# Patient Record
Sex: MALE | Race: WHITE | NOT HISPANIC OR LATINO | URBAN - METROPOLITAN AREA
[De-identification: names, ages, dates, MRNs, and addresses within clinical notes are randomized per-mention and may not be internally consistent; named-entity substitution may affect disease eponyms.]

---

## 2017-05-19 ENCOUNTER — INPATIENT (INPATIENT)
Facility: HOSPITAL | Age: 47
LOS: 27 days | Discharge: HOME HEALTH | End: 2017-06-16
Attending: COLON & RECTAL SURGERY | Admitting: COLON & RECTAL SURGERY
Payer: COMMERCIAL

## 2017-05-19 VITALS — WEIGHT: 212.08 LBS

## 2017-05-19 DIAGNOSIS — K56.69 OTHER INTESTINAL OBSTRUCTION: ICD-10-CM

## 2017-05-19 DIAGNOSIS — K57.80 DIVERTICULITIS OF INTESTINE, PART UNSPECIFIED, WITH PERFORATION AND ABSCESS WITHOUT BLEEDING: ICD-10-CM

## 2017-05-19 LAB
ALBUMIN SERPL ELPH-MCNC: 3.5 G/DL — SIGNIFICANT CHANGE UP (ref 3.3–5)
ALP SERPL-CCNC: 67 U/L — SIGNIFICANT CHANGE UP (ref 40–120)
ALT FLD-CCNC: 38 U/L — SIGNIFICANT CHANGE UP (ref 12–78)
ANION GAP SERPL CALC-SCNC: 11 MMOL/L — SIGNIFICANT CHANGE UP (ref 5–17)
APPEARANCE UR: CLEAR — SIGNIFICANT CHANGE UP
APTT BLD: 27.7 SEC — SIGNIFICANT CHANGE UP (ref 27.5–37.4)
AST SERPL-CCNC: 19 U/L — SIGNIFICANT CHANGE UP (ref 15–37)
BACTERIA # UR AUTO: (no result)
BASOPHILS # BLD AUTO: 0.1 K/UL — SIGNIFICANT CHANGE UP (ref 0–0.2)
BILIRUB SERPL-MCNC: 1.5 MG/DL — HIGH (ref 0.2–1.2)
BILIRUB UR-MCNC: NEGATIVE — SIGNIFICANT CHANGE UP
BUN SERPL-MCNC: 14 MG/DL — SIGNIFICANT CHANGE UP (ref 7–23)
CALCIUM SERPL-MCNC: 8.7 MG/DL — SIGNIFICANT CHANGE UP (ref 8.5–10.1)
CHLORIDE SERPL-SCNC: 102 MMOL/L — SIGNIFICANT CHANGE UP (ref 96–108)
CO2 SERPL-SCNC: 25 MMOL/L — SIGNIFICANT CHANGE UP (ref 22–31)
COLOR SPEC: YELLOW — SIGNIFICANT CHANGE UP
CREAT SERPL-MCNC: 1.28 MG/DL — SIGNIFICANT CHANGE UP (ref 0.5–1.3)
DIFF PNL FLD: (no result)
EOSINOPHIL # BLD AUTO: 0 K/UL — SIGNIFICANT CHANGE UP (ref 0–0.5)
EPI CELLS # UR: SIGNIFICANT CHANGE UP
GLUCOSE SERPL-MCNC: 90 MG/DL — SIGNIFICANT CHANGE UP (ref 70–99)
GLUCOSE UR QL: NEGATIVE MG/DL — SIGNIFICANT CHANGE UP
HCT VFR BLD CALC: 48 % — SIGNIFICANT CHANGE UP (ref 39–50)
HGB BLD-MCNC: 16.6 G/DL — SIGNIFICANT CHANGE UP (ref 13–17)
INR BLD: 1.06 RATIO — SIGNIFICANT CHANGE UP (ref 0.88–1.16)
KETONES UR-MCNC: (no result)
LEUKOCYTE ESTERASE UR-ACNC: (no result)
LIDOCAIN IGE QN: 102 U/L — SIGNIFICANT CHANGE UP (ref 73–393)
LYMPHOCYTES # BLD AUTO: 1.9 K/UL — SIGNIFICANT CHANGE UP (ref 1–3.3)
LYMPHOCYTES # BLD AUTO: 13 % — SIGNIFICANT CHANGE UP (ref 13–44)
MANUAL DIF COMMENT BLD-IMP: SIGNIFICANT CHANGE UP
MCHC RBC-ENTMCNC: 29.8 PG — SIGNIFICANT CHANGE UP (ref 27–34)
MCHC RBC-ENTMCNC: 34.5 GM/DL — SIGNIFICANT CHANGE UP (ref 32–36)
MCV RBC AUTO: 86.2 FL — SIGNIFICANT CHANGE UP (ref 80–100)
MONOCYTES # BLD AUTO: 1.7 K/UL — HIGH (ref 0–0.9)
MONOCYTES NFR BLD AUTO: 7 % — SIGNIFICANT CHANGE UP (ref 2–14)
NEUTROPHILS # BLD AUTO: 14.6 K/UL — HIGH (ref 1.8–7.4)
NEUTROPHILS NFR BLD AUTO: 79 % — HIGH (ref 43–77)
NEUTS BAND # BLD: 1 % — SIGNIFICANT CHANGE UP (ref 0–8)
NITRITE UR-MCNC: NEGATIVE — SIGNIFICANT CHANGE UP
PH UR: 6.5 — SIGNIFICANT CHANGE UP (ref 5–8)
PLAT MORPH BLD: NORMAL — SIGNIFICANT CHANGE UP
PLATELET # BLD AUTO: 192 K/UL — SIGNIFICANT CHANGE UP (ref 150–400)
POTASSIUM SERPL-MCNC: 4 MMOL/L — SIGNIFICANT CHANGE UP (ref 3.5–5.3)
POTASSIUM SERPL-SCNC: 4 MMOL/L — SIGNIFICANT CHANGE UP (ref 3.5–5.3)
PROT SERPL-MCNC: 7.2 GM/DL — SIGNIFICANT CHANGE UP (ref 6–8.3)
PROT UR-MCNC: 30 MG/DL
PROTHROM AB SERPL-ACNC: 11.5 SEC — SIGNIFICANT CHANGE UP (ref 9.8–12.7)
RBC # BLD: 5.56 M/UL — SIGNIFICANT CHANGE UP (ref 4.2–5.8)
RBC # FLD: 11.3 % — SIGNIFICANT CHANGE UP (ref 10.3–14.5)
RBC BLD AUTO: NORMAL — SIGNIFICANT CHANGE UP
RBC CASTS # UR COMP ASSIST: (no result) /HPF (ref 0–4)
SODIUM SERPL-SCNC: 138 MMOL/L — SIGNIFICANT CHANGE UP (ref 135–145)
SP GR SPEC: 1.01 — SIGNIFICANT CHANGE UP (ref 1.01–1.02)
UROBILINOGEN FLD QL: 1 MG/DL
WBC # BLD: 18.3 K/UL — HIGH (ref 3.8–10.5)
WBC # FLD AUTO: 18.3 K/UL — HIGH (ref 3.8–10.5)
WBC UR QL: (no result)

## 2017-05-19 PROCEDURE — 93010 ELECTROCARDIOGRAM REPORT: CPT

## 2017-05-19 PROCEDURE — 99222 1ST HOSP IP/OBS MODERATE 55: CPT

## 2017-05-19 PROCEDURE — 99285 EMERGENCY DEPT VISIT HI MDM: CPT

## 2017-05-19 PROCEDURE — 74177 CT ABD & PELVIS W/CONTRAST: CPT | Mod: 26

## 2017-05-19 RX ORDER — PIPERACILLIN AND TAZOBACTAM 4; .5 G/20ML; G/20ML
3.38 INJECTION, POWDER, LYOPHILIZED, FOR SOLUTION INTRAVENOUS EVERY 8 HOURS
Qty: 0 | Refills: 0 | Status: DISCONTINUED | OUTPATIENT
Start: 2017-05-19 | End: 2017-05-21

## 2017-05-19 RX ORDER — SODIUM CHLORIDE 9 MG/ML
500 INJECTION INTRAMUSCULAR; INTRAVENOUS; SUBCUTANEOUS ONCE
Qty: 0 | Refills: 0 | Status: COMPLETED | OUTPATIENT
Start: 2017-05-19 | End: 2017-05-20

## 2017-05-19 RX ORDER — CIPROFLOXACIN LACTATE 400MG/40ML
400 VIAL (ML) INTRAVENOUS ONCE
Qty: 0 | Refills: 0 | Status: COMPLETED | OUTPATIENT
Start: 2017-05-19 | End: 2017-05-19

## 2017-05-19 RX ORDER — SODIUM CHLORIDE 9 MG/ML
1000 INJECTION INTRAMUSCULAR; INTRAVENOUS; SUBCUTANEOUS
Qty: 0 | Refills: 0 | Status: DISCONTINUED | OUTPATIENT
Start: 2017-05-19 | End: 2017-05-20

## 2017-05-19 RX ORDER — MORPHINE SULFATE 50 MG/1
4 CAPSULE, EXTENDED RELEASE ORAL ONCE
Qty: 0 | Refills: 0 | Status: DISCONTINUED | OUTPATIENT
Start: 2017-05-19 | End: 2017-05-19

## 2017-05-19 RX ORDER — ONDANSETRON 8 MG/1
4 TABLET, FILM COATED ORAL EVERY 6 HOURS
Qty: 0 | Refills: 0 | Status: DISCONTINUED | OUTPATIENT
Start: 2017-05-19 | End: 2017-05-21

## 2017-05-19 RX ORDER — MORPHINE SULFATE 50 MG/1
2 CAPSULE, EXTENDED RELEASE ORAL
Qty: 0 | Refills: 0 | Status: DISCONTINUED | OUTPATIENT
Start: 2017-05-19 | End: 2017-05-21

## 2017-05-19 RX ORDER — SODIUM CHLORIDE 9 MG/ML
2000 INJECTION INTRAMUSCULAR; INTRAVENOUS; SUBCUTANEOUS ONCE
Qty: 0 | Refills: 0 | Status: COMPLETED | OUTPATIENT
Start: 2017-05-19 | End: 2017-05-19

## 2017-05-19 RX ORDER — ONDANSETRON 8 MG/1
4 TABLET, FILM COATED ORAL ONCE
Qty: 0 | Refills: 0 | Status: COMPLETED | OUTPATIENT
Start: 2017-05-19 | End: 2017-05-19

## 2017-05-19 RX ORDER — HYDROMORPHONE HYDROCHLORIDE 2 MG/ML
1 INJECTION INTRAMUSCULAR; INTRAVENOUS; SUBCUTANEOUS ONCE
Qty: 0 | Refills: 0 | Status: DISCONTINUED | OUTPATIENT
Start: 2017-05-19 | End: 2017-05-19

## 2017-05-19 RX ORDER — METRONIDAZOLE 500 MG
500 TABLET ORAL ONCE
Qty: 0 | Refills: 0 | Status: COMPLETED | OUTPATIENT
Start: 2017-05-19 | End: 2017-05-19

## 2017-05-19 RX ORDER — ACETAMINOPHEN 500 MG
650 TABLET ORAL EVERY 6 HOURS
Qty: 0 | Refills: 0 | Status: DISCONTINUED | OUTPATIENT
Start: 2017-05-19 | End: 2017-05-21

## 2017-05-19 RX ORDER — ENOXAPARIN SODIUM 100 MG/ML
40 INJECTION SUBCUTANEOUS DAILY
Qty: 0 | Refills: 0 | Status: DISCONTINUED | OUTPATIENT
Start: 2017-05-19 | End: 2017-05-21

## 2017-05-19 RX ADMIN — MORPHINE SULFATE 4 MILLIGRAM(S): 50 CAPSULE, EXTENDED RELEASE ORAL at 16:34

## 2017-05-19 RX ADMIN — PIPERACILLIN AND TAZOBACTAM 25 GRAM(S): 4; .5 INJECTION, POWDER, LYOPHILIZED, FOR SOLUTION INTRAVENOUS at 22:17

## 2017-05-19 RX ADMIN — Medication 100 MILLIGRAM(S): at 17:41

## 2017-05-19 RX ADMIN — SODIUM CHLORIDE 2000 MILLILITER(S): 9 INJECTION INTRAMUSCULAR; INTRAVENOUS; SUBCUTANEOUS at 14:15

## 2017-05-19 RX ADMIN — Medication 100 MILLIGRAM(S): at 18:13

## 2017-05-19 RX ADMIN — ONDANSETRON 4 MILLIGRAM(S): 8 TABLET, FILM COATED ORAL at 14:18

## 2017-05-19 RX ADMIN — HYDROMORPHONE HYDROCHLORIDE 1 MILLIGRAM(S): 2 INJECTION INTRAMUSCULAR; INTRAVENOUS; SUBCUTANEOUS at 14:18

## 2017-05-19 RX ADMIN — ENOXAPARIN SODIUM 40 MILLIGRAM(S): 100 INJECTION SUBCUTANEOUS at 22:19

## 2017-05-19 RX ADMIN — MORPHINE SULFATE 4 MILLIGRAM(S): 50 CAPSULE, EXTENDED RELEASE ORAL at 17:04

## 2017-05-19 RX ADMIN — SODIUM CHLORIDE 125 MILLILITER(S): 9 INJECTION INTRAMUSCULAR; INTRAVENOUS; SUBCUTANEOUS at 20:42

## 2017-05-19 NOTE — ED STATDOCS - OBJECTIVE STATEMENT
48 y/o male with PMHx of fatty liver presents to the ED c/o abd pain with an onset 1 day ago. Radiates to back. Pt has difficulty ambulating. +N/V/D, decreased PO intake  Denies fever. Last BM 2 days ago. Recent travel to [place]. Current smoker. Drinks socially. No drug use.

## 2017-05-19 NOTE — ED STATDOCS - CONDUCTED A DETAILED DISCUSSION WITH PATIENT AND/OR GUARDIAN REGARDING, MDM
radiology results/lab results/need to admit/return to ED if symptoms worsen, persist or questions arise

## 2017-05-19 NOTE — H&P ADULT - NSHPLABSRESULTS_GEN_ALL_CORE
16.6   18.3  )-----------( 192      ( 19 May 2017 14:09 )             48.0     05-19    138  |  102  |  14  ----------------------------<  90  4.0   |  25  |  1.28    Ca    8.7      19 May 2017 14:09    TPro  7.2  /  Alb  3.5  /  TBili  1.5<H>  /  DBili  x   /  AST  19  /  ALT  38  /  AlkPhos  67  05-19          EXAM:  CT ABDOMEN AND PELVIS IC                            PROCEDURE DATE:  05/19/2017        INTERPRETATION:  CLINICAL INFORMATION: 47-year-old man with abdominal   pain assess for appendicitis     TECHNIQUE:    CT of abdomen and pelvis was performed with axial images   were obtained from the diaphragm to pubic symphysis oral and IV contrast.    COMPARISON:  None.    FINDINGS:    Liver: Borderline hepatomegaly with mild fatty infiltration otherwise   within normal limits  Gallbladder: Within normal limits  Bile ducts: No intrahepatic or extrahepatic biliary dilatation  Pancreas: within normal limits    Spleen: within normal limits  Adrenal: within normal limits  Kidneys, Ureters and Bladder: Symmetric enhancement bilaterally. No   perinephric attending or collections. No hydronephrosis. No intrarenal   calculi. Normal caliber of the ureters. Limited unopacified nondistended   bladder.    Pelvis: No pelvic adenopathy or pelvic free fluid.  Small fat-containing   bilateral inguinal hernias.      Bowel: No bowel obstruction.  There are few scattered colonic   diverticula. There is focal thickening of sigmoid colon in the pelvis   associated with mesenteric fat stranding and thickening of adjacent   fascial planes with localized perforation and small air bubbles without   abscess. Findings are consistent with acute rupture diverticulitis. Small   free fluid adjacent to gas filled appendix.    Peritoneum: Small ascites, no organized fluid collections.    Retroperitoneum: within normal limits  Vessels: Patent.    Abdominal wall: Small fat-containing umbilical hernia.    Lower chest and lung Bases: The visualized lung bases clear except for   subsegmental dependent atelectasis. Heart normal in size.   Bones: Degenerative changes.     IMPRESSION:     Focal thickening of sigmoid colon with mesenteric fat stranding and   thickening of fascial planes with scattered adjacent air bubbles   consistent with acute diverticulitis without abscess. Small free fluid in   the abdomen extending to the right quadrant.              JAE MILLER   This document has been electronically signed. May 19 2017  4:48PM

## 2017-05-19 NOTE — ED STATDOCS - PROGRESS NOTE DETAILS
KAROLINE Galarza:  Pt still c/o RLQ, CT pending will order morphine and follow. KAROLINE Galarza:  Dr. hector gutiérrez for acute perforated diverticulitis. awaiting callback. KAROLINE Galarza:  s/w Dr. Boss will come and evaluate the pt.

## 2017-05-19 NOTE — ED ADULT NURSE NOTE - OBJECTIVE STATEMENT
Pt reports recent travel to [place] (home yesterday), s/s starting x2 days ago, TTP to periumbilical area with radiation to back, denies  symptoms, reports abd distension, C/D and decreased appetite.

## 2017-05-19 NOTE — ED STATDOCS - CARE PLAN
Principal Discharge DX:	Diverticulitis of intestine with perforation without bleeding, unspecified part of intestinal tract

## 2017-05-19 NOTE — ED STATDOCS - MEDICAL DECISION MAKING DETAILS
46 y/o male c/o abd pain, nausea and diarrhea and had recent travel to [facility] [place]. Will obtain labs, CT and meds.

## 2017-05-19 NOTE — ED STATDOCS - NS ED MD SCRIBE ATTENDING SCRIBE SECTIONS
REVIEW OF SYSTEMS/VITAL SIGNS( Pullset)/PHYSICAL EXAM/PAST MEDICAL/SURGICAL/SOCIAL HISTORY/RESULTS/HISTORY OF PRESENT ILLNESS/PROGRESS NOTE/DISPOSITION

## 2017-05-19 NOTE — ED STATDOCS - DETAILS:
I, Gustabo Jamil, performed the initial face to face bedside interview with this patient regarding history of present illness, review of symptoms and relevant past medical, social and family history.  I completed an independent physical examination.  I was the initial provider who evaluated this patient. I have signed out the follow up of any pending tests (i.e. labs, radiological studies) to the ACP.  I have communicated the patient’s plan of care and disposition with the ACP.  The history, relevant review of systems, past medical and surgical history, medical decision making, and physical examination was documented by the scribe in my presence and I attest to the accuracy of the documentation.

## 2017-05-19 NOTE — H&P ADULT - HISTORY OF PRESENT ILLNESS
47M admitted for a 24hr h/o progressive abdominal pain that began after a 1-2 day trip to [facility] ([place] The pain is localized to the suprapubic location, associated with bladder pressure and decreased po intake. No nausea, fevers/chills. Cont passing flatus and reports stools are somewhat diarrheal. WBC 18, CT with acute sigmoid diverticulitis, no drainable abscess or fluid collection, though small amounts of free fluid adjacent to appendix.

## 2017-05-19 NOTE — H&P ADULT - PROBLEM SELECTOR PLAN 1
Admit, NPO, IVF, broad spectrum abx, serial abdominal exams  Counselled patient that any worsening in vitals, WBC, abdominal exam may necessitate operative intervention

## 2017-05-20 LAB
ANION GAP SERPL CALC-SCNC: 11 MMOL/L — SIGNIFICANT CHANGE UP (ref 5–17)
BUN SERPL-MCNC: 13 MG/DL — SIGNIFICANT CHANGE UP (ref 7–23)
CALCIUM SERPL-MCNC: 7.6 MG/DL — LOW (ref 8.5–10.1)
CHLORIDE SERPL-SCNC: 108 MMOL/L — SIGNIFICANT CHANGE UP (ref 96–108)
CO2 SERPL-SCNC: 23 MMOL/L — SIGNIFICANT CHANGE UP (ref 22–31)
CREAT SERPL-MCNC: 1.09 MG/DL — SIGNIFICANT CHANGE UP (ref 0.5–1.3)
CULTURE RESULTS: NO GROWTH — SIGNIFICANT CHANGE UP
GLUCOSE SERPL-MCNC: 92 MG/DL — SIGNIFICANT CHANGE UP (ref 70–99)
HCT VFR BLD CALC: 42.1 % — SIGNIFICANT CHANGE UP (ref 39–50)
HCT VFR BLD CALC: 43 % — SIGNIFICANT CHANGE UP (ref 39–50)
HGB BLD-MCNC: 14 G/DL — SIGNIFICANT CHANGE UP (ref 13–17)
HGB BLD-MCNC: 14.3 G/DL — SIGNIFICANT CHANGE UP (ref 13–17)
MCHC RBC-ENTMCNC: 29.9 PG — SIGNIFICANT CHANGE UP (ref 27–34)
MCHC RBC-ENTMCNC: 30 PG — SIGNIFICANT CHANGE UP (ref 27–34)
MCHC RBC-ENTMCNC: 33.2 GM/DL — SIGNIFICANT CHANGE UP (ref 32–36)
MCHC RBC-ENTMCNC: 33.2 GM/DL — SIGNIFICANT CHANGE UP (ref 32–36)
MCV RBC AUTO: 90 FL — SIGNIFICANT CHANGE UP (ref 80–100)
MCV RBC AUTO: 90.5 FL — SIGNIFICANT CHANGE UP (ref 80–100)
PLATELET # BLD AUTO: 138 K/UL — LOW (ref 150–400)
PLATELET # BLD AUTO: 154 K/UL — SIGNIFICANT CHANGE UP (ref 150–400)
POTASSIUM SERPL-MCNC: 3.9 MMOL/L — SIGNIFICANT CHANGE UP (ref 3.5–5.3)
POTASSIUM SERPL-SCNC: 3.9 MMOL/L — SIGNIFICANT CHANGE UP (ref 3.5–5.3)
RBC # BLD: 4.67 M/UL — SIGNIFICANT CHANGE UP (ref 4.2–5.8)
RBC # BLD: 4.75 M/UL — SIGNIFICANT CHANGE UP (ref 4.2–5.8)
RBC # FLD: 11.9 % — SIGNIFICANT CHANGE UP (ref 10.3–14.5)
RBC # FLD: 11.9 % — SIGNIFICANT CHANGE UP (ref 10.3–14.5)
SODIUM SERPL-SCNC: 142 MMOL/L — SIGNIFICANT CHANGE UP (ref 135–145)
SPECIMEN SOURCE: SIGNIFICANT CHANGE UP
WBC # BLD: 19.2 K/UL — HIGH (ref 3.8–10.5)
WBC # BLD: 19.5 K/UL — HIGH (ref 3.8–10.5)
WBC # FLD AUTO: 19.2 K/UL — HIGH (ref 3.8–10.5)
WBC # FLD AUTO: 19.5 K/UL — HIGH (ref 3.8–10.5)

## 2017-05-20 PROCEDURE — 99233 SBSQ HOSP IP/OBS HIGH 50: CPT | Mod: 57

## 2017-05-20 RX ORDER — SODIUM CHLORIDE 9 MG/ML
1000 INJECTION INTRAMUSCULAR; INTRAVENOUS; SUBCUTANEOUS
Qty: 0 | Refills: 0 | Status: DISCONTINUED | OUTPATIENT
Start: 2017-05-20 | End: 2017-05-21

## 2017-05-20 RX ORDER — METRONIDAZOLE 500 MG
TABLET ORAL
Qty: 0 | Refills: 0 | Status: DISCONTINUED | OUTPATIENT
Start: 2017-05-20 | End: 2017-05-21

## 2017-05-20 RX ORDER — METRONIDAZOLE 500 MG
500 TABLET ORAL ONCE
Qty: 0 | Refills: 0 | Status: COMPLETED | OUTPATIENT
Start: 2017-05-20 | End: 2017-05-20

## 2017-05-20 RX ORDER — METRONIDAZOLE 500 MG
1 TABLET ORAL
Qty: 18 | Refills: 0
Start: 2017-05-20 | End: 2017-05-26

## 2017-05-20 RX ORDER — SODIUM CHLORIDE 9 MG/ML
500 INJECTION INTRAMUSCULAR; INTRAVENOUS; SUBCUTANEOUS ONCE
Qty: 0 | Refills: 0 | Status: COMPLETED | OUTPATIENT
Start: 2017-05-20 | End: 2017-05-20

## 2017-05-20 RX ORDER — CIPROFLOXACIN LACTATE 400MG/40ML
1 VIAL (ML) INTRAVENOUS
Qty: 12 | Refills: 0
Start: 2017-05-20 | End: 2017-05-26

## 2017-05-20 RX ORDER — IBUPROFEN 200 MG
400 TABLET ORAL ONCE
Qty: 0 | Refills: 0 | Status: COMPLETED | OUTPATIENT
Start: 2017-05-20 | End: 2017-05-20

## 2017-05-20 RX ORDER — METRONIDAZOLE 500 MG
500 TABLET ORAL EVERY 8 HOURS
Qty: 0 | Refills: 0 | Status: DISCONTINUED | OUTPATIENT
Start: 2017-05-20 | End: 2017-05-21

## 2017-05-20 RX ADMIN — HYDROMORPHONE HYDROCHLORIDE 1 MILLIGRAM(S): 2 INJECTION INTRAMUSCULAR; INTRAVENOUS; SUBCUTANEOUS at 00:25

## 2017-05-20 RX ADMIN — MORPHINE SULFATE 2 MILLIGRAM(S): 50 CAPSULE, EXTENDED RELEASE ORAL at 17:53

## 2017-05-20 RX ADMIN — MORPHINE SULFATE 2 MILLIGRAM(S): 50 CAPSULE, EXTENDED RELEASE ORAL at 21:14

## 2017-05-20 RX ADMIN — SODIUM CHLORIDE 1000 MILLILITER(S): 9 INJECTION INTRAMUSCULAR; INTRAVENOUS; SUBCUTANEOUS at 03:21

## 2017-05-20 RX ADMIN — MORPHINE SULFATE 2 MILLIGRAM(S): 50 CAPSULE, EXTENDED RELEASE ORAL at 17:30

## 2017-05-20 RX ADMIN — Medication 650 MILLIGRAM(S): at 12:52

## 2017-05-20 RX ADMIN — ONDANSETRON 4 MILLIGRAM(S): 8 TABLET, FILM COATED ORAL at 17:53

## 2017-05-20 RX ADMIN — PIPERACILLIN AND TAZOBACTAM 25 GRAM(S): 4; .5 INJECTION, POWDER, LYOPHILIZED, FOR SOLUTION INTRAVENOUS at 14:16

## 2017-05-20 RX ADMIN — Medication 400 MILLIGRAM(S): at 03:21

## 2017-05-20 RX ADMIN — SODIUM CHLORIDE 125 MILLILITER(S): 9 INJECTION INTRAMUSCULAR; INTRAVENOUS; SUBCUTANEOUS at 03:21

## 2017-05-20 RX ADMIN — SODIUM CHLORIDE 1000 MILLILITER(S): 9 INJECTION INTRAMUSCULAR; INTRAVENOUS; SUBCUTANEOUS at 00:25

## 2017-05-20 RX ADMIN — Medication 100 MILLIGRAM(S): at 12:57

## 2017-05-20 RX ADMIN — Medication 100 MILLIGRAM(S): at 21:11

## 2017-05-20 RX ADMIN — SODIUM CHLORIDE 1000 MILLILITER(S): 9 INJECTION INTRAMUSCULAR; INTRAVENOUS; SUBCUTANEOUS at 02:18

## 2017-05-20 RX ADMIN — PIPERACILLIN AND TAZOBACTAM 25 GRAM(S): 4; .5 INJECTION, POWDER, LYOPHILIZED, FOR SOLUTION INTRAVENOUS at 22:39

## 2017-05-20 RX ADMIN — Medication 400 MILLIGRAM(S): at 03:22

## 2017-05-20 RX ADMIN — MORPHINE SULFATE 2 MILLIGRAM(S): 50 CAPSULE, EXTENDED RELEASE ORAL at 22:39

## 2017-05-20 RX ADMIN — PIPERACILLIN AND TAZOBACTAM 25 GRAM(S): 4; .5 INJECTION, POWDER, LYOPHILIZED, FOR SOLUTION INTRAVENOUS at 05:08

## 2017-05-20 RX ADMIN — SODIUM CHLORIDE 500 MILLILITER(S): 9 INJECTION INTRAMUSCULAR; INTRAVENOUS; SUBCUTANEOUS at 06:08

## 2017-05-20 NOTE — PROGRESS NOTE ADULT - SUBJECTIVE AND OBJECTIVE BOX
No c/o. Jil low fiber diet without N/V. Passing flatus and stools. No issues with pain.    MEDICATIONS  (STANDING):  piperacillin/tazobactam IVPB. 3.375Gram(s) IV Intermittent every 8 hours  sodium chloride 0.9%. 1000milliLiter(s) IV Continuous <Continuous>  enoxaparin Injectable 40milliGRAM(s) SubCutaneous daily    MEDICATIONS  (PRN):  acetaminophen   Tablet 650milliGRAM(s) Oral every 6 hours PRN For Temp greater than 38.5 C (101.3 F)  morphine  - Injectable 2milliGRAM(s) IV Push every 2 hours PRN Moderate Pain (4 - 6)  morphine  - Injectable 2milliGRAM(s) IV Push every 1 hour PRN Severe Pain (7 - 10)  ondansetron Injectable 4milliGRAM(s) IV Push every 6 hours PRN Nausea and/or Vomiting    Vital Signs Last 24 Hrs  T(C): 36.6, Max: 38.6 (05-20 @ 03:09)  T(F): 97.9, Max: 101.5 (05-20 @ 03:09)  HR: 69 (69 - 92)  BP: 91/55 (82/40 - 129/65)  BP(mean): --  RR: 20 (16 - 20)  SpO2: 96% (95% - 100%)  I&O's Detail    I & Os for current day (as of 20 May 2017 09:36)  =============================================  IN:    IV PiggyBack: 2600 ml    sodium chloride 0.9%.: 500 ml    Total IN: 3100 ml  ---------------------------------------------  OUT:    Voided: 1200 ml    Total OUT: 1200 ml  ---------------------------------------------  Total NET: 1900 ml    NAD  incision CDI, soft, NT, mild distention                        14.3   19.2  )-----------( 138      ( 20 May 2017 08:12 )             43.0     05-20    142  |  108  |  13  ----------------------------<  92  3.9   |  23  |  1.09    Ca    7.6<L>      20 May 2017 08:12    TPro  7.2  /  Alb  3.5  /  TBili  1.5<H>  /  DBili  x   /  AST  19  /  ALT  38  /  AlkPhos  67  05-19    POD 4 LAR for colon perf s/p colonoscopy    Provena discontinued. LUIS M discontinued.  OK to d/c home with additional 6 days of cipro/flagyl Febrile overnight to 101.5. Required multiple fluid boluses for SBPs in the mid 80s without associated tachychardia. Pt reports pain and pressure slightly improved. Has begun passing flatus though not stools.     MEDICATIONS  (STANDING):  piperacillin/tazobactam IVPB. 3.375Gram(s) IV Intermittent every 8 hours  sodium chloride 0.9%. 1000milliLiter(s) IV Continuous <Continuous>  enoxaparin Injectable 40milliGRAM(s) SubCutaneous daily    MEDICATIONS  (PRN):  acetaminophen   Tablet 650milliGRAM(s) Oral every 6 hours PRN For Temp greater than 38.5 C (101.3 F)  morphine  - Injectable 2milliGRAM(s) IV Push every 2 hours PRN Moderate Pain (4 - 6)  morphine  - Injectable 2milliGRAM(s) IV Push every 1 hour PRN Severe Pain (7 - 10)  ondansetron Injectable 4milliGRAM(s) IV Push every 6 hours PRN Nausea and/or Vomiting    Vital Signs Last 24 Hrs  T(C): 36.6, Max: 38.6 (05-20 @ 03:09)  T(F): 97.9, Max: 101.5 (05-20 @ 03:09)  HR: 69 (69 - 92)  BP: 91/55 (82/40 - 129/65)  BP(mean): --  RR: 20 (16 - 20)  SpO2: 96% (95% - 100%)  I&O's Detail    I & Os for current day (as of 20 May 2017 09:36)  =============================================  IN:    IV PiggyBack: 2600 ml    sodium chloride 0.9%.: 500 ml    Total IN: 3100 ml  ---------------------------------------------  OUT:    Voided: 1200 ml    Total OUT: 1200 ml  ---------------------------------------------    Total NET: 1900 ml    NAD  soft, localized tenderness predominantly in the suprapubic location, mild distention                        14.3   19.2  )-----------( 138      ( 20 May 2017 08:12 )             43.0     05-20    142  |  108  |  13  ----------------------------<  92  3.9   |  23  |  1.09    Ca    7.6<L>      20 May 2017 08:12    TPro  7.2  /  Alb  3.5  /  TBili  1.5<H>  /  DBili  x   /  AST  19  /  ALT  38  /  AlkPhos  67  05-19    A/P - Acute sigmoid diverticulitis  Cont morphine prn for pain.  Fluid boluses have been administered to maintain blood pressure.  Cont NPO.   WBC up slightly to 19. On Zosyn. Will add flagyl.  Increase IVF to 150cc.  Advised patient that despite the febrile episode and slight rise in WBC, we will administer at least 24 hrs of abx and monitor the response before the need for surgical intervention becomes more apparent, nikhil as pt feels slightly improved from admission.  He understands and is agreeable.

## 2017-05-21 ENCOUNTER — RESULT REVIEW (OUTPATIENT)
Age: 47
End: 2017-05-21

## 2017-05-21 LAB
ANION GAP SERPL CALC-SCNC: 11 MMOL/L — SIGNIFICANT CHANGE UP (ref 5–17)
ANION GAP SERPL CALC-SCNC: 16 MMOL/L — SIGNIFICANT CHANGE UP (ref 5–17)
BUN SERPL-MCNC: 25 MG/DL — HIGH (ref 7–23)
BUN SERPL-MCNC: 26 MG/DL — HIGH (ref 7–23)
CALCIUM SERPL-MCNC: 7.8 MG/DL — LOW (ref 8.5–10.1)
CALCIUM SERPL-MCNC: 8 MG/DL — LOW (ref 8.5–10.1)
CHLORIDE SERPL-SCNC: 106 MMOL/L — SIGNIFICANT CHANGE UP (ref 96–108)
CHLORIDE SERPL-SCNC: 107 MMOL/L — SIGNIFICANT CHANGE UP (ref 96–108)
CO2 SERPL-SCNC: 17 MMOL/L — LOW (ref 22–31)
CO2 SERPL-SCNC: 20 MMOL/L — LOW (ref 22–31)
CREAT SERPL-MCNC: 1.69 MG/DL — HIGH (ref 0.5–1.3)
CREAT SERPL-MCNC: 1.76 MG/DL — HIGH (ref 0.5–1.3)
GLUCOSE SERPL-MCNC: 196 MG/DL — HIGH (ref 70–99)
GLUCOSE SERPL-MCNC: 210 MG/DL — HIGH (ref 70–99)
HCT VFR BLD CALC: 57.1 % — CRITICAL HIGH (ref 39–50)
HCT VFR BLD CALC: 58 % — CRITICAL HIGH (ref 39–50)
HGB BLD-MCNC: 18.9 G/DL — HIGH (ref 13–17)
HGB BLD-MCNC: 19.6 G/DL — CRITICAL HIGH (ref 13–17)
MCHC RBC-ENTMCNC: 29.5 PG — SIGNIFICANT CHANGE UP (ref 27–34)
MCHC RBC-ENTMCNC: 30 PG — SIGNIFICANT CHANGE UP (ref 27–34)
MCHC RBC-ENTMCNC: 33 GM/DL — SIGNIFICANT CHANGE UP (ref 32–36)
MCHC RBC-ENTMCNC: 33.8 GM/DL — SIGNIFICANT CHANGE UP (ref 32–36)
MCV RBC AUTO: 88.8 FL — SIGNIFICANT CHANGE UP (ref 80–100)
MCV RBC AUTO: 89.3 FL — SIGNIFICANT CHANGE UP (ref 80–100)
PLATELET # BLD AUTO: 226 K/UL — SIGNIFICANT CHANGE UP (ref 150–400)
PLATELET # BLD AUTO: 232 K/UL — SIGNIFICANT CHANGE UP (ref 150–400)
POTASSIUM SERPL-MCNC: 3.8 MMOL/L — SIGNIFICANT CHANGE UP (ref 3.5–5.3)
POTASSIUM SERPL-MCNC: 4 MMOL/L — SIGNIFICANT CHANGE UP (ref 3.5–5.3)
POTASSIUM SERPL-SCNC: 3.8 MMOL/L — SIGNIFICANT CHANGE UP (ref 3.5–5.3)
POTASSIUM SERPL-SCNC: 4 MMOL/L — SIGNIFICANT CHANGE UP (ref 3.5–5.3)
RBC # BLD: 6.39 M/UL — HIGH (ref 4.2–5.8)
RBC # BLD: 6.53 M/UL — HIGH (ref 4.2–5.8)
RBC # FLD: 11.3 % — SIGNIFICANT CHANGE UP (ref 10.3–14.5)
RBC # FLD: 11.6 % — SIGNIFICANT CHANGE UP (ref 10.3–14.5)
SODIUM SERPL-SCNC: 138 MMOL/L — SIGNIFICANT CHANGE UP (ref 135–145)
SODIUM SERPL-SCNC: 139 MMOL/L — SIGNIFICANT CHANGE UP (ref 135–145)
WBC # BLD: 31.8 K/UL — HIGH (ref 3.8–10.5)
WBC # BLD: 33.8 K/UL — HIGH (ref 3.8–10.5)
WBC # FLD AUTO: 31.8 K/UL — HIGH (ref 3.8–10.5)
WBC # FLD AUTO: 33.8 K/UL — HIGH (ref 3.8–10.5)

## 2017-05-21 PROCEDURE — 88307 TISSUE EXAM BY PATHOLOGIST: CPT | Mod: 26

## 2017-05-21 PROCEDURE — 88305 TISSUE EXAM BY PATHOLOGIST: CPT | Mod: 26

## 2017-05-21 PROCEDURE — 44202 LAP ENTERECTOMY: CPT

## 2017-05-21 PROCEDURE — 44206 LAP PART COLECTOMY W/STOMA: CPT

## 2017-05-21 PROCEDURE — 93010 ELECTROCARDIOGRAM REPORT: CPT

## 2017-05-21 RX ORDER — HEPARIN SODIUM 5000 [USP'U]/ML
5000 INJECTION INTRAVENOUS; SUBCUTANEOUS EVERY 8 HOURS
Qty: 0 | Refills: 0 | Status: DISCONTINUED | OUTPATIENT
Start: 2017-05-21 | End: 2017-06-16

## 2017-05-21 RX ORDER — SODIUM CHLORIDE 9 MG/ML
1000 INJECTION INTRAMUSCULAR; INTRAVENOUS; SUBCUTANEOUS
Qty: 0 | Refills: 0 | Status: DISCONTINUED | OUTPATIENT
Start: 2017-05-22 | End: 2017-05-23

## 2017-05-21 RX ORDER — DEXTROSE 50 % IN WATER 50 %
25 SYRINGE (ML) INTRAVENOUS ONCE
Qty: 0 | Refills: 0 | Status: DISCONTINUED | OUTPATIENT
Start: 2017-05-22 | End: 2017-05-24

## 2017-05-21 RX ORDER — SODIUM CHLORIDE 9 MG/ML
1000 INJECTION INTRAMUSCULAR; INTRAVENOUS; SUBCUTANEOUS ONCE
Qty: 0 | Refills: 0 | Status: COMPLETED | OUTPATIENT
Start: 2017-05-21 | End: 2017-05-21

## 2017-05-21 RX ORDER — SODIUM CHLORIDE 9 MG/ML
1000 INJECTION INTRAMUSCULAR; INTRAVENOUS; SUBCUTANEOUS
Qty: 0 | Refills: 0 | Status: DISCONTINUED | OUTPATIENT
Start: 2017-05-21 | End: 2017-05-22

## 2017-05-21 RX ORDER — DEXTROSE 50 % IN WATER 50 %
1 SYRINGE (ML) INTRAVENOUS ONCE
Qty: 0 | Refills: 0 | Status: DISCONTINUED | OUTPATIENT
Start: 2017-05-22 | End: 2017-05-24

## 2017-05-21 RX ORDER — ACETAMINOPHEN 500 MG
650 TABLET ORAL EVERY 6 HOURS
Qty: 0 | Refills: 0 | Status: DISCONTINUED | OUTPATIENT
Start: 2017-05-21 | End: 2017-06-04

## 2017-05-21 RX ORDER — GLUCAGON INJECTION, SOLUTION 0.5 MG/.1ML
1 INJECTION, SOLUTION SUBCUTANEOUS ONCE
Qty: 0 | Refills: 0 | Status: DISCONTINUED | OUTPATIENT
Start: 2017-05-22 | End: 2017-05-24

## 2017-05-21 RX ORDER — NALOXONE HYDROCHLORIDE 4 MG/.1ML
0.1 SPRAY NASAL
Qty: 0 | Refills: 0 | Status: DISCONTINUED | OUTPATIENT
Start: 2017-05-21 | End: 2017-06-16

## 2017-05-21 RX ORDER — METRONIDAZOLE 500 MG
500 TABLET ORAL EVERY 8 HOURS
Qty: 0 | Refills: 0 | Status: DISCONTINUED | OUTPATIENT
Start: 2017-05-21 | End: 2017-05-26

## 2017-05-21 RX ORDER — PIPERACILLIN AND TAZOBACTAM 4; .5 G/20ML; G/20ML
3.38 INJECTION, POWDER, LYOPHILIZED, FOR SOLUTION INTRAVENOUS EVERY 8 HOURS
Qty: 0 | Refills: 0 | Status: DISCONTINUED | OUTPATIENT
Start: 2017-05-22 | End: 2017-05-22

## 2017-05-21 RX ORDER — PANTOPRAZOLE SODIUM 20 MG/1
40 TABLET, DELAYED RELEASE ORAL DAILY
Qty: 0 | Refills: 0 | Status: DISCONTINUED | OUTPATIENT
Start: 2017-05-21 | End: 2017-06-03

## 2017-05-21 RX ORDER — DEXTROSE 50 % IN WATER 50 %
12.5 SYRINGE (ML) INTRAVENOUS ONCE
Qty: 0 | Refills: 0 | Status: DISCONTINUED | OUTPATIENT
Start: 2017-05-22 | End: 2017-05-24

## 2017-05-21 RX ORDER — INSULIN LISPRO 100/ML
VIAL (ML) SUBCUTANEOUS
Qty: 0 | Refills: 0 | Status: DISCONTINUED | OUTPATIENT
Start: 2017-05-22 | End: 2017-05-24

## 2017-05-21 RX ORDER — ONDANSETRON 8 MG/1
4 TABLET, FILM COATED ORAL EVERY 6 HOURS
Qty: 0 | Refills: 0 | Status: DISCONTINUED | OUTPATIENT
Start: 2017-05-21 | End: 2017-06-07

## 2017-05-21 RX ORDER — SODIUM CHLORIDE 9 MG/ML
1000 INJECTION INTRAMUSCULAR; INTRAVENOUS; SUBCUTANEOUS
Qty: 0 | Refills: 0 | Status: DISCONTINUED | OUTPATIENT
Start: 2017-05-21 | End: 2017-05-21

## 2017-05-21 RX ORDER — HYDROMORPHONE HYDROCHLORIDE 2 MG/ML
30 INJECTION INTRAMUSCULAR; INTRAVENOUS; SUBCUTANEOUS
Qty: 0 | Refills: 0 | Status: DISCONTINUED | OUTPATIENT
Start: 2017-05-21 | End: 2017-05-23

## 2017-05-21 RX ORDER — SODIUM CHLORIDE 9 MG/ML
1000 INJECTION, SOLUTION INTRAVENOUS
Qty: 0 | Refills: 0 | Status: DISCONTINUED | OUTPATIENT
Start: 2017-05-22 | End: 2017-05-24

## 2017-05-21 RX ORDER — ONDANSETRON 8 MG/1
4 TABLET, FILM COATED ORAL ONCE
Qty: 0 | Refills: 0 | Status: DISCONTINUED | OUTPATIENT
Start: 2017-05-21 | End: 2017-05-22

## 2017-05-21 RX ORDER — ONDANSETRON 8 MG/1
4 TABLET, FILM COATED ORAL EVERY 6 HOURS
Qty: 0 | Refills: 0 | Status: DISCONTINUED | OUTPATIENT
Start: 2017-05-22 | End: 2017-06-16

## 2017-05-21 RX ORDER — SODIUM CHLORIDE 9 MG/ML
1000 INJECTION INTRAMUSCULAR; INTRAVENOUS; SUBCUTANEOUS ONCE
Qty: 0 | Refills: 0 | Status: COMPLETED | OUTPATIENT
Start: 2017-05-21 | End: 2017-05-22

## 2017-05-21 RX ORDER — MEPERIDINE HYDROCHLORIDE 50 MG/ML
12.5 INJECTION INTRAMUSCULAR; INTRAVENOUS; SUBCUTANEOUS
Qty: 0 | Refills: 0 | Status: DISCONTINUED | OUTPATIENT
Start: 2017-05-21 | End: 2017-05-22

## 2017-05-21 RX ORDER — ACETAMINOPHEN 500 MG
1000 TABLET ORAL ONCE
Qty: 0 | Refills: 0 | Status: COMPLETED | OUTPATIENT
Start: 2017-05-21 | End: 2017-05-21

## 2017-05-21 RX ORDER — PANTOPRAZOLE SODIUM 20 MG/1
40 TABLET, DELAYED RELEASE ORAL DAILY
Qty: 0 | Refills: 0 | Status: DISCONTINUED | OUTPATIENT
Start: 2017-05-21 | End: 2017-05-21

## 2017-05-21 RX ORDER — PIPERACILLIN AND TAZOBACTAM 4; .5 G/20ML; G/20ML
3.38 INJECTION, POWDER, LYOPHILIZED, FOR SOLUTION INTRAVENOUS EVERY 8 HOURS
Qty: 0 | Refills: 0 | Status: DISCONTINUED | OUTPATIENT
Start: 2017-05-21 | End: 2017-05-22

## 2017-05-21 RX ADMIN — SODIUM CHLORIDE 1000 MILLILITER(S): 9 INJECTION INTRAMUSCULAR; INTRAVENOUS; SUBCUTANEOUS at 12:59

## 2017-05-21 RX ADMIN — PIPERACILLIN AND TAZOBACTAM 25 GRAM(S): 4; .5 INJECTION, POWDER, LYOPHILIZED, FOR SOLUTION INTRAVENOUS at 06:16

## 2017-05-21 RX ADMIN — SODIUM CHLORIDE 100 MILLILITER(S): 9 INJECTION INTRAMUSCULAR; INTRAVENOUS; SUBCUTANEOUS at 20:08

## 2017-05-21 RX ADMIN — SODIUM CHLORIDE 150 MILLILITER(S): 9 INJECTION INTRAMUSCULAR; INTRAVENOUS; SUBCUTANEOUS at 00:46

## 2017-05-21 RX ADMIN — Medication 100 MILLIGRAM(S): at 05:20

## 2017-05-21 RX ADMIN — SODIUM CHLORIDE 150 MILLILITER(S): 9 INJECTION INTRAMUSCULAR; INTRAVENOUS; SUBCUTANEOUS at 22:45

## 2017-05-21 RX ADMIN — HYDROMORPHONE HYDROCHLORIDE 30 MILLILITER(S): 2 INJECTION INTRAMUSCULAR; INTRAVENOUS; SUBCUTANEOUS at 20:06

## 2017-05-21 RX ADMIN — SODIUM CHLORIDE 2000 MILLILITER(S): 9 INJECTION INTRAMUSCULAR; INTRAVENOUS; SUBCUTANEOUS at 20:03

## 2017-05-21 RX ADMIN — ONDANSETRON 4 MILLIGRAM(S): 8 TABLET, FILM COATED ORAL at 00:48

## 2017-05-21 RX ADMIN — Medication 400 MILLIGRAM(S): at 20:30

## 2017-05-21 NOTE — PROGRESS NOTE ADULT - SUBJECTIVE AND OBJECTIVE BOX
Improved over last 24 hrs. Fevers have defervesced, blood pressures have stabilized, abdominal pain and pressure resolved. Passing flatus and BMs overnight. Minimal pain medications requested per review of MAR. However, several episodes of emesis overnight with total volume ~1300cc.     MEDICATIONS  (STANDING):  piperacillin/tazobactam IVPB. 3.375Gram(s) IV Intermittent every 8 hours  enoxaparin Injectable 40milliGRAM(s) SubCutaneous daily  metroNIDAZOLE  IVPB 500milliGRAM(s) IV Intermittent every 8 hours  metroNIDAZOLE  IVPB  IV Intermittent   sodium chloride 0.9%. 1000milliLiter(s) IV Continuous <Continuous>  pantoprazole  Injectable 40milliGRAM(s) IV Push daily    MEDICATIONS  (PRN):  acetaminophen   Tablet 650milliGRAM(s) Oral every 6 hours PRN For Temp greater than 38.5 C (101.3 F)  morphine  - Injectable 2milliGRAM(s) IV Push every 2 hours PRN Moderate Pain (4 - 6)  morphine  - Injectable 2milliGRAM(s) IV Push every 1 hour PRN Severe Pain (7 - 10)  ondansetron Injectable 4milliGRAM(s) IV Push every 6 hours PRN Nausea and/or Vomiting    Vital Signs Last 24 Hrs  T(C): 36.4, Max: 37.9 (05-20 @ 20:43)  T(F): 97.6, Max: 100.2 (05-20 @ 20:43)  HR: 84 (80 - 87)  BP: 136/84 (103/49 - 136/84)  BP(mean): --  RR: 20 (20 - 24)  SpO2: 97% (97% - 100%)  I&O's Detail    I & Os for current day (as of 21 May 2017 08:28)  =============================================  IN:    sodium chloride 0.9%.: 1000 ml    IV PiggyBack: 400 ml    Total IN: 1400 ml  ---------------------------------------------  OUT:    Emesis: 1300 ml    Total OUT: 1300 ml  ---------------------------------------------  Total NET: 100 ml    NAD  ABD: soft, less tender in suprapubic locations and lower quadrants, moderate distention, no e/o diffuse peritoneal signs             5/21 AM labs pending    A/P - Acute sigmoid diverticulitis    Overall, appears to be clinically improving as evidenced by minimal requests for Morphine on review of MAR, normalization of HD stability, return of flatus/BM, defervescing of fevers.    He did develop emesis overnight which may be continued manifestation of mild ileus from inflammatory process associated with diverticulitis. Will add protonix for reported symptoms of heartburn and change to strict NPO, as nursing reports that he is drinking more water than just sips.     Followup labwork and repeat for tomorrow. Cont Zosyn and Flagyl.    Interval worsening in clinical condition will be further evaluated with CT scan. Potential need for surgical intervention remains.     Discussed with patient and bedside RNDora.

## 2017-05-22 LAB
ANION GAP SERPL CALC-SCNC: 11 MMOL/L — SIGNIFICANT CHANGE UP (ref 5–17)
ANION GAP SERPL CALC-SCNC: 17 MMOL/L — SIGNIFICANT CHANGE UP (ref 5–17)
APPEARANCE UR: CLEAR — SIGNIFICANT CHANGE UP
BACTERIA # UR AUTO: (no result)
BILIRUB UR-MCNC: (no result)
BUN SERPL-MCNC: 33 MG/DL — HIGH (ref 7–23)
BUN SERPL-MCNC: 37 MG/DL — HIGH (ref 7–23)
CALCIUM SERPL-MCNC: 6.2 MG/DL — CRITICAL LOW (ref 8.5–10.1)
CALCIUM SERPL-MCNC: 6.2 MG/DL — CRITICAL LOW (ref 8.5–10.1)
CHLORIDE SERPL-SCNC: 113 MMOL/L — HIGH (ref 96–108)
CHLORIDE SERPL-SCNC: 116 MMOL/L — HIGH (ref 96–108)
CO2 SERPL-SCNC: 15 MMOL/L — LOW (ref 22–31)
CO2 SERPL-SCNC: 18 MMOL/L — LOW (ref 22–31)
COLOR SPEC: (no result)
CREAT SERPL-MCNC: 2.81 MG/DL — HIGH (ref 0.5–1.3)
CREAT SERPL-MCNC: 3.28 MG/DL — HIGH (ref 0.5–1.3)
DIFF PNL FLD: (no result)
GLUCOSE SERPL-MCNC: 138 MG/DL — HIGH (ref 70–99)
GLUCOSE SERPL-MCNC: 142 MG/DL — HIGH (ref 70–99)
GLUCOSE UR QL: NEGATIVE MG/DL — SIGNIFICANT CHANGE UP
GRAN CASTS # UR COMP ASSIST: (no result) /LPF
HBA1C BLD-MCNC: 5.3 % — SIGNIFICANT CHANGE UP (ref 4–5.6)
HCT VFR BLD CALC: 57.7 % — CRITICAL HIGH (ref 39–50)
HGB BLD-MCNC: 18.7 G/DL — HIGH (ref 13–17)
KETONES UR-MCNC: (no result)
LACTATE SERPL-SCNC: 4 MMOL/L — CRITICAL HIGH (ref 0.7–2)
LACTATE SERPL-SCNC: 8 MMOL/L — CRITICAL HIGH (ref 0.7–2)
LEUKOCYTE ESTERASE UR-ACNC: (no result)
MAGNESIUM SERPL-MCNC: 2 MG/DL — SIGNIFICANT CHANGE UP (ref 1.6–2.6)
MCHC RBC-ENTMCNC: 29.1 PG — SIGNIFICANT CHANGE UP (ref 27–34)
MCHC RBC-ENTMCNC: 32.4 GM/DL — SIGNIFICANT CHANGE UP (ref 32–36)
MCV RBC AUTO: 90 FL — SIGNIFICANT CHANGE UP (ref 80–100)
NITRITE UR-MCNC: POSITIVE
PH UR: 5 — SIGNIFICANT CHANGE UP (ref 5–8)
PHOSPHATE SERPL-MCNC: 3.4 MG/DL — SIGNIFICANT CHANGE UP (ref 2.5–4.5)
PLATELET # BLD AUTO: 282 K/UL — SIGNIFICANT CHANGE UP (ref 150–400)
POTASSIUM SERPL-MCNC: 4.9 MMOL/L — SIGNIFICANT CHANGE UP (ref 3.5–5.3)
POTASSIUM SERPL-MCNC: 5 MMOL/L — SIGNIFICANT CHANGE UP (ref 3.5–5.3)
POTASSIUM SERPL-SCNC: 4.9 MMOL/L — SIGNIFICANT CHANGE UP (ref 3.5–5.3)
POTASSIUM SERPL-SCNC: 5 MMOL/L — SIGNIFICANT CHANGE UP (ref 3.5–5.3)
PROT UR-MCNC: 30 MG/DL
RBC # BLD: 6.42 M/UL — HIGH (ref 4.2–5.8)
RBC # FLD: 12.4 % — SIGNIFICANT CHANGE UP (ref 10.3–14.5)
RBC CASTS # UR COMP ASSIST: (no result) /HPF (ref 0–4)
SODIUM SERPL-SCNC: 145 MMOL/L — SIGNIFICANT CHANGE UP (ref 135–145)
SODIUM SERPL-SCNC: 145 MMOL/L — SIGNIFICANT CHANGE UP (ref 135–145)
SP GR SPEC: 1.02 — SIGNIFICANT CHANGE UP (ref 1.01–1.02)
UROBILINOGEN FLD QL: 4 MG/DL
WBC # BLD: 34.6 K/UL — HIGH (ref 3.8–10.5)
WBC # FLD AUTO: 34.6 K/UL — HIGH (ref 3.8–10.5)
WBC UR QL: (no result)

## 2017-05-22 PROCEDURE — 71010: CPT | Mod: 26

## 2017-05-22 RX ORDER — PIPERACILLIN AND TAZOBACTAM 4; .5 G/20ML; G/20ML
3.38 INJECTION, POWDER, LYOPHILIZED, FOR SOLUTION INTRAVENOUS EVERY 12 HOURS
Qty: 0 | Refills: 0 | Status: DISCONTINUED | OUTPATIENT
Start: 2017-05-22 | End: 2017-05-24

## 2017-05-22 RX ORDER — SODIUM CHLORIDE 9 MG/ML
1000 INJECTION INTRAMUSCULAR; INTRAVENOUS; SUBCUTANEOUS ONCE
Qty: 0 | Refills: 0 | Status: COMPLETED | OUTPATIENT
Start: 2017-05-22 | End: 2017-05-22

## 2017-05-22 RX ORDER — SODIUM CHLORIDE 9 MG/ML
1000 INJECTION INTRAMUSCULAR; INTRAVENOUS; SUBCUTANEOUS
Qty: 0 | Refills: 0 | Status: COMPLETED | OUTPATIENT
Start: 2017-05-22 | End: 2017-05-23

## 2017-05-22 RX ORDER — HEPARIN SODIUM 5000 [USP'U]/ML
5000 INJECTION INTRAVENOUS; SUBCUTANEOUS EVERY 8 HOURS
Qty: 0 | Refills: 0 | Status: DISCONTINUED | OUTPATIENT
Start: 2017-05-22 | End: 2017-05-22

## 2017-05-22 RX ORDER — IPRATROPIUM/ALBUTEROL SULFATE 18-103MCG
3 AEROSOL WITH ADAPTER (GRAM) INHALATION EVERY 6 HOURS
Qty: 0 | Refills: 0 | Status: DISCONTINUED | OUTPATIENT
Start: 2017-05-22 | End: 2017-06-16

## 2017-05-22 RX ORDER — FLUCONAZOLE 150 MG/1
TABLET ORAL
Qty: 0 | Refills: 0 | Status: DISCONTINUED | OUTPATIENT
Start: 2017-05-22 | End: 2017-06-01

## 2017-05-22 RX ORDER — SODIUM CHLORIDE 9 MG/ML
1000 INJECTION, SOLUTION INTRAVENOUS ONCE
Qty: 0 | Refills: 0 | Status: COMPLETED | OUTPATIENT
Start: 2017-05-22 | End: 2017-05-22

## 2017-05-22 RX ORDER — PHENYLEPHRINE HYDROCHLORIDE 10 MG/ML
0.5 INJECTION INTRAVENOUS
Qty: 80 | Refills: 0 | Status: DISCONTINUED | OUTPATIENT
Start: 2017-05-22 | End: 2017-05-23

## 2017-05-22 RX ORDER — NOREPINEPHRINE BITARTRATE/D5W 8 MG/250ML
0.01 PLASTIC BAG, INJECTION (ML) INTRAVENOUS
Qty: 8 | Refills: 0 | Status: DISCONTINUED | OUTPATIENT
Start: 2017-05-22 | End: 2017-05-25

## 2017-05-22 RX ORDER — FLUCONAZOLE 150 MG/1
100 TABLET ORAL EVERY 24 HOURS
Qty: 0 | Refills: 0 | Status: DISCONTINUED | OUTPATIENT
Start: 2017-05-23 | End: 2017-06-01

## 2017-05-22 RX ORDER — FLUCONAZOLE 150 MG/1
100 TABLET ORAL ONCE
Qty: 0 | Refills: 0 | Status: COMPLETED | OUTPATIENT
Start: 2017-05-22 | End: 2017-05-22

## 2017-05-22 RX ADMIN — PIPERACILLIN AND TAZOBACTAM 25 GRAM(S): 4; .5 INJECTION, POWDER, LYOPHILIZED, FOR SOLUTION INTRAVENOUS at 05:41

## 2017-05-22 RX ADMIN — SODIUM CHLORIDE 500 MILLILITER(S): 9 INJECTION INTRAMUSCULAR; INTRAVENOUS; SUBCUTANEOUS at 18:30

## 2017-05-22 RX ADMIN — SODIUM CHLORIDE 500 MILLILITER(S): 9 INJECTION INTRAMUSCULAR; INTRAVENOUS; SUBCUTANEOUS at 04:54

## 2017-05-22 RX ADMIN — Medication 100 MILLIGRAM(S): at 05:41

## 2017-05-22 RX ADMIN — SODIUM CHLORIDE 1000 MILLILITER(S): 9 INJECTION INTRAMUSCULAR; INTRAVENOUS; SUBCUTANEOUS at 01:35

## 2017-05-22 RX ADMIN — Medication 100 MILLIGRAM(S): at 22:27

## 2017-05-22 RX ADMIN — SODIUM CHLORIDE 200 MILLILITER(S): 9 INJECTION INTRAMUSCULAR; INTRAVENOUS; SUBCUTANEOUS at 12:36

## 2017-05-22 RX ADMIN — Medication 1.8 MICROGRAM(S)/KG/MIN: at 20:29

## 2017-05-22 RX ADMIN — SODIUM CHLORIDE 1000 MILLILITER(S): 9 INJECTION, SOLUTION INTRAVENOUS at 08:40

## 2017-05-22 RX ADMIN — HEPARIN SODIUM 5000 UNIT(S): 5000 INJECTION INTRAVENOUS; SUBCUTANEOUS at 05:41

## 2017-05-22 RX ADMIN — FLUCONAZOLE 50 MILLIGRAM(S): 150 TABLET ORAL at 17:10

## 2017-05-22 RX ADMIN — Medication 3 MILLILITER(S): at 19:59

## 2017-05-22 RX ADMIN — HEPARIN SODIUM 5000 UNIT(S): 5000 INJECTION INTRAVENOUS; SUBCUTANEOUS at 22:27

## 2017-05-22 RX ADMIN — SODIUM CHLORIDE 200 MILLILITER(S): 9 INJECTION INTRAMUSCULAR; INTRAVENOUS; SUBCUTANEOUS at 17:23

## 2017-05-22 RX ADMIN — SODIUM CHLORIDE 200 MILLILITER(S): 9 INJECTION INTRAMUSCULAR; INTRAVENOUS; SUBCUTANEOUS at 22:27

## 2017-05-22 RX ADMIN — SODIUM CHLORIDE 1000 MILLILITER(S): 9 INJECTION INTRAMUSCULAR; INTRAVENOUS; SUBCUTANEOUS at 22:27

## 2017-05-22 RX ADMIN — SODIUM CHLORIDE 1000 MILLILITER(S): 9 INJECTION INTRAMUSCULAR; INTRAVENOUS; SUBCUTANEOUS at 15:23

## 2017-05-22 RX ADMIN — PIPERACILLIN AND TAZOBACTAM 25 GRAM(S): 4; .5 INJECTION, POWDER, LYOPHILIZED, FOR SOLUTION INTRAVENOUS at 00:32

## 2017-05-22 RX ADMIN — PIPERACILLIN AND TAZOBACTAM 25 GRAM(S): 4; .5 INJECTION, POWDER, LYOPHILIZED, FOR SOLUTION INTRAVENOUS at 18:43

## 2017-05-22 RX ADMIN — PANTOPRAZOLE SODIUM 40 MILLIGRAM(S): 20 TABLET, DELAYED RELEASE ORAL at 12:35

## 2017-05-22 RX ADMIN — Medication 100 MILLIGRAM(S): at 00:31

## 2017-05-22 RX ADMIN — Medication 100 MILLIGRAM(S): at 15:12

## 2017-05-22 RX ADMIN — PHENYLEPHRINE HYDROCHLORIDE 18.04 MICROGRAM(S)/KG/MIN: 10 INJECTION INTRAVENOUS at 16:20

## 2017-05-22 RX ADMIN — HEPARIN SODIUM 5000 UNIT(S): 5000 INJECTION INTRAVENOUS; SUBCUTANEOUS at 15:11

## 2017-05-22 RX ADMIN — SODIUM CHLORIDE 150 MILLILITER(S): 9 INJECTION INTRAMUSCULAR; INTRAVENOUS; SUBCUTANEOUS at 05:41

## 2017-05-22 NOTE — PROCEDURE NOTE - NSPROCDETAILS_GEN_ALL_CORE
lumen(s) aspirated and flushed/ultrasound guidance/guidewire recovered/sterile technique, catheter placed/sterile dressing applied

## 2017-05-22 NOTE — PROGRESS NOTE ADULT - ASSESSMENT
POD 1 from hartmanns procedure and ileocolic resection for extensive perforated diverticultis with sepsis. recommend increase IV fluids, check labs, continue IV abx, hospitalist consult, and renal consult and ID consult. increase ambulation and chest PT.

## 2017-05-22 NOTE — PROGRESS NOTE ADULT - SUBJECTIVE AND OBJECTIVE BOX
Events over last 24 hrs noted. Progressive oliguria and hypotension/tachychardia despite numerous fluid boluses. Transferred to ICU level of care for additional monitoring and vasopressor support.  Pt otherwise without significant complaints. Pain controlled. Feels thirsty.    MEDICATIONS  (STANDING):  HYDROmorphone PCA (1 mG/mL) 30milliLiter(s) PCA Continuous PCA Continuous  insulin lispro (HumaLOG) corrective regimen sliding scale  SubCutaneous three times a day before meals  dextrose 5%. 1000milliLiter(s) IV Continuous <Continuous>  dextrose 50% Injectable 12.5Gram(s) IV Push once  dextrose 50% Injectable 25Gram(s) IV Push once  dextrose 50% Injectable 25Gram(s) IV Push once  sodium chloride 0.9%. 1000milliLiter(s) IV Continuous <Continuous>  pantoprazole  Injectable 40milliGRAM(s) IV Push daily  metroNIDAZOLE  IVPB 500milliGRAM(s) IV Intermittent every 8 hours  heparin  Injectable 5000Unit(s) SubCutaneous every 8 hours  sodium chloride 0.9% Bolus 1000milliLiter(s) IV Bolus once  ALBUTerol/ipratropium for Nebulization 3milliLiter(s) Nebulizer every 6 hours  fluconAZOLE IVPB 100milliGRAM(s) IV Intermittent once  piperacillin/tazobactam IVPB. 3.375Gram(s) IV Intermittent every 12 hours  fluconAZOLE IVPB  IV Intermittent   sodium chloride 0.9%. 1000milliLiter(s) IV Continuous <Continuous>  phenylephrine    Infusion 0.5MICROgram(s)/kG/Min IV Continuous <Continuous>    MEDICATIONS  (PRN):  naloxone Injectable 0.1milliGRAM(s) IV Push every 3 minutes PRN For ANY of the following changes in patient status:  A. RR LESS THAN 10 breaths per minute, B. Oxygen saturation LESS THAN 90%, C. Sedation score of 6  ondansetron Injectable 4milliGRAM(s) IV Push every 6 hours PRN Nausea  ondansetron Injectable 4milliGRAM(s) IV Push every 6 hours PRN Nausea  dextrose Gel 1Dose(s) Oral once PRN Blood Glucose LESS THAN 70 milliGRAM(s)/deciliter  glucagon  Injectable 1milliGRAM(s) IntraMuscular once PRN Glucose LESS THAN 70 milligrams/deciliter  acetaminophen  Suppository 650milliGRAM(s) Rectal every 6 hours PRN For Temp greater than 38.5 C (101.3 F)    Vital Signs Last 24 Hrs  T(C): 37.6, Max: 37.6 (05-22 @ 15:45)  T(F): 99.6, Max: 99.6 (05-22 @ 15:45)  HR: 106 (80 - 118)  BP: 86/53 (75/31 - 175/42)  BP(mean): 60 (40 - 81)  RR: 26 (9 - 28)  SpO2: 96% (92% - 100%)  I&O's Detail  I & Os for 24h ending 22 May 2017 07:00  =============================================  IN:    Other: 6000 ml    Sodium Chloride 0.9% IV Bolus: 2000 ml    sodium chloride 0.9%: 1350 ml    IV PiggyBack: 325 ml    Total IN: 9675 ml  ---------------------------------------------  OUT:    Bulb: 365 ml    Indwelling Catheter - Urethral: 145 ml    Other: 125 ml    Nasoenteral Tube: 50 ml    Total OUT: 685 ml  ---------------------------------------------  Total NET: 8990 ml    I & Os for current day (as of 22 May 2017 17:36)  =============================================  IN:    sodium chloride 0.9%.: 1000 ml    phenylephrine   Infusion: 39.8 ml    Total IN: 1039.8 ml  ---------------------------------------------  OUT:    Nasoenteral Tube: 1450 ml    Bulb: 80 ml    Indwelling Catheter - Urethral: 70 ml    Total OUT: 1600 ml  ---------------------------------------------  Total NET: -560.2 ml    ABD exam :provena in place, LUIS M in pelvis with serosang drainage, colostomy viable with small amounts of liquid stool, soft, approp tender, distended                        18.7   34.6  )-----------( 282      ( 22 May 2017 07:47 )             57.7     05-22    145  |  113<H>  |  37<H>  ----------------------------<  142<H>  5.0   |  15<L>  |  3.28<H>    Ca    6.2<LL>      22 May 2017 14:00  Phos  3.4     05-22  Mg     2.0     05-22    POD 1 Ex-lap, Hartmanns, ileocolostomy for perforated sigmoid diverticulitis with SB phlegmon causing SBO;     On PCA dilaudid to good effect.  Jin gtt added for pressure support.  Cont NPO, NGT for now. Some colostomy output noted.  Renal consulted for progressive oliguria.   Remains hemoconcentrated despite massive fluid resuscitation. On hep SQ for DVT prop.  Afebrile, leukocytosis persists. ID consulted with additional of diflucan.   Cont IVF.  Appreciate input from Pulm, ID, renal and medical services.   Cont supportive care for massive postop SIRS response.   Pt's wife informed and updated regarding patient condition.

## 2017-05-22 NOTE — CONSULT NOTE ADULT - SUBJECTIVE AND OBJECTIVE BOX
HPI:    pat seen & examine & full consult dictated.    PAST MEDICAL & SURGICAL HISTORY:  Fatty liver  No significant past surgical history      MEDICATIONS  (STANDING):  HYDROmorphone PCA (1 mG/mL) 30milliLiter(s) PCA Continuous PCA Continuous  insulin lispro (HumaLOG) corrective regimen sliding scale  SubCutaneous three times a day before meals  dextrose 5%. 1000milliLiter(s) IV Continuous <Continuous>  dextrose 50% Injectable 12.5Gram(s) IV Push once  dextrose 50% Injectable 25Gram(s) IV Push once  dextrose 50% Injectable 25Gram(s) IV Push once  piperacillin/tazobactam IVPB. 3.375Gram(s) IV Intermittent every 8 hours  sodium chloride 0.9%. 1000milliLiter(s) IV Continuous <Continuous>  pantoprazole  Injectable 40milliGRAM(s) IV Push daily  piperacillin/tazobactam IVPB. 3.375Gram(s) IV Intermittent every 8 hours  metroNIDAZOLE  IVPB 500milliGRAM(s) IV Intermittent every 8 hours  heparin  Injectable 5000Unit(s) SubCutaneous every 8 hours  sodium chloride 0.9% Bolus 1000milliLiter(s) IV Bolus once  sodium chloride 0.9% Bolus 1000milliLiter(s) IV Bolus once    MEDICATIONS  (PRN):  ondansetron Injectable 4milliGRAM(s) IV Push once PRN Nausea and/or Vomiting  meperidine     Injectable 12.5milliGRAM(s) IV Push every 10 minutes PRN Shivering  naloxone Injectable 0.1milliGRAM(s) IV Push every 3 minutes PRN For ANY of the following changes in patient status:  A. RR LESS THAN 10 breaths per minute, B. Oxygen saturation LESS THAN 90%, C. Sedation score of 6  ondansetron Injectable 4milliGRAM(s) IV Push every 6 hours PRN Nausea  ondansetron Injectable 4milliGRAM(s) IV Push every 6 hours PRN Nausea  dextrose Gel 1Dose(s) Oral once PRN Blood Glucose LESS THAN 70 milliGRAM(s)/deciliter  glucagon  Injectable 1milliGRAM(s) IntraMuscular once PRN Glucose LESS THAN 70 milligrams/deciliter  acetaminophen  Suppository 650milliGRAM(s) Rectal every 6 hours PRN For Temp greater than 38.5 C (101.3 F)      Allergies    No Known Allergies    Intolerances        SOCIAL HISTORY: Denies tobacco, etoh abuse or illicit drug use    FAMILY HISTORY:  No pertinent family history in first degree relatives      Vital Signs Last 24 Hrs  T(C): 36.7, Max: 37.3 (05-21 @ 21:00)  T(F): 98.1, Max: 99.1 (05-21 @ 21:00)  HR: 109 (74 - 118)  BP: 95/49 (80/55 - 175/42)  BP(mean): 59 (54 - 81)  RR: 15 (9 - 20)  SpO2: 93% (92% - 98%)    REVIEW OF SYSTEMS:    CONSTITUTIONAL:  As per HPI.  HEENT:  Eyes:  No diplopia or blurred vision. ENT:  No earache, sore throat or runny nose.  CARDIOVASCULAR:  No pressure, squeezing, tightness, heaviness or aching about the chest, neck, axilla or epigastrium.  RESPIRATORY:  No cough, shortness of breath, PND or orthopnea.  GASTROINTESTINAL:  No nausea, vomiting or diarrhea.  GENITOURINARY:  No dysuria, frequency or urgency.  MUSCULOSKELETAL:  As per HPI.  SKIN:  No change in skin, hair or nails.  NEUROLOGIC:  No paresthesias, fasciculations, seizures or weakness.  PSYCHIATRIC:  No disorder of thought or mood.  ENDOCRINE:  No heat or cold intolerance, polyuria or polydipsia.  HEMATOLOGICAL:  No easy bruising or bleedings:  .     PHYSICAL EXAMINATION:    GENERAL APPEARANCE:  Pt. is not currently dyspneic, in no distress. Pt. is alert, oriented, and pleasant.  HEENT:  Pupils are normal and react normally. No icterus. Mucous membranes well colored.  NECK:  Supple. No lymphadenopathy. Jugular venous pressure not elevated. Carotids equal.   HEART:   The cardiac impulse has a normal quality. Regular. Normal S1 and S2. There are no murmurs, rubs or gallops noted  CHEST:  Chest rhonchi to auscultation. Normal respiratory effort.  ABDOMEN:  Soft and nontender.   EXTREMITIES:  There is no cyanosis, clubbing or edema.   SKIN:  No rash or significant lesions are noted.    LABS:                        18.7   34.6  )-----------( 282      ( 22 May 2017 07:47 )             57.7     05-22    145  |  116<H>  |  33<H>  ----------------------------<  138<H>  4.9   |  18<L>  |  2.81<H>    Ca    6.2<LL>      22 May 2017 08:57  Phos  3.4     05-22  Mg     2.0     05-22        RADIOLOGY & ADDITIONAL STUDIES:       HPI:  47M admitted for a 24hr h/o progressive abdominal pain that began after a 1-2 day trip to [facility]. The pain is localized to the suprapubic location, associated with bladder pressure and decreased po intake. No nausea, fevers/chills. Cont passing flatus and reports stools are somewhat diarrheal. WBC 18, CT with acute sigmoid diverticulitis, no drainable abscess or fluid collection, though small amounts of free fluid adjacent to appendix. (19 May 2017 18:07)      PAST MEDICAL & SURGICAL HISTORY:  Fatty liver  No significant past surgical history      MEDICATIONS  (STANDING):  HYDROmorphone PCA (1 mG/mL) 30milliLiter(s) PCA Continuous PCA Continuous  insulin lispro (HumaLOG) corrective regimen sliding scale  SubCutaneous three times a day before meals  dextrose 5%. 1000milliLiter(s) IV Continuous <Continuous>  dextrose 50% Injectable 12.5Gram(s) IV Push once  dextrose 50% Injectable 25Gram(s) IV Push once  dextrose 50% Injectable 25Gram(s) IV Push once  piperacillin/tazobactam IVPB. 3.375Gram(s) IV Intermittent every 8 hours  sodium chloride 0.9%. 1000milliLiter(s) IV Continuous <Continuous>  pantoprazole  Injectable 40milliGRAM(s) IV Push daily  piperacillin/tazobactam IVPB. 3.375Gram(s) IV Intermittent every 8 hours  metroNIDAZOLE  IVPB 500milliGRAM(s) IV Intermittent every 8 hours  heparin  Injectable 5000Unit(s) SubCutaneous every 8 hours  sodium chloride 0.9% Bolus 1000milliLiter(s) IV Bolus once  sodium chloride 0.9% Bolus 1000milliLiter(s) IV Bolus once    MEDICATIONS  (PRN):  ondansetron Injectable 4milliGRAM(s) IV Push once PRN Nausea and/or Vomiting  meperidine     Injectable 12.5milliGRAM(s) IV Push every 10 minutes PRN Shivering  naloxone Injectable 0.1milliGRAM(s) IV Push every 3 minutes PRN For ANY of the following changes in patient status:  A. RR LESS THAN 10 breaths per minute, B. Oxygen saturation LESS THAN 90%, C. Sedation score of 6  ondansetron Injectable 4milliGRAM(s) IV Push every 6 hours PRN Nausea  ondansetron Injectable 4milliGRAM(s) IV Push every 6 hours PRN Nausea  dextrose Gel 1Dose(s) Oral once PRN Blood Glucose LESS THAN 70 milliGRAM(s)/deciliter  glucagon  Injectable 1milliGRAM(s) IntraMuscular once PRN Glucose LESS THAN 70 milligrams/deciliter  acetaminophen  Suppository 650milliGRAM(s) Rectal every 6 hours PRN For Temp greater than 38.5 C (101.3 F)      Allergies    No Known Allergies    Intolerances        SOCIAL HISTORY: Denies tobacco, etoh abuse or illicit drug use    FAMILY HISTORY:  No pertinent family history in first degree relatives      Vital Signs Last 24 Hrs  T(C): 36.7, Max: 37.3 (05-21 @ 21:00)  T(F): 98.1, Max: 99.1 (05-21 @ 21:00)  HR: 109 (74 - 118)  BP: 95/49 (80/55 - 175/42)  BP(mean): 59 (54 - 81)  RR: 15 (9 - 20)  SpO2: 93% (92% - 98%)    REVIEW OF SYSTEMS:    CONSTITUTIONAL:  As per HPI.  HEENT:  Eyes:  No diplopia or blurred vision. ENT:  No earache, sore throat or runny nose.  CARDIOVASCULAR:  No pressure, squeezing, tightness, heaviness or aching about the chest, neck, axilla or epigastrium.  RESPIRATORY:  No cough, shortness of breath, PND or orthopnea.  GASTROINTESTINAL:  No nausea, vomiting or diarrhea.  GENITOURINARY:  No dysuria, frequency or urgency.  MUSCULOSKELETAL:  As per HPI.  SKIN:  No change in skin, hair or nails.  NEUROLOGIC:  No paresthesias, fasciculations, seizures or weakness.  PSYCHIATRIC:  No disorder of thought or mood.  ENDOCRINE:  No heat or cold intolerance, polyuria or polydipsia.  HEMATOLOGICAL:  No easy bruising or bleedings:  .     PHYSICAL EXAMINATION:    GENERAL APPEARANCE:  Pt. is not currently dyspneic, in no distress. Pt. is alert, oriented, and pleasant.  HEENT:  Pupils are normal and react normally. No icterus. Mucous membranes well colored.  NECK:  Supple. No lymphadenopathy. Jugular venous pressure not elevated. Carotids equal.   HEART:   The cardiac impulse has a normal quality. Regular. Normal S1 and S2. There are no murmurs, rubs or gallops noted  CHEST:  Chest is clear to auscultation. Normal respiratory effort.  ABDOMEN:  Soft and nontender.   EXTREMITIES:  There is no cyanosis, clubbing or edema.   SKIN:  No rash or significant lesions are noted.    LABS:                        18.7   34.6  )-----------( 282      ( 22 May 2017 07:47 )             57.7     05-22    145  |  116<H>  |  33<H>  ----------------------------<  138<H>  4.9   |  18<L>  |  2.81<H>    Ca    6.2<LL>      22 May 2017 08:57  Phos  3.4     05-22  Mg     2.0     05-22                    RADIOLOGY & ADDITIONAL STUDIES:

## 2017-05-22 NOTE — CONSULT NOTE ADULT - ASSESSMENT
47M with no significant past medical history admitted on 5/19 for evaluation of lower abdominal pain, decreased appetite and upon admission was found to have sigmoid diverticulitis; patient was medically managed however, on 5/21 patient underwent sigmoid resection, ileocolostomy and currently is post op asking for ice chips. He has decreased urine output and worsening renal function as well as hypotension  1. Post op leukocytosis, hypotension most likely secondary to peritonitis  - follow up cultures   - agree with zosyn but will change to q 12 hours dosing  - will add diflucan for yeast coverage  - iv hydration and supportive care   - renal evaluation in progress  - wound care per surgery  Will follow

## 2017-05-22 NOTE — CONSULT NOTE ADULT - ASSESSMENT
# HARDIK/ ATN with minimal UOP  # Metabolic acidosis  # S/p ileocolic resection for perforated signmoid    PLAN  - NS at 200ml/hr for next 6 hrs and then maintain NS at 150 ml/hr to maintain MAP at 60s. May require pressors if unable to maintain MAP with this regimen.     - lactate and repeat BMP   - strict 1/0 # HARDIK/ ATN with minimal UOP  # Metabolic acidosis  # S/p ileocolic resection for perforated signmoid    PLAN  - NS at 200ml/hr for next 6 hrs and then maintain NS at 150 ml/hr to maintain MAP at 60s. May require pressors if unable to maintain MAP with this regimen.     - lactate and repeat BMP   - strict 1/0    - dose meds for crcl of less than 10 ml/min ( zosyn)

## 2017-05-22 NOTE — CONSULT NOTE ADULT - SUBJECTIVE AND OBJECTIVE BOX
HPI:  47M with no significant past medical history admitted on 5/19 for evaluation of lower abdominal pain, decreased appetite and upon admission was found to have sigmoid diverticulitis; patient was medically managed however, on 5/21 patient underwent sigmoid resection, ileocolostomy and currently is post op asking for ice chips. He has decreased urine output and worsening renal function as well as hypotension.        PMH: as above  PSH: as above  Meds: per reconcilation sheet, noted below  MEDICATIONS  (STANDING):  HYDROmorphone PCA (1 mG/mL) 30milliLiter(s) PCA Continuous PCA Continuous  insulin lispro (HumaLOG) corrective regimen sliding scale  SubCutaneous three times a day before meals  dextrose 5%. 1000milliLiter(s) IV Continuous <Continuous>  dextrose 50% Injectable 12.5Gram(s) IV Push once  dextrose 50% Injectable 25Gram(s) IV Push once  dextrose 50% Injectable 25Gram(s) IV Push once  sodium chloride 0.9%. 1000milliLiter(s) IV Continuous <Continuous>  pantoprazole  Injectable 40milliGRAM(s) IV Push daily  metroNIDAZOLE  IVPB 500milliGRAM(s) IV Intermittent every 8 hours  heparin  Injectable 5000Unit(s) SubCutaneous every 8 hours  sodium chloride 0.9% Bolus 1000milliLiter(s) IV Bolus once  sodium chloride 0.9% Bolus 1000milliLiter(s) IV Bolus once  ALBUTerol/ipratropium for Nebulization 3milliLiter(s) Nebulizer every 6 hours  piperacillin/tazobactam IVPB. 3.375Gram(s) IV Intermittent every 12 hours  fluconAZOLE IVPB  IV Intermittent     MEDICATIONS  (PRN):  naloxone Injectable 0.1milliGRAM(s) IV Push every 3 minutes PRN For ANY of the following changes in patient status:  A. RR LESS THAN 10 breaths per minute, B. Oxygen saturation LESS THAN 90%, C. Sedation score of 6  ondansetron Injectable 4milliGRAM(s) IV Push every 6 hours PRN Nausea  ondansetron Injectable 4milliGRAM(s) IV Push every 6 hours PRN Nausea  dextrose Gel 1Dose(s) Oral once PRN Blood Glucose LESS THAN 70 milliGRAM(s)/deciliter  glucagon  Injectable 1milliGRAM(s) IntraMuscular once PRN Glucose LESS THAN 70 milligrams/deciliter  acetaminophen  Suppository 650milliGRAM(s) Rectal every 6 hours PRN For Temp greater than 38.5 C (101.3 F)    Allergies    No Known Allergies    Intolerances      Social: no smoking, no alcohol, no illegal drugs; recently travelled to [place]; no exposure to TB  FAMILY HISTORY:  No pertinent family history in first degree relatives    ROS: the patient has no fever, no chills, no HA, no dizziness, no sore throat, no blurry vision, no CP, no palpitations, no SOB, no cough, no diarrhea, no N/V, no dysuria, no leg pain, no claudication, no rash, no joint aches, no rectal pain or bleeding, no night sweats  Vital Signs Last 24 Hrs  T(C): 36.7, Max: 37.3 (05-21 @ 21:00)  T(F): 98.1, Max: 99.1 (05-21 @ 21:00)  HR: 115 (80 - 118)  BP: 91/49 (80/55 - 175/42)  BP(mean): 58 (54 - 81)  RR: 25 (9 - 25)  SpO2: 100% (92% - 100%)  Daily     Daily   Constitutional: frail looking  HEENT: NC/AT, EOMI, PERRLA  Neck: supple  Respiratory: clear  Cardiovascular: S1S2 regular, no murmurs  Abdomen: kimmy drain in place, wound with wound vac, left sided ostomy  Genitourinary: deferred  Rectal: deferred  Musculoskeletal: no muscle tenderness, no joint swelling or tenderness  Neurological: AxOx3, moving all extremities, no focal deficits  Skin: no rashes                          18.7   34.6  )-----------( 282      ( 22 May 2017 07:47 )             57.7     05-22    145  |  116<H>  |  33<H>  ----------------------------<  138<H>  4.9   |  18<L>  |  2.81<H>    Ca    6.2<LL>      22 May 2017 08:57  Phos  3.4     05-22  Mg     2.0     05-22               Radiology:EXAM:  CT ABDOMEN AND PELVIS IC                            PROCEDURE DATE:  05/19/2017        INTERPRETATION:  CLINICAL INFORMATION: 47-year-old man with abdominal   pain assess for appendicitis     TECHNIQUE:    CT of abdomen and pelvis was performed with axial images   were obtained from the diaphragm to pubic symphysis oral and IV contrast.    COMPARISON:  None.    FINDINGS:    Liver: Borderline hepatomegaly with mild fatty infiltration otherwise   within normal limits  Gallbladder: Within normal limits  Bile ducts: No intrahepatic or extrahepatic biliary dilatation  Pancreas: within normal limits    Spleen: within normal limits  Adrenal: within normal limits  Kidneys, Ureters and Bladder: Symmetric enhancement bilaterally. No   perinephric attending or collections. No hydronephrosis. No intrarenal   calculi. Normal caliber of the ureters. Limited unopacified nondistended   bladder.    Pelvis: No pelvic adenopathy or pelvic free fluid.  Small fat-containing   bilateral inguinal hernias.      Bowel: No bowel obstruction.  There are few scattered colonic   diverticula. There is focal thickening of sigmoid colon in the pelvis   associated with mesenteric fat stranding and thickening of adjacent   fascial planes with localized perforation and small air bubbles without   abscess. Findings are consistent with acute rupture diverticulitis. Small   free fluid adjacent to gas filled appendix.    Peritoneum: Small ascites, no organized fluid collections.    Retroperitoneum: within normal limits  Vessels: Patent.    Abdominal wall: Small fat-containing umbilical hernia.    Lower chest and lung Bases: The visualized lung bases clear except for   subsegmental dependent atelectasis. Heart normal in size.   Bones: Degenerative changes.     IMPRESSION:     Focal thickening of sigmoid colon with mesenteric fat stranding and   thickening of fascial planes with scattered adjacent air bubbles   consistent with acute diverticulitis without abscess. Small free fluid in   the abdomen extending to the right quadrant.        Advanced directive addressed: full resuscitation

## 2017-05-22 NOTE — PROGRESS NOTE ADULT - SUBJECTIVE AND OBJECTIVE BOX
called by concerned SDU staff - pt with persistent hypotension, tachycardia and elevated lactate level post-op from colon resection for diverticulitis with abscess.  Pt rec'd approx 12 liters since OR.  Has been maintaining MAP 55-60mmHg.  Very poor urine output.  ~ 250ml overnight, 150ml during day shift to time of transfer to ICU.    Seen and examined in SDU - transferred immediately to ICU.  2000ml IVF bolus administered and pressors initiated.  CVL placed.    Per records, d/w patient - he is a 46 yo M admitted for a 24hr h/o progressive abdominal pain  localized to the suprapubic location, associated with bladder pressure and decreased po intake. No nausea, fevers/chills. Cont passing flatus and reports stools are somewhat diarrheal. WBC 18, CT with acute sigmoid diverticulitis, no drainable abscess or fluid collection, though small amounts of free fluid adjacent to appendix.   The pt was admitted on  and medically managed but on  patient underwent sigmoid resection, ileocolostomy.      PAST MEDICAL & SURGICAL HISTORY:  Fatty liver  No significant past surgical history    Allergies    No Known Allergies    Height (cm): 182.9 ( @ 20:36)    ICU Vital Signs Last 24 Hrs  T(C): 37.6, Max: 37.6 (05- @ 15:45)  T(F): 99.6, Max: 99.6 (- @ 15:45)  HR: 100 (80 - 118)  BP: 112/55 (62/25 - 121/68)  BP(mean): 68 (30 - 81)  ABP: --  ABP(mean): --  RR: 28 (9 - 29)  SpO2: 98% (92% - 100%)          I&O's Summary  I & Os for 24h ending 22 May 2017 07:00  =============================================  IN: 9675 ml / OUT: 685 ml / NET: 8990 ml    I & Os for current day (as of 22 May 2017 20:33)  =============================================  IN: 2119.4 ml / OUT: 1847 ml / NET: 272.4 ml                            18.7   34.6  )-----------( 282      ( 22 May 2017 07:47 )             57.7       -    145  |  113<H>  |  37<H>  ----------------------------<  142<H>  5.0   |  15<L>  |  3.28<H>    Ca    6.2<LL>      22 May 2017 14:00  Phos  3.4       Mg     2.0             CAPILLARY BLOOD GLUCOSE  103 (22 May 2017 18:30)  134 (22 May 2017 12:06)  117 (22 May 2017 06:00)  130 (22 May 2017 00:00)    Urinalysis Basic - ( 22 May 2017 15:00 )    Color: Gabi / Appearance: Clear / S.020 / pH: x  Gluc: x / Ketone: Trace  / Bili: Moderate / Urobili: 4 mg/dL   Blood: x / Protein: 30 mg/dL / Nitrite: Positive   Leuk Esterase: Trace / RBC: 25-50 /HPF / WBC 6-10   Sq Epi: x / Non Sq Epi: x / Bacteria: Many        MEDICATIONS  (STANDING):  HYDROmorphone PCA (1 mG/mL) 30milliLiter(s) PCA Continuous PCA Continuous  insulin lispro (HumaLOG) corrective regimen sliding scale  SubCutaneous three times a day before meals  dextrose 5%. 1000milliLiter(s) IV Continuous <Continuous>  dextrose 50% Injectable 12.5Gram(s) IV Push once  dextrose 50% Injectable 25Gram(s) IV Push once  dextrose 50% Injectable 25Gram(s) IV Push once  sodium chloride 0.9%. 1000milliLiter(s) IV Continuous <Continuous>  pantoprazole  Injectable 40milliGRAM(s) IV Push daily  metroNIDAZOLE  IVPB 500milliGRAM(s) IV Intermittent every 8 hours  heparin  Injectable 5000Unit(s) SubCutaneous every 8 hours  ALBUTerol/ipratropium for Nebulization 3milliLiter(s) Nebulizer every 6 hours  piperacillin/tazobactam IVPB. 3.375Gram(s) IV Intermittent every 12 hours  fluconAZOLE IVPB  IV Intermittent   sodium chloride 0.9%. 1000milliLiter(s) IV Continuous <Continuous>  phenylephrine    Infusion 0.5MICROgram(s)/kG/Min IV Continuous <Continuous>  norepinephrine Infusion 0.01MICROgram(s)/kG/Min IV Continuous <Continuous>    MEDICATIONS  (PRN):  naloxone Injectable 0.1milliGRAM(s) IV Push every 3 minutes PRN For ANY of the following changes in patient status:  A. RR LESS THAN 10 breaths per minute, B. Oxygen saturation LESS THAN 90%, C. Sedation score of 6  ondansetron Injectable 4milliGRAM(s) IV Push every 6 hours PRN Nausea  ondansetron Injectable 4milliGRAM(s) IV Push every 6 hours PRN Nausea  dextrose Gel 1Dose(s) Oral once PRN Blood Glucose LESS THAN 70 milliGRAM(s)/deciliter  glucagon  Injectable 1milliGRAM(s) IntraMuscular once PRN Glucose LESS THAN 70 milligrams/deciliter  acetaminophen  Suppository 650milliGRAM(s) Rectal every 6 hours PRN For Temp greater than 38.5 C (101.3 F)          DVT Prophylaxis: SQ heparin, venodynes    Advanced Directives: FULL  Discussed with:  Patient    Visit Information: Critical care time 75 min excluding teaching/procedures/family updates.    ** Time is exclusive of billed procedures and/or teaching and/or routine family updates.

## 2017-05-22 NOTE — PROCEDURE NOTE - ADDITIONAL PROCEDURE DETAILS
CXR reviewed post-procedure - tip in SVC - NO evidence of PTX - OGT in appropriate position as well.

## 2017-05-22 NOTE — CONSULT NOTE ADULT - ASSESSMENT
PROBLEMS:    Diverticulitis of intestine with perforation S/P LAP  DYSNEA  HYPOXAMIA  ATELECTASIS  COPD    PLAN;    AEROSOLS  FIO2  SUPPORTIVE CARE  DVT PROPHYLASIX PROBLEMS:    Diverticulitis of intestine with perforation S/P LAP  DYSNEA  HYPOXAMIA  ATELECTASIS  COPD    PLAN;    iv abx  AEROSOLS  FIO2  SUPPORTIVE CARE  DVT PROPHYLASIX

## 2017-05-22 NOTE — PROGRESS NOTE ADULT - SUBJECTIVE AND OBJECTIVE BOX
Patient seen and examined. POD 1 from exploration with Hartmanns procedure and ileocolic resection for extensive diverticultis with perforation. patient is without complaints except wishes to drink water. denies any significant pain.   Vital, BP 95/65 HR 99, RR 12 sat 100%  AA ox3 NAD  abd: softly distended, -BS, ostomy is pink and viable, no air or stool.  labs: still elevated , hemoconcentrated,  lytes pending

## 2017-05-22 NOTE — CONSULT NOTE ADULT - SUBJECTIVE AND OBJECTIVE BOX
NEPHROLOGY INTERVAL HPI/OVERNIGHT EVENTS:  46 y/o WM with no significant PMHX. presents after being in [place] for 2 days and noted with suprapubic pain with assoicated poor PO intake.  No N/V/D until yesterday with vomiting.  No NSAID use PTA.   No abx use.      Admitted 3 days ago with Divertricular perforation.  Pt was given abx and IVF.  Pt did not respond to conservative measures and yesterday with rising WBC and Cr, pt  was taken to OR for ileocolic r resection with Eliud's procedure.    Since out of PACU, pt has received 11L of NS or LR./  UOP has remained minimal post-op with sbp in 90s.      Pt alert, awake and in moderate pain.  NG in place.      PAST MEDICAL & SURGICAL HISTORY:  Fatty liver  No significant past surgical history      FAMILY HISTORY:  No pertinent family history in first degree relatives    MEDS at home  Milk Thistle    MEDICATIONS  (STANDING):  HYDROmorphone PCA (1 mG/mL) 30milliLiter(s) PCA Continuous PCA Continuous  insulin lispro (HumaLOG) corrective regimen sliding scale  SubCutaneous three times a day before meals  dextrose 5%. 1000milliLiter(s) IV Continuous <Continuous>  dextrose 50% Injectable 12.5Gram(s) IV Push once  dextrose 50% Injectable 25Gram(s) IV Push once  dextrose 50% Injectable 25Gram(s) IV Push once  piperacillin/tazobactam IVPB. 3.375Gram(s) IV Intermittent every 8 hours  sodium chloride 0.9%. 1000milliLiter(s) IV Continuous <Continuous>  pantoprazole  Injectable 40milliGRAM(s) IV Push daily  piperacillin/tazobactam IVPB. 3.375Gram(s) IV Intermittent every 8 hours  metroNIDAZOLE  IVPB 500milliGRAM(s) IV Intermittent every 8 hours  heparin  Injectable 5000Unit(s) SubCutaneous every 8 hours  sodium chloride 0.9% Bolus 1000milliLiter(s) IV Bolus once  sodium chloride 0.9% Bolus 1000milliLiter(s) IV Bolus once  ALBUTerol/ipratropium for Nebulization 3milliLiter(s) Nebulizer every 6 hours    MEDICATIONS  (PRN):  naloxone Injectable 0.1milliGRAM(s) IV Push every 3 minutes PRN For ANY of the following changes in patient status:  A. RR LESS THAN 10 breaths per minute, B. Oxygen saturation LESS THAN 90%, C. Sedation score of 6  ondansetron Injectable 4milliGRAM(s) IV Push every 6 hours PRN Nausea  ondansetron Injectable 4milliGRAM(s) IV Push every 6 hours PRN Nausea  dextrose Gel 1Dose(s) Oral once PRN Blood Glucose LESS THAN 70 milliGRAM(s)/deciliter  glucagon  Injectable 1milliGRAM(s) IntraMuscular once PRN Glucose LESS THAN 70 milligrams/deciliter  acetaminophen  Suppository 650milliGRAM(s) Rectal every 6 hours PRN For Temp greater than 38.5 C (101.3 F)      Allergies    No Known Allergies    Intolerances        I&O's Summary  I & Os for 24h ending 22 May 2017 07:00  =============================================  IN: 9675 ml / OUT: 685 ml / NET: 8990 ml    I & Os for current day (as of 22 May 2017 13:18)  =============================================  IN: 0 ml / OUT: 290 ml / NET: -290 ml    Vital Signs Last 24 Hrs  T(C): 36.7, Max: 37.3 (05-21 @ 21:00)  T(F): 98.1, Max: 99.1 (05-21 @ 21:00)  HR: 109 (78 - 118)  BP: 94/49 (80/55 - 175/42)  BP(mean): 60 (54 - 81)  RR: 17 (9 - 20)  SpO2: 97% (92% - 99%)  Daily     Daily   I&O's Summary  I & Os for 24h ending 22 May 2017 07:00  =============================================  IN: 9675 ml / OUT: 685 ml / NET: 8990 ml    I & Os for current day (as of 22 May 2017 13:18)  =============================================  IN: 0 ml / OUT: 290 ml / NET: -290 ml      PHYSICAL EXAM:  GEN: alert awake O X 3  HEENT: MMM, NG tub in place   NECK supple no jvd  CV: RRR s1s2  LUNGS: b/l CTA  ABD: hypoactive BS with mild distension ,   EXT: no edema, scd in place    LABS:                        18.7   34.6  )-----------( 282      ( 22 May 2017 07:47 )             57.7     05-22    145  |  116<H>  |  33<H>  ----------------------------<  138<H>  4.9   |  18<L>  |  2.81<H>    Ca    6.2<LL>      22 May 2017 08:57  Phos  3.4     05-22  Mg     2.0     05-22          Magnesium, Serum: 2.0 mg/dL (05-22 @ 08:57)  Phosphorus Level, Serum: 3.4 mg/dL (05-22 @ 08:57)

## 2017-05-22 NOTE — PROGRESS NOTE ADULT - ASSESSMENT
48yo M s/p resection of sigmoid diverticulitis with abscess on 5/21 now in severe sepsis and septic shock  HARDIK due to ATN  metabolic lactic acidosis    Plan at this time is for continued care in ICU  HOB 30  empiric ABx - appreciate ID input  IVF  Pressors  CVL in place   Repeat lactate at 22:00  GI and DVT prophylaxis as appropriate  supportive care    case d/w pt in detail and all questions answered.  spoke with Dr Doe regarding case as well.

## 2017-05-23 LAB
ANION GAP SERPL CALC-SCNC: 12 MMOL/L — SIGNIFICANT CHANGE UP (ref 5–17)
BASE EXCESS BLDA CALC-SCNC: -14 MMOL/L — LOW (ref -2–2)
BLOOD GAS COMMENTS ARTERIAL: SIGNIFICANT CHANGE UP
BUN SERPL-MCNC: 48 MG/DL — HIGH (ref 7–23)
CALCIUM SERPL-MCNC: 5.4 MG/DL — CRITICAL LOW (ref 8.5–10.1)
CHLORIDE SERPL-SCNC: 121 MMOL/L — HIGH (ref 96–108)
CO2 SERPL-SCNC: 14 MMOL/L — LOW (ref 22–31)
CREAT ?TM UR-MCNC: 315 MG/DL — SIGNIFICANT CHANGE UP
CREAT SERPL-MCNC: 2.88 MG/DL — HIGH (ref 0.5–1.3)
GAS PNL BLDA: SIGNIFICANT CHANGE UP
GLUCOSE SERPL-MCNC: 130 MG/DL — HIGH (ref 70–99)
HCO3 BLDA-SCNC: 13 MMOL/L — LOW (ref 21–29)
HCT VFR BLD CALC: 40.8 % — SIGNIFICANT CHANGE UP (ref 39–50)
HCT VFR BLD CALC: 44.7 % — SIGNIFICANT CHANGE UP (ref 39–50)
HGB BLD-MCNC: 13.9 G/DL — SIGNIFICANT CHANGE UP (ref 13–17)
HGB BLD-MCNC: 14.9 G/DL — SIGNIFICANT CHANGE UP (ref 13–17)
LACTATE SERPL-SCNC: 1.8 MMOL/L — SIGNIFICANT CHANGE UP (ref 0.7–2)
LACTATE SERPL-SCNC: 2.3 MMOL/L — HIGH (ref 0.7–2)
MAGNESIUM SERPL-MCNC: 1.8 MG/DL — SIGNIFICANT CHANGE UP (ref 1.6–2.6)
MCHC RBC-ENTMCNC: 29.8 PG — SIGNIFICANT CHANGE UP (ref 27–34)
MCHC RBC-ENTMCNC: 30.7 PG — SIGNIFICANT CHANGE UP (ref 27–34)
MCHC RBC-ENTMCNC: 33.2 GM/DL — SIGNIFICANT CHANGE UP (ref 32–36)
MCHC RBC-ENTMCNC: 34 GM/DL — SIGNIFICANT CHANGE UP (ref 32–36)
MCV RBC AUTO: 89.6 FL — SIGNIFICANT CHANGE UP (ref 80–100)
MCV RBC AUTO: 90.1 FL — SIGNIFICANT CHANGE UP (ref 80–100)
PCO2 BLDA: 35 MMHG — SIGNIFICANT CHANGE UP (ref 32–46)
PH BLDA: 7.19 — CRITICAL LOW (ref 7.35–7.45)
PHOSPHATE SERPL-MCNC: 3.4 MG/DL — SIGNIFICANT CHANGE UP (ref 2.5–4.5)
PLATELET # BLD AUTO: 194 K/UL — SIGNIFICANT CHANGE UP (ref 150–400)
PLATELET # BLD AUTO: 266 K/UL — SIGNIFICANT CHANGE UP (ref 150–400)
PO2 BLDA: 68 — SIGNIFICANT CHANGE UP
POTASSIUM SERPL-MCNC: 4.9 MMOL/L — SIGNIFICANT CHANGE UP (ref 3.5–5.3)
POTASSIUM SERPL-SCNC: 4.9 MMOL/L — SIGNIFICANT CHANGE UP (ref 3.5–5.3)
RBC # BLD: 4.53 M/UL — SIGNIFICANT CHANGE UP (ref 4.2–5.8)
RBC # BLD: 5 M/UL — SIGNIFICANT CHANGE UP (ref 4.2–5.8)
RBC # FLD: 12.3 % — SIGNIFICANT CHANGE UP (ref 10.3–14.5)
RBC # FLD: 12.7 % — SIGNIFICANT CHANGE UP (ref 10.3–14.5)
SAO2 % BLDA: 89 — SIGNIFICANT CHANGE UP
SODIUM SERPL-SCNC: 147 MMOL/L — HIGH (ref 135–145)
SODIUM UR-SCNC: 20 MMOL/L — SIGNIFICANT CHANGE UP
WBC # BLD: 27.7 K/UL — HIGH (ref 3.8–10.5)
WBC # BLD: 34.1 K/UL — HIGH (ref 3.8–10.5)
WBC # FLD AUTO: 27.7 K/UL — HIGH (ref 3.8–10.5)
WBC # FLD AUTO: 34.1 K/UL — HIGH (ref 3.8–10.5)

## 2017-05-23 PROCEDURE — 71010: CPT | Mod: 26

## 2017-05-23 RX ORDER — ACETAMINOPHEN 500 MG
1000 TABLET ORAL EVERY 6 HOURS
Qty: 0 | Refills: 0 | Status: COMPLETED | OUTPATIENT
Start: 2017-05-23 | End: 2017-05-23

## 2017-05-23 RX ORDER — SODIUM CHLORIDE 9 MG/ML
1000 INJECTION INTRAMUSCULAR; INTRAVENOUS; SUBCUTANEOUS ONCE
Qty: 0 | Refills: 0 | Status: COMPLETED | OUTPATIENT
Start: 2017-05-23 | End: 2017-05-23

## 2017-05-23 RX ORDER — SODIUM BICARBONATE 1 MEQ/ML
0.23 SYRINGE (ML) INTRAVENOUS
Qty: 150 | Refills: 0 | Status: DISCONTINUED | OUTPATIENT
Start: 2017-05-23 | End: 2017-05-25

## 2017-05-23 RX ORDER — SODIUM CHLORIDE 9 MG/ML
2000 INJECTION INTRAMUSCULAR; INTRAVENOUS; SUBCUTANEOUS ONCE
Qty: 0 | Refills: 0 | Status: DISCONTINUED | OUTPATIENT
Start: 2017-05-23 | End: 2017-05-24

## 2017-05-23 RX ORDER — MORPHINE SULFATE 50 MG/1
2 CAPSULE, EXTENDED RELEASE ORAL EVERY 4 HOURS
Qty: 0 | Refills: 0 | Status: DISCONTINUED | OUTPATIENT
Start: 2017-05-23 | End: 2017-05-29

## 2017-05-23 RX ORDER — CALCIUM CHLORIDE
2 POWDER (GRAM) MISCELLANEOUS ONCE
Qty: 0 | Refills: 0 | Status: DISCONTINUED | OUTPATIENT
Start: 2017-05-23 | End: 2017-05-23

## 2017-05-23 RX ORDER — CALCIUM GLUCONATE 100 MG/ML
2 VIAL (ML) INTRAVENOUS ONCE
Qty: 0 | Refills: 0 | Status: COMPLETED | OUTPATIENT
Start: 2017-05-23 | End: 2017-05-23

## 2017-05-23 RX ORDER — FUROSEMIDE 40 MG
10 TABLET ORAL ONCE
Qty: 0 | Refills: 0 | Status: COMPLETED | OUTPATIENT
Start: 2017-05-23 | End: 2017-05-23

## 2017-05-23 RX ORDER — VASOPRESSIN 20 [USP'U]/ML
0.04 INJECTION INTRAVENOUS
Qty: 100 | Refills: 0 | Status: DISCONTINUED | OUTPATIENT
Start: 2017-05-23 | End: 2017-05-25

## 2017-05-23 RX ADMIN — Medication 200 GRAM(S): at 11:03

## 2017-05-23 RX ADMIN — SODIUM CHLORIDE 1000 MILLILITER(S): 9 INJECTION INTRAMUSCULAR; INTRAVENOUS; SUBCUTANEOUS at 02:27

## 2017-05-23 RX ADMIN — HEPARIN SODIUM 5000 UNIT(S): 5000 INJECTION INTRAVENOUS; SUBCUTANEOUS at 14:06

## 2017-05-23 RX ADMIN — Medication 100 MILLIGRAM(S): at 05:20

## 2017-05-23 RX ADMIN — SODIUM CHLORIDE 1000 MILLILITER(S): 9 INJECTION INTRAMUSCULAR; INTRAVENOUS; SUBCUTANEOUS at 06:28

## 2017-05-23 RX ADMIN — Medication 1.8 MICROGRAM(S)/KG/MIN: at 15:18

## 2017-05-23 RX ADMIN — Medication 1.8 MICROGRAM(S)/KG/MIN: at 23:35

## 2017-05-23 RX ADMIN — FLUCONAZOLE 50 MILLIGRAM(S): 150 TABLET ORAL at 14:05

## 2017-05-23 RX ADMIN — PIPERACILLIN AND TAZOBACTAM 25 GRAM(S): 4; .5 INJECTION, POWDER, LYOPHILIZED, FOR SOLUTION INTRAVENOUS at 05:19

## 2017-05-23 RX ADMIN — SODIUM CHLORIDE 200 MILLILITER(S): 9 INJECTION INTRAMUSCULAR; INTRAVENOUS; SUBCUTANEOUS at 08:47

## 2017-05-23 RX ADMIN — SODIUM CHLORIDE 1000 MILLILITER(S): 9 INJECTION INTRAMUSCULAR; INTRAVENOUS; SUBCUTANEOUS at 05:23

## 2017-05-23 RX ADMIN — VASOPRESSIN 2.4 UNIT(S)/MIN: 20 INJECTION INTRAVENOUS at 07:45

## 2017-05-23 RX ADMIN — Medication 150 MEQ/KG/HR: at 10:19

## 2017-05-23 RX ADMIN — Medication 1.8 MICROGRAM(S)/KG/MIN: at 11:05

## 2017-05-23 RX ADMIN — Medication 1.8 MICROGRAM(S)/KG/MIN: at 19:45

## 2017-05-23 RX ADMIN — Medication 3 MILLILITER(S): at 19:22

## 2017-05-23 RX ADMIN — Medication 3 MILLILITER(S): at 14:22

## 2017-05-23 RX ADMIN — Medication 150 MEQ/KG/HR: at 18:17

## 2017-05-23 RX ADMIN — Medication 400 MILLIGRAM(S): at 07:00

## 2017-05-23 RX ADMIN — Medication 10 MILLIGRAM(S): at 22:16

## 2017-05-23 RX ADMIN — Medication 3 MILLILITER(S): at 07:48

## 2017-05-23 RX ADMIN — Medication 1.8 MICROGRAM(S)/KG/MIN: at 02:02

## 2017-05-23 RX ADMIN — PIPERACILLIN AND TAZOBACTAM 25 GRAM(S): 4; .5 INJECTION, POWDER, LYOPHILIZED, FOR SOLUTION INTRAVENOUS at 17:18

## 2017-05-23 RX ADMIN — HEPARIN SODIUM 5000 UNIT(S): 5000 INJECTION INTRAVENOUS; SUBCUTANEOUS at 14:00

## 2017-05-23 RX ADMIN — Medication 100 MILLIGRAM(S): at 21:20

## 2017-05-23 RX ADMIN — SODIUM CHLORIDE 200 MILLILITER(S): 9 INJECTION INTRAMUSCULAR; INTRAVENOUS; SUBCUTANEOUS at 03:05

## 2017-05-23 RX ADMIN — HEPARIN SODIUM 5000 UNIT(S): 5000 INJECTION INTRAVENOUS; SUBCUTANEOUS at 05:20

## 2017-05-23 RX ADMIN — Medication 100 MILLIGRAM(S): at 14:06

## 2017-05-23 RX ADMIN — Medication 1.8 MICROGRAM(S)/KG/MIN: at 06:14

## 2017-05-23 RX ADMIN — PANTOPRAZOLE SODIUM 40 MILLIGRAM(S): 20 TABLET, DELAYED RELEASE ORAL at 11:29

## 2017-05-23 NOTE — PROGRESS NOTE ADULT - ASSESSMENT
47M with no significant past medical history admitted on 5/19 for evaluation of lower abdominal pain, decreased appetite and upon admission was found to have sigmoid diverticulitis; patient was medically managed however, on 5/21 patient underwent sigmoid resection, ileocolostomy and currently is post op asking for ice chips. He has decreased urine output and worsening renal function as well as hypotension  1. Post op leukocytosis, hypotension most likely secondary to peritonitis  - follow up cultures   - day #5 zosyn, day #2 diflucan  - tolerating antibiotics without rashes or side effects   - iv hydration and supportive care   -pressors per icu  - may need intubation   - renal evaluation in progress  - wound care per surgery  -discussed in detail with wife and intensivist at bedside  Will follow

## 2017-05-23 NOTE — PROGRESS NOTE ADULT - SUBJECTIVE AND OBJECTIVE BOX
Continues to have pressor requirement with vasopressin added this am to Jin. Denies abdominal pain. Has recieved 6L bolus since transfer to ICU.     Exam:  ICU Vital Signs Last 24 Hrs  T(C): 36.3, Max: 38.6 (05-23 @ 06:00)  T(F): 97.3, Max: 101.5 (05-23 @ 06:00)  HR: 116 (96 - 132)  BP: 117/68 (39/30 - 117/68)  BP(mean): 77 (30 - 77)  RR: 17 (13 - 29)  SpO2: 96% (90% - 100%)    General: in no distress  Respiratory: non labored  Heart: tachycardic Continues to have pressor requirement with vasopressin added this am to Jin. Denies abdominal pain. Has recieved 6L bolus since transfer to ICU.     Exam:  ICU Vital Signs Last 24 Hrs  T(C): 36.3, Max: 38.6 (05-23 @ 06:00)  T(F): 97.3, Max: 101.5 (05-23 @ 06:00)  HR: 116 (96 - 132)  BP: 117/68 (39/30 - 117/68)  BP(mean): 77 (30 - 77)  RR: 17 (13 - 29)  SpO2: 96% (90% - 100%)    General: in no distress  Respiratory: non labored  Heart: tachycardic  Abdomen: distended, non tender, colostomy with scant stool, incision with prevena, LUIS M drain with SS output  Neuro: alert and oriented                          14.9   34.1  )-----------( 266      ( 23 May 2017 08:42 )             44.7   05-23    147<H>  |  121<H>  |  48<H>  ----------------------------<  130<H>  4.9   |  14<L>  |  2.88<H>    Ca    5.4<LL>      23 May 2017 08:42  Phos  3.4     05-23  Mg     1.8     05-23    Lactate 2.3  ABG - ( 23 May 2017 06:59 )  pH: 7.19  /  pCO2: 35    /  pO2: 68    / HCO3: 13    / Base Excess: -14   /  SaO2: 89

## 2017-05-23 NOTE — PROGRESS NOTE ADULT - SUBJECTIVE AND OBJECTIVE BOX
CC: SOB and confusion    HPI:  47M admitted for a 24hr h/o progressive abdominal pain that began after a 1-2 day trip to [facility]. The pain is localized to the suprapubic location, associated with bladder pressure and decreased po intake. No nausea, fevers/chills. Cont passing flatus and reports stools are somewhat diarrheal. WBC 18, CT with acute sigmoid diverticulitis, no drainable abscess or fluid collection, though small amounts of free fluid adjacent to appendix. (19 May 2017 18:07)     - Patient seen and examined. Events noted. Patient remains hypotensive on 2-pressors. He is confused at times. Mild SOB with exertion.    PAST MEDICAL & SURGICAL HISTORY:  Fatty liver  No significant past surgical history      FAMILY HISTORY:  No pertinent family history in first degree relatives      Social Hx:    Allergies    No Known Allergies    Intolerances        47y        ICU Vital Signs Last 24 Hrs  T(C): 36.3, Max: 38.6 ( @ 06:00)  T(F): 97.3, Max: 101.5 ( @ 06:00)  HR: 116 (96 - 132)  BP: 117/68 (39/30 - 117/68)  BP(mean): 77 (30 - 77)  ABP: --  ABP(mean): --  RR: 17 (13 - 29)  SpO2: 96% (90% - 100%)          I&O's Summary  I & Os for 24h ending 23 May 2017 07:00  =============================================  IN: 7236.2 ml / OUT: 3124 ml / NET: 4112.2 ml    I & Os for current day (as of 23 May 2017 10:04)  =============================================  IN: 2520.4 ml / OUT: 0 ml / NET: 2520.4 ml                            14.9   34.1  )-----------( 266      ( 23 May 2017 08:42 )             44.7           147<H>  |  121<H>  |  48<H>  ----------------------------<  130<H>  4.9   |  14<L>  |  2.88<H>    Ca    5.4<LL>      23 May 2017 08:42  Phos  3.4     05-23  Mg     1.8     05-23        CAPILLARY BLOOD GLUCOSE  107 (23 May 2017 06:00)  122 (23 May 2017 01:00)  103 (22 May 2017 18:30)  134 (22 May 2017 12:06)                    ABG - ( 23 May 2017 06:59 )  pH: 7.19  /  pCO2: 35    /  pO2: 68    / HCO3: 13    / Base Excess: -14   /  SaO2: 89                  Urinalysis Basic - ( 22 May 2017 15:00 )    Color: Gabi / Appearance: Clear / S.020 / pH: x  Gluc: x / Ketone: Trace  / Bili: Moderate / Urobili: 4 mg/dL   Blood: x / Protein: 30 mg/dL / Nitrite: Positive   Leuk Esterase: Trace / RBC: 25-50 /HPF / WBC 6-10   Sq Epi: x / Non Sq Epi: x / Bacteria: Many        MEDICATIONS  (STANDING):  insulin lispro (HumaLOG) corrective regimen sliding scale  SubCutaneous three times a day before meals  dextrose 5%. 1000milliLiter(s) IV Continuous <Continuous>  dextrose 50% Injectable 12.5Gram(s) IV Push once  dextrose 50% Injectable 25Gram(s) IV Push once  dextrose 50% Injectable 25Gram(s) IV Push once  pantoprazole  Injectable 40milliGRAM(s) IV Push daily  metroNIDAZOLE  IVPB 500milliGRAM(s) IV Intermittent every 8 hours  heparin  Injectable 5000Unit(s) SubCutaneous every 8 hours  ALBUTerol/ipratropium for Nebulization 3milliLiter(s) Nebulizer every 6 hours  piperacillin/tazobactam IVPB. 3.375Gram(s) IV Intermittent every 12 hours  fluconAZOLE IVPB  IV Intermittent   fluconAZOLE IVPB 100milliGRAM(s) IV Intermittent every 24 hours  norepinephrine Infusion 0.01MICROgram(s)/kG/Min IV Continuous <Continuous>  sodium chloride 0.9% Bolus 2000milliLiter(s) IV Bolus once  vasopressin Infusion 0.04Unit(s)/Min IV Continuous <Continuous>  sodium bicarbonate  Infusion 0.234mEq/kG/Hr IV Continuous <Continuous>  calcium chloride Injectable 2milliGRAM(s) IV Push once    MEDICATIONS  (PRN):  naloxone Injectable 0.1milliGRAM(s) IV Push every 3 minutes PRN For ANY of the following changes in patient status:  A. RR LESS THAN 10 breaths per minute, B. Oxygen saturation LESS THAN 90%, C. Sedation score of 6  ondansetron Injectable 4milliGRAM(s) IV Push every 6 hours PRN Nausea  ondansetron Injectable 4milliGRAM(s) IV Push every 6 hours PRN Nausea  dextrose Gel 1Dose(s) Oral once PRN Blood Glucose LESS THAN 70 milliGRAM(s)/deciliter  glucagon  Injectable 1milliGRAM(s) IntraMuscular once PRN Glucose LESS THAN 70 milligrams/deciliter  acetaminophen  Suppository 650milliGRAM(s) Rectal every 6 hours PRN For Temp greater than 38.5 C (101.3 F)  morphine  - Injectable 2milliGRAM(s) IV Push every 4 hours PRN Moderate Pain (4 - 6)          DVT Prophylaxis:    Advanced Directives:  Discussed with:    Visit Information:    45-min CC time Time is exclusive of billed procedures and/or teaching and/or routine family updates.

## 2017-05-23 NOTE — PROGRESS NOTE ADULT - RS GEN PE MLT RESP DETAILS PC
diminished breath sounds, R/diminished breath sounds, L
breath sounds equal/tachypneic/good air movement/airway patent

## 2017-05-23 NOTE — PROGRESS NOTE ADULT - ASSESSMENT
POD 2 s/p sigmoid resection and rutledge's for diverticulitis and ileocolic resection. Still acidotic with base excess at -14 on ABG but lactate improving. Continue supportive cares with pressors and wean as able. Leave phoenix and NG. Remain NPO. Antibiotics per ID. Appreciate ICU management

## 2017-05-23 NOTE — PROGRESS NOTE ADULT - ASSESSMENT
46yo M with:  Septic Shock - On 2-pressors  s/p resection of sigmoid diverticulitis with abscess on 5/21  HARDIK due to ATN  metabolic lactic acidosis    Plan:  Cont ICU Care  Serial Neuro Exams  Septic Shock - On pressors  Keep MAP > 65  High risk for intubation given shock  NPO  Start HCO3 gtt  Monitor renal function  Strict I/O's - Received > 14L of IVF   IV Zosyn / Diflucan per ID  DVT prophylaxis - Hep SQ

## 2017-05-23 NOTE — PROGRESS NOTE ADULT - SUBJECTIVE AND OBJECTIVE BOX
Started on levo gtt and vasopressin gtt this AM, now with HD stability. Producing urine, lactate improving. No reports of abdominal pain, c/o blood draws.    MEDICATIONS  (STANDING):  insulin lispro (HumaLOG) corrective regimen sliding scale  SubCutaneous three times a day before meals  dextrose 5%. 1000milliLiter(s) IV Continuous <Continuous>  dextrose 50% Injectable 12.5Gram(s) IV Push once  dextrose 50% Injectable 25Gram(s) IV Push once  dextrose 50% Injectable 25Gram(s) IV Push once  pantoprazole  Injectable 40milliGRAM(s) IV Push daily  metroNIDAZOLE  IVPB 500milliGRAM(s) IV Intermittent every 8 hours  heparin  Injectable 5000Unit(s) SubCutaneous every 8 hours  ALBUTerol/ipratropium for Nebulization 3milliLiter(s) Nebulizer every 6 hours  piperacillin/tazobactam IVPB. 3.375Gram(s) IV Intermittent every 12 hours  fluconAZOLE IVPB  IV Intermittent   fluconAZOLE IVPB 100milliGRAM(s) IV Intermittent every 24 hours  norepinephrine Infusion 0.01MICROgram(s)/kG/Min IV Continuous <Continuous>  sodium chloride 0.9% Bolus 2000milliLiter(s) IV Bolus once  vasopressin Infusion 0.04Unit(s)/Min IV Continuous <Continuous>  sodium bicarbonate  Infusion 0.234mEq/kG/Hr IV Continuous <Continuous>    MEDICATIONS  (PRN):  naloxone Injectable 0.1milliGRAM(s) IV Push every 3 minutes PRN For ANY of the following changes in patient status:  A. RR LESS THAN 10 breaths per minute, B. Oxygen saturation LESS THAN 90%, C. Sedation score of 6  ondansetron Injectable 4milliGRAM(s) IV Push every 6 hours PRN Nausea  ondansetron Injectable 4milliGRAM(s) IV Push every 6 hours PRN Nausea  dextrose Gel 1Dose(s) Oral once PRN Blood Glucose LESS THAN 70 milliGRAM(s)/deciliter  glucagon  Injectable 1milliGRAM(s) IntraMuscular once PRN Glucose LESS THAN 70 milligrams/deciliter  acetaminophen  Suppository 650milliGRAM(s) Rectal every 6 hours PRN For Temp greater than 38.5 C (101.3 F)  morphine  - Injectable 2milliGRAM(s) IV Push every 4 hours PRN Moderate Pain (4 - 6)    Vital Signs Last 24 Hrs  T(C): 37.4, Max: 38.6 (05-23 @ 06:00)  T(F): 99.4, Max: 101.5 (05-23 @ 06:00)  HR: 97 (93 - 132)  BP: 130/69 (39/30 - 130/69)  BP(mean): 83 (30 - 106)  RR: 33 (13 - 33)  SpO2: 97% (90% - 100%)  I&O's Detail  I & Os for 24h ending 23 May 2017 07:00  =============================================  IN:    sodium chloride 0.9%: 2400 ml    sodium chloride 0.9%: 2000 ml    Sodium Chloride 0.9% IV Bolus: 2000 ml    norepinephrine Infusion: 577 ml    phenylephrine   Infusion: 159.2 ml    IV PiggyBack: 100 ml    Total IN: 7236.2 ml  ---------------------------------------------  OUT:    Nasoenteral Tube: 2450 ml    Indwelling Catheter - Urethral: 424 ml    Bulb: 230 ml    Colostomy: 20 ml    Total OUT: 3124 ml  ---------------------------------------------  Total NET: 4112.2 ml    I & Os for current day (as of 23 May 2017 16:41)  =============================================  IN:    Sodium Chloride 0.9% IV Bolus: 2000 ml    sodium bicarbonate  Infusion: 900 ml    sodium chloride 0.9%: 800 ml    norepinephrine Infusion: 590 ml    IV PiggyBack: 100 ml    vasopressin Infusion: 24 ml    Total IN: 4414 ml  ---------------------------------------------  OUT:    Indwelling Catheter - Urethral: 510 ml    Total OUT: 510 ml  ---------------------------------------------  Total NET: 3904 ml    provena in place, LUIS M with serosanguineous output, colostomy pink and viable, abd wall tight with edema, NT                        14.9   34.1  )-----------( 266      ( 23 May 2017 08:42 )             44.7     05-23    147<H>  |  121<H>  |  48<H>  ----------------------------<  130<H>  4.9   |  14<L>  |  2.88<H>    Ca    5.4<LL>      23 May 2017 08:42  Phos  3.4     05-23  Mg     1.8     05-23       POD 2 Ex-lap, Hartmanns, ileocolostomy for perforated sigmoid diverticulitis with SB phlegmon causing SBO;     PRN morphine for pain.  Levo gtt and vasopressin gtt for HD support. Wean per ICU protocol. Lactate 2 from high of 8.  Cont NPO, NGT for now. Some colostomy output noted.  Oliguria improved, Cr 2.8 from 3.2. Bicarb gtt added.  Hgb 15 down from 19 indicating gradual return of interstitial fluids into vasculature and improving hypovolemic status. On hep SQ for DVT prop.  Tm101.5 this AM, leukocytosis persists, blood cultures ordered. On Zosyn, Flagyl, Diflucan.  Appreciate input from Pulm, ID, renal and medical services.   Cont supportive care for massive postop SIRS response.   Pt's wife called, informed and updated regarding patient condition.

## 2017-05-23 NOTE — PROGRESS NOTE ADULT - SUBJECTIVE AND OBJECTIVE BOX
NEPHROLOGY INTERVAL HPI/OVERNIGHT EVENTS:  46 y/o WM with no significant PMHX. presents after being in [place] for 2 days and noted with suprapubic pain with assoicated poor PO intake.  No N/V/D until yesterday with vomiting.  No NSAID use PTA.   No abx use.      Admitted 3 days ago with Divertricular perforation.  Pt was given abx and IVF.  Pt did not respond to conservative measures and yesterday with rising WBC and Cr, pt  was taken to OR for ileocolic r resection with Eliud's procedure.    Since out of PACU, pt has received 11L of NS or LR./  UOP has remained minimal post-op with sbp in 90s.      Pt alert, awake and in moderate pain.  NG in place.    5/23  Pt transferred to  ICU, w lactate of 8  Fluid resuscitation  given  lactate improving, creat better , has adequate UO  pts wife and sister at bedside, case d/w them and Surgery      PAST MEDICAL & SURGICAL HISTORY:  Fatty liver  No significant past surgical history      FAMILY HISTORY:  No pertinent family history in first degree relatives    MEDS at home  Milk Thistle      MEDICATIONS  (STANDING):  insulin lispro (HumaLOG) corrective regimen sliding scale  SubCutaneous three times a day before meals  dextrose 5%. 1000milliLiter(s) IV Continuous <Continuous>  dextrose 50% Injectable 12.5Gram(s) IV Push once  dextrose 50% Injectable 25Gram(s) IV Push once  dextrose 50% Injectable 25Gram(s) IV Push once  pantoprazole  Injectable 40milliGRAM(s) IV Push daily  metroNIDAZOLE  IVPB 500milliGRAM(s) IV Intermittent every 8 hours  heparin  Injectable 5000Unit(s) SubCutaneous every 8 hours  ALBUTerol/ipratropium for Nebulization 3milliLiter(s) Nebulizer every 6 hours  piperacillin/tazobactam IVPB. 3.375Gram(s) IV Intermittent every 12 hours  fluconAZOLE IVPB  IV Intermittent   fluconAZOLE IVPB 100milliGRAM(s) IV Intermittent every 24 hours  norepinephrine Infusion 0.01MICROgram(s)/kG/Min IV Continuous <Continuous>  sodium chloride 0.9% Bolus 2000milliLiter(s) IV Bolus once  vasopressin Infusion 0.04Unit(s)/Min IV Continuous <Continuous>  sodium bicarbonate  Infusion 0.234mEq/kG/Hr IV Continuous <Continuous>    MEDICATIONS  (PRN):  naloxone Injectable 0.1milliGRAM(s) IV Push every 3 minutes PRN For ANY of the following changes in patient status:  A. RR LESS THAN 10 breaths per minute, B. Oxygen saturation LESS THAN 90%, C. Sedation score of 6  ondansetron Injectable 4milliGRAM(s) IV Push every 6 hours PRN Nausea  ondansetron Injectable 4milliGRAM(s) IV Push every 6 hours PRN Nausea  dextrose Gel 1Dose(s) Oral once PRN Blood Glucose LESS THAN 70 milliGRAM(s)/deciliter  glucagon  Injectable 1milliGRAM(s) IntraMuscular once PRN Glucose LESS THAN 70 milligrams/deciliter  acetaminophen  Suppository 650milliGRAM(s) Rectal every 6 hours PRN For Temp greater than 38.5 C (101.3 F)  morphine  - Injectable 2milliGRAM(s) IV Push every 4 hours PRN Moderate Pain (4 - 6)        Allergies    No Known Allergies    Intolerances        I&O's Summary  I & Os for 24h ending 22 May 2017 07:00  =============================================  IN: 9675 ml / OUT: 685 ml / NET: 8990 ml    I & Os for current day (as of 22 May 2017 13:18)  =============================================  IN: 0 ml / OUT: 290 ml / NET: -290 ml    ICU Vital Signs Last 24 Hrs  T(C): 36.3, Max: 38.6 (05-23 @ 06:00)  T(F): 97.3, Max: 101.5 (05-23 @ 06:00)  HR: 109 (96 - 132)  BP: 119/48 (39/30 - 119/48)  BP(mean): 64 (30 - 103)  ABP: --  ABP(mean): --  RR: 22 (13 - 29)  SpO2: 95% (90% - 100%)    =============================================  IN: 9675 ml / OUT: 685 ml / NET: 8990 ml    I & Os for current day (as of 22 May 2017 13:18)  =============================================  IN: 0 ml / OUT: 290 ml / NET: -290 ml      PHYSICAL EXAM:  GEN: alert awake O X 3  HEENT: MMM, NG tub in place   NECK supple no jvd  CV: RRR s1s2  LUNGS: b/l CTA  ABD: hypoactive BS with mild distension ,   EXT: no edema, scd in place,     LABS:  ICU Vital Signs Last 24 Hrs  T(C): 36.3, Max: 38.6 (05-23 @ 06:00)  T(F): 97.3, Max: 101.5 (05-23 @ 06:00)  HR: 109 (96 - 132)  BP: 119/48 (39/30 - 119/48)  BP(mean): 64 (30 - 103)  ABP: --  ABP(mean): --  RR: 22 (13 - 29)  SpO2: 95% (90% - 100%)                                      14.9   34.1  )-----------( 266      ( 23 May 2017 08:42 )             44.7       05-23    147<H>  |  121<H>  |  48<H>  ----------------------------<  130<H>  4.9   |  14<L>  |  2.88<H>    Ca    5.4<LL>      23 May 2017 08:42  Phos  3.4     05-23  Mg     1.8     05-23        ABG - ( 23 May 2017 06:59 )  pH: 7.19  /  pCO2: 35    /  pO2: 68    / HCO3: 13    / Base Excess: -14   /  SaO2: 89

## 2017-05-23 NOTE — PROGRESS NOTE ADULT - SUBJECTIVE AND OBJECTIVE BOX
Subjective:    All events noted pat lactate 8, on two pressors, levophed & vasopressin , in ICU.    MEDICATIONS  (STANDING):  insulin lispro (HumaLOG) corrective regimen sliding scale  SubCutaneous three times a day before meals  dextrose 5%. 1000milliLiter(s) IV Continuous <Continuous>  dextrose 50% Injectable 12.5Gram(s) IV Push once  dextrose 50% Injectable 25Gram(s) IV Push once  dextrose 50% Injectable 25Gram(s) IV Push once  sodium chloride 0.9%. 1000milliLiter(s) IV Continuous <Continuous>  pantoprazole  Injectable 40milliGRAM(s) IV Push daily  metroNIDAZOLE  IVPB 500milliGRAM(s) IV Intermittent every 8 hours  heparin  Injectable 5000Unit(s) SubCutaneous every 8 hours  ALBUTerol/ipratropium for Nebulization 3milliLiter(s) Nebulizer every 6 hours  piperacillin/tazobactam IVPB. 3.375Gram(s) IV Intermittent every 12 hours  fluconAZOLE IVPB  IV Intermittent   fluconAZOLE IVPB 100milliGRAM(s) IV Intermittent every 24 hours  norepinephrine Infusion 0.01MICROgram(s)/kG/Min IV Continuous <Continuous>  sodium chloride 0.9% Bolus 2000milliLiter(s) IV Bolus once  vasopressin Infusion 0.04Unit(s)/Min IV Continuous <Continuous>    MEDICATIONS  (PRN):  naloxone Injectable 0.1milliGRAM(s) IV Push every 3 minutes PRN For ANY of the following changes in patient status:  A. RR LESS THAN 10 breaths per minute, B. Oxygen saturation LESS THAN 90%, C. Sedation score of 6  ondansetron Injectable 4milliGRAM(s) IV Push every 6 hours PRN Nausea  ondansetron Injectable 4milliGRAM(s) IV Push every 6 hours PRN Nausea  dextrose Gel 1Dose(s) Oral once PRN Blood Glucose LESS THAN 70 milliGRAM(s)/deciliter  glucagon  Injectable 1milliGRAM(s) IntraMuscular once PRN Glucose LESS THAN 70 milligrams/deciliter  acetaminophen  Suppository 650milliGRAM(s) Rectal every 6 hours PRN For Temp greater than 38.5 C (101.3 F)      Allergies    No Known Allergies    Intolerances        Vital Signs Last 24 Hrs  T(C): 36.3, Max: 38.6 (05-23 @ 06:00)  T(F): 97.3, Max: 101.5 (23 @ 06:00)  HR: 116 (96 - 132)  BP: 117/68 (39/30 - 117/68)  BP(mean): 77 (30 - 77)  RR: 17 (13 - 29)  SpO2: 96% (90% - 100%)    PHYSICAL EXAMINATION:    NECK:  Supple. No lymphadenopathy. Jugular venous pressure not elevated. Carotids equal.   HEART:   The cardiac impulse has a normal quality. Reg., Nl S1 and S2.  There are no murmurs, rubs or gallops noted  CHEST:  Chest is clear to auscultation. Normal respiratory effort.  ABDOMEN:  Soft and nontender.   EXTREMITIES:  There is no edema.       LABS:                        14.9   34.1  )-----------( 266      ( 23 May 2017 08:42 )             44.7     05-    147<H>  |  121<H>  |  48<H>  ----------------------------<  130<H>  4.9   |  14<L>  |  2.88<H>    Ca    5.4<LL>      23 May 2017 08:42  Phos  3.4     -  Mg     1.8             Urinalysis Basic - ( 22 May 2017 15:00 )    Color: Gabi / Appearance: Clear / S.020 / pH: x  Gluc: x / Ketone: Trace  / Bili: Moderate / Urobili: 4 mg/dL   Blood: x / Protein: 30 mg/dL / Nitrite: Positive   Leuk Esterase: Trace / RBC: 25-50 /HPF / WBC 6-10   Sq Epi: x / Non Sq Epi: x / Bacteria: Many

## 2017-05-23 NOTE — PROGRESS NOTE ADULT - ASSESSMENT
PROBLEMS:    Diverticulitis of intestine with perforation S/P LAP  septic shock  DYSNEA  HYPOXAMIA  ATELECTASIS  COPD    PLAN;    iv abx-zosyn/flagyl/diflucan  titrate pressors  AEROSOLS  FIO2  SUPPORTIVE CARE  DVT PROPHYLASIX

## 2017-05-23 NOTE — PROGRESS NOTE ADULT - SUBJECTIVE AND OBJECTIVE BOX
HPI:  47M with no significant past medical history admitted on 5/19 for evaluation of lower abdominal pain, decreased appetite and upon admission was found to have sigmoid diverticulitis; patient was medically managed however, on 5/21 patient underwent sigmoid resection, ileocolostomy and currently is post op asking for ice chips. He has decreased urine output and worsening renal function as well as hypotension.  Today 5/23 patient is on pressor support, intermittently febrile      MEDICATIONS  (STANDING):  insulin lispro (HumaLOG) corrective regimen sliding scale  SubCutaneous three times a day before meals  dextrose 5%. 1000milliLiter(s) IV Continuous <Continuous>  dextrose 50% Injectable 12.5Gram(s) IV Push once  dextrose 50% Injectable 25Gram(s) IV Push once  dextrose 50% Injectable 25Gram(s) IV Push once  pantoprazole  Injectable 40milliGRAM(s) IV Push daily  metroNIDAZOLE  IVPB 500milliGRAM(s) IV Intermittent every 8 hours  heparin  Injectable 5000Unit(s) SubCutaneous every 8 hours  ALBUTerol/ipratropium for Nebulization 3milliLiter(s) Nebulizer every 6 hours  piperacillin/tazobactam IVPB. 3.375Gram(s) IV Intermittent every 12 hours  fluconAZOLE IVPB  IV Intermittent   fluconAZOLE IVPB 100milliGRAM(s) IV Intermittent every 24 hours  norepinephrine Infusion 0.01MICROgram(s)/kG/Min IV Continuous <Continuous>  sodium chloride 0.9% Bolus 2000milliLiter(s) IV Bolus once  vasopressin Infusion 0.04Unit(s)/Min IV Continuous <Continuous>  sodium bicarbonate  Infusion 0.234mEq/kG/Hr IV Continuous <Continuous>  calcium gluconate IVPB 2Gram(s) IV Intermittent once    MEDICATIONS  (PRN):  naloxone Injectable 0.1milliGRAM(s) IV Push every 3 minutes PRN For ANY of the following changes in patient status:  A. RR LESS THAN 10 breaths per minute, B. Oxygen saturation LESS THAN 90%, C. Sedation score of 6  ondansetron Injectable 4milliGRAM(s) IV Push every 6 hours PRN Nausea  ondansetron Injectable 4milliGRAM(s) IV Push every 6 hours PRN Nausea  dextrose Gel 1Dose(s) Oral once PRN Blood Glucose LESS THAN 70 milliGRAM(s)/deciliter  glucagon  Injectable 1milliGRAM(s) IntraMuscular once PRN Glucose LESS THAN 70 milligrams/deciliter  acetaminophen  Suppository 650milliGRAM(s) Rectal every 6 hours PRN For Temp greater than 38.5 C (101.3 F)  morphine  - Injectable 2milliGRAM(s) IV Push every 4 hours PRN Moderate Pain (4 - 6)      Vital Signs Last 24 Hrs  T(C): 36.3, Max: 38.6 (05-23 @ 06:00)  T(F): 97.3, Max: 101.5 (05-23 @ 06:00)  HR: 116 (96 - 132)  BP: 117/68 (39/30 - 117/68)  BP(mean): 77 (30 - 77)  RR: 17 (13 - 29)  SpO2: 96% (90% - 100%)    Physical Exam:          Constitutional: frail looking  HEENT: NC/AT, EOMI, PERRLA, ngt in place  Neck: supple  Respiratory: clear  Cardiovascular: S1S2 regular, no murmurs  Abdomen: kimmy drain in place, wound with wound vac, left sided ostomy  Genitourinary: deferred  Rectal: deferred  Musculoskeletal: no muscle tenderness, no joint swelling or tenderness  Neurological: AxOx3, moving all extremities, no focal deficits  Skin: no rashes, mild mottling of lower extremities             Labs:                        14.9   34.1  )-----------( 266      ( 23 May 2017 08:42 )             44.7     05-23    147<H>  |  121<H>  |  48<H>  ----------------------------<  130<H>  4.9   |  14<L>  |  2.88<H>    Ca    5.4<LL>      23 May 2017 08:42  Phos  3.4     05-23  Mg     1.8     05-23             Cultures:              Radiology:EXAM:  CT ABDOMEN AND PELVIS IC                            PROCEDURE DATE:  05/19/2017        INTERPRETATION:  CLINICAL INFORMATION: 47-year-old man with abdominal   pain assess for appendicitis     TECHNIQUE:    CT of abdomen and pelvis was performed with axial images   were obtained from the diaphragm to pubic symphysis oral and IV contrast.    COMPARISON:  None.    FINDINGS:    Liver: Borderline hepatomegaly with mild fatty infiltration otherwise   within normal limits  Gallbladder: Within normal limits  Bile ducts: No intrahepatic or extrahepatic biliary dilatation  Pancreas: within normal limits    Spleen: within normal limits  Adrenal: within normal limits  Kidneys, Ureters and Bladder: Symmetric enhancement bilaterally. No   perinephric attending or collections. No hydronephrosis. No intrarenal   calculi. Normal caliber of the ureters. Limited unopacified nondistended   bladder.    Pelvis: No pelvic adenopathy or pelvic free fluid.  Small fat-containing   bilateral inguinal hernias.      Bowel: No bowel obstruction.  There are few scattered colonic   diverticula. There is focal thickening of sigmoid colon in the pelvis   associated with mesenteric fat stranding and thickening of adjacent   fascial planes with localized perforation and small air bubbles without   abscess. Findings are consistent with acute rupture diverticulitis. Small   free fluid adjacent to gas filled appendix.    Peritoneum: Small ascites, no organized fluid collections.    Retroperitoneum: within normal limits  Vessels: Patent.    Abdominal wall: Small fat-containing umbilical hernia.    Lower chest and lung Bases: The visualized lung bases clear except for   subsegmental dependent atelectasis. Heart normal in size.   Bones: Degenerative changes.     IMPRESSION:     Focal thickening of sigmoid colon with mesenteric fat stranding and   thickening of fascial planes with scattered adjacent air bubbles   consistent with acute diverticulitis without abscess. Small free fluid in   the abdomen extending to the right quadrant.        Advanced directive addressed: full resuscitation

## 2017-05-24 LAB
ANION GAP SERPL CALC-SCNC: 4 MMOL/L — LOW (ref 5–17)
ANION GAP SERPL CALC-SCNC: 9 MMOL/L — SIGNIFICANT CHANGE UP (ref 5–17)
BASE EXCESS BLDV CALC-SCNC: 0.8 MMOL/L — SIGNIFICANT CHANGE UP (ref -2–2)
BUN SERPL-MCNC: 33 MG/DL — HIGH (ref 7–23)
BUN SERPL-MCNC: 37 MG/DL — HIGH (ref 7–23)
CALCIUM SERPL-MCNC: 6.1 MG/DL — CRITICAL LOW (ref 8.5–10.1)
CALCIUM SERPL-MCNC: 6.3 MG/DL — CRITICAL LOW (ref 8.5–10.1)
CALCIUM SERPL-MCNC: 6.6 MG/DL — LOW (ref 8.5–10.1)
CHLORIDE SERPL-SCNC: 113 MMOL/L — HIGH (ref 96–108)
CHLORIDE SERPL-SCNC: 114 MMOL/L — HIGH (ref 96–108)
CO2 SERPL-SCNC: 24 MMOL/L — SIGNIFICANT CHANGE UP (ref 22–31)
CO2 SERPL-SCNC: 24 MMOL/L — SIGNIFICANT CHANGE UP (ref 22–31)
CREAT SERPL-MCNC: 1.47 MG/DL — HIGH (ref 0.5–1.3)
CREAT SERPL-MCNC: 1.81 MG/DL — HIGH (ref 0.5–1.3)
GLUCOSE SERPL-MCNC: 167 MG/DL — HIGH (ref 70–99)
GLUCOSE SERPL-MCNC: 168 MG/DL — HIGH (ref 70–99)
GRAM STN FLD: SIGNIFICANT CHANGE UP
HCO3 BLDV-SCNC: 24 MMOL/L — SIGNIFICANT CHANGE UP (ref 21–29)
HCT VFR BLD CALC: 38.5 % — LOW (ref 39–50)
HGB BLD-MCNC: 13.5 G/DL — SIGNIFICANT CHANGE UP (ref 13–17)
MAGNESIUM SERPL-MCNC: 2 MG/DL — SIGNIFICANT CHANGE UP (ref 1.6–2.6)
MCHC RBC-ENTMCNC: 30.6 PG — SIGNIFICANT CHANGE UP (ref 27–34)
MCHC RBC-ENTMCNC: 35.1 GM/DL — SIGNIFICANT CHANGE UP (ref 32–36)
MCV RBC AUTO: 87.3 FL — SIGNIFICANT CHANGE UP (ref 80–100)
PCO2 BLDV: 36 MMHG — SIGNIFICANT CHANGE UP (ref 35–50)
PH BLDV: 7.44 — SIGNIFICANT CHANGE UP (ref 7.35–7.45)
PHOSPHATE SERPL-MCNC: 1.5 MG/DL — LOW (ref 2.5–4.5)
PLATELET # BLD AUTO: 176 K/UL — SIGNIFICANT CHANGE UP (ref 150–400)
PO2 BLDV: 36 MMHG — SIGNIFICANT CHANGE UP (ref 25–45)
POTASSIUM SERPL-MCNC: 3.5 MMOL/L — SIGNIFICANT CHANGE UP (ref 3.5–5.3)
POTASSIUM SERPL-MCNC: 3.7 MMOL/L — SIGNIFICANT CHANGE UP (ref 3.5–5.3)
POTASSIUM SERPL-SCNC: 3.5 MMOL/L — SIGNIFICANT CHANGE UP (ref 3.5–5.3)
POTASSIUM SERPL-SCNC: 3.7 MMOL/L — SIGNIFICANT CHANGE UP (ref 3.5–5.3)
RBC # BLD: 4.41 M/UL — SIGNIFICANT CHANGE UP (ref 4.2–5.8)
RBC # FLD: 12 % — SIGNIFICANT CHANGE UP (ref 10.3–14.5)
SAO2 % BLDV: 68 % — SIGNIFICANT CHANGE UP (ref 67–88)
SODIUM SERPL-SCNC: 142 MMOL/L — SIGNIFICANT CHANGE UP (ref 135–145)
SODIUM SERPL-SCNC: 146 MMOL/L — HIGH (ref 135–145)
SURGICAL PATHOLOGY FINAL REPORT - CH: SIGNIFICANT CHANGE UP
WBC # BLD: 24.4 K/UL — HIGH (ref 3.8–10.5)
WBC # FLD AUTO: 24.4 K/UL — HIGH (ref 3.8–10.5)

## 2017-05-24 PROCEDURE — 71010: CPT | Mod: 26

## 2017-05-24 RX ORDER — CALCIUM GLUCONATE 100 MG/ML
1 VIAL (ML) INTRAVENOUS ONCE
Qty: 0 | Refills: 0 | Status: COMPLETED | OUTPATIENT
Start: 2017-05-24 | End: 2017-05-24

## 2017-05-24 RX ORDER — SODIUM CHLORIDE 9 MG/ML
1000 INJECTION, SOLUTION INTRAVENOUS
Qty: 0 | Refills: 0 | Status: DISCONTINUED | OUTPATIENT
Start: 2017-05-24 | End: 2017-05-25

## 2017-05-24 RX ORDER — SODIUM CHLORIDE 9 MG/ML
1000 INJECTION, SOLUTION INTRAVENOUS
Qty: 0 | Refills: 0 | Status: DISCONTINUED | OUTPATIENT
Start: 2017-05-24 | End: 2017-05-24

## 2017-05-24 RX ORDER — INSULIN LISPRO 100/ML
VIAL (ML) SUBCUTANEOUS AT BEDTIME
Qty: 0 | Refills: 0 | Status: DISCONTINUED | OUTPATIENT
Start: 2017-05-24 | End: 2017-05-24

## 2017-05-24 RX ORDER — POTASSIUM PHOSPHATE, MONOBASIC POTASSIUM PHOSPHATE, DIBASIC 236; 224 MG/ML; MG/ML
15 INJECTION, SOLUTION INTRAVENOUS ONCE
Qty: 0 | Refills: 0 | Status: COMPLETED | OUTPATIENT
Start: 2017-05-24 | End: 2017-05-24

## 2017-05-24 RX ORDER — PIPERACILLIN AND TAZOBACTAM 4; .5 G/20ML; G/20ML
3.38 INJECTION, POWDER, LYOPHILIZED, FOR SOLUTION INTRAVENOUS EVERY 8 HOURS
Qty: 0 | Refills: 0 | Status: DISCONTINUED | OUTPATIENT
Start: 2017-05-24 | End: 2017-06-01

## 2017-05-24 RX ORDER — INSULIN LISPRO 100/ML
VIAL (ML) SUBCUTANEOUS
Qty: 0 | Refills: 0 | Status: DISCONTINUED | OUTPATIENT
Start: 2017-05-24 | End: 2017-05-24

## 2017-05-24 RX ORDER — DEXTROSE 50 % IN WATER 50 %
1 SYRINGE (ML) INTRAVENOUS ONCE
Qty: 0 | Refills: 0 | Status: DISCONTINUED | OUTPATIENT
Start: 2017-05-24 | End: 2017-05-24

## 2017-05-24 RX ORDER — CALCIUM GLUCONATE 100 MG/ML
2 VIAL (ML) INTRAVENOUS ONCE
Qty: 0 | Refills: 0 | Status: COMPLETED | OUTPATIENT
Start: 2017-05-24 | End: 2017-05-24

## 2017-05-24 RX ORDER — FUROSEMIDE 40 MG
40 TABLET ORAL ONCE
Qty: 0 | Refills: 0 | Status: COMPLETED | OUTPATIENT
Start: 2017-05-24 | End: 2017-05-24

## 2017-05-24 RX ADMIN — Medication 1 MILLIGRAM(S): at 01:04

## 2017-05-24 RX ADMIN — Medication 100 MILLIGRAM(S): at 22:22

## 2017-05-24 RX ADMIN — Medication 3 MILLILITER(S): at 02:49

## 2017-05-24 RX ADMIN — PANTOPRAZOLE SODIUM 40 MILLIGRAM(S): 20 TABLET, DELAYED RELEASE ORAL at 12:03

## 2017-05-24 RX ADMIN — SODIUM CHLORIDE 75 MILLILITER(S): 9 INJECTION, SOLUTION INTRAVENOUS at 23:39

## 2017-05-24 RX ADMIN — Medication 100 MILLIGRAM(S): at 05:51

## 2017-05-24 RX ADMIN — Medication 150 MEQ/KG/HR: at 02:43

## 2017-05-24 RX ADMIN — Medication 3 MILLILITER(S): at 20:43

## 2017-05-24 RX ADMIN — PIPERACILLIN AND TAZOBACTAM 25 GRAM(S): 4; .5 INJECTION, POWDER, LYOPHILIZED, FOR SOLUTION INTRAVENOUS at 22:22

## 2017-05-24 RX ADMIN — PIPERACILLIN AND TAZOBACTAM 25 GRAM(S): 4; .5 INJECTION, POWDER, LYOPHILIZED, FOR SOLUTION INTRAVENOUS at 13:32

## 2017-05-24 RX ADMIN — Medication 1.8 MICROGRAM(S)/KG/MIN: at 12:04

## 2017-05-24 RX ADMIN — Medication 1.8 MICROGRAM(S)/KG/MIN: at 03:24

## 2017-05-24 RX ADMIN — Medication 200 GRAM(S): at 01:04

## 2017-05-24 RX ADMIN — FLUCONAZOLE 50 MILLIGRAM(S): 150 TABLET ORAL at 13:40

## 2017-05-24 RX ADMIN — Medication 200 GRAM(S): at 10:24

## 2017-05-24 RX ADMIN — HEPARIN SODIUM 5000 UNIT(S): 5000 INJECTION INTRAVENOUS; SUBCUTANEOUS at 05:51

## 2017-05-24 RX ADMIN — SODIUM CHLORIDE 75 MILLILITER(S): 9 INJECTION, SOLUTION INTRAVENOUS at 09:35

## 2017-05-24 RX ADMIN — PIPERACILLIN AND TAZOBACTAM 25 GRAM(S): 4; .5 INJECTION, POWDER, LYOPHILIZED, FOR SOLUTION INTRAVENOUS at 05:51

## 2017-05-24 RX ADMIN — Medication 40 MILLIGRAM(S): at 13:24

## 2017-05-24 RX ADMIN — HEPARIN SODIUM 5000 UNIT(S): 5000 INJECTION INTRAVENOUS; SUBCUTANEOUS at 13:25

## 2017-05-24 RX ADMIN — POTASSIUM PHOSPHATE, MONOBASIC POTASSIUM PHOSPHATE, DIBASIC 62.5 MILLIMOLE(S): 236; 224 INJECTION, SOLUTION INTRAVENOUS at 09:26

## 2017-05-24 RX ADMIN — Medication 100 MILLIGRAM(S): at 13:24

## 2017-05-24 RX ADMIN — VASOPRESSIN 2.4 UNIT(S)/MIN: 20 INJECTION INTRAVENOUS at 23:39

## 2017-05-24 RX ADMIN — Medication 200 GRAM(S): at 21:18

## 2017-05-24 NOTE — PROGRESS NOTE ADULT - ASSESSMENT
POD 3 s/p sigmoid resection and rutledge's for diverticulitis and ileocolic resection. Overall improving sepsis although still requiring pressor support. Lactate normalized and renal function improving. Continue supportive cares for now. If continues to improve, will anticipate removal on NG in the next 48 hours. Hold off TPN today. Wean pressors as able. Antibiotics per ID. Appreciate ICU management

## 2017-05-24 NOTE — PROGRESS NOTE ADULT - SUBJECTIVE AND OBJECTIVE BOX
Subjective:      MEDICATIONS  (STANDING):  pantoprazole  Injectable 40milliGRAM(s) IV Push daily  metroNIDAZOLE  IVPB 500milliGRAM(s) IV Intermittent every 8 hours  heparin  Injectable 5000Unit(s) SubCutaneous every 8 hours  ALBUTerol/ipratropium for Nebulization 3milliLiter(s) Nebulizer every 6 hours  fluconAZOLE IVPB  IV Intermittent   fluconAZOLE IVPB 100milliGRAM(s) IV Intermittent every 24 hours  norepinephrine Infusion 0.01MICROgram(s)/kG/Min IV Continuous <Continuous>  vasopressin Infusion 0.04Unit(s)/Min IV Continuous <Continuous>  sodium bicarbonate  Infusion 0.234mEq/kG/Hr IV Continuous <Continuous>  sodium chloride 0.45%. 1000milliLiter(s) IV Continuous <Continuous>  piperacillin/tazobactam IVPB. 3.375Gram(s) IV Intermittent every 8 hours    MEDICATIONS  (PRN):  naloxone Injectable 0.1milliGRAM(s) IV Push every 3 minutes PRN For ANY of the following changes in patient status:  A. RR LESS THAN 10 breaths per minute, B. Oxygen saturation LESS THAN 90%, C. Sedation score of 6  ondansetron Injectable 4milliGRAM(s) IV Push every 6 hours PRN Nausea  ondansetron Injectable 4milliGRAM(s) IV Push every 6 hours PRN Nausea  acetaminophen  Suppository 650milliGRAM(s) Rectal every 6 hours PRN For Temp greater than 38.5 C (101.3 F)  morphine  - Injectable 2milliGRAM(s) IV Push every 4 hours PRN Moderate Pain (4 - 6)  LORazepam   Injectable 1milliGRAM(s) IntraMuscular every 6 hours PRN Anxiety      Allergies    No Known Allergies    Intolerances        Vital Signs Last 24 Hrs  T(C): 36.8, Max: 37.7 (05-24 @ 02:00)  T(F): 98.3, Max: 99.8 (05-24 @ 02:00)  HR: 58 (58 - 116)  BP: 122/66 (103/80 - 133/52)  BP(mean): 79 (44 - 106)  RR: 19 (0 - 33)  SpO2: 99% (80% - 100%)    PHYSICAL EXAMINATION:    NECK:  Supple. No lymphadenopathy. Jugular venous pressure not elevated. Carotids equal.   HEART:   The cardiac impulse has a normal quality. Reg., Nl S1 and S2.  There are no murmurs, rubs or gallops noted  CHEST:  Chest is clear to auscultation. Normal respiratory effort.  ABDOMEN:  Soft and nontender.   EXTREMITIES:  There is no edema.       LABS:                        13.5   24.4  )-----------( 176      ( 24 May 2017 05:45 )             38.5     05-24    146<H>  |  113<H>  |  33<H>  ----------------------------<  167<H>  3.5   |  24  |  1.47<H>    Ca    6.3<LL>      24 May 2017 05:45  Phos  1.5     -24  Mg     2.0     05-24        Urinalysis Basic - ( 22 May 2017 15:00 )    Color: Gabi / Appearance: Clear / S.020 / pH: x  Gluc: x / Ketone: Trace  / Bili: Moderate / Urobili: 4 mg/dL   Blood: x / Protein: 30 mg/dL / Nitrite: Positive   Leuk Esterase: Trace / RBC: 25-50 /HPF / WBC 6-10   Sq Epi: x / Non Sq Epi: x / Bacteria: Many Subjective:    pat still on two pressors,no respiratory distress, some chest discomfort.    MEDICATIONS  (STANDING):  pantoprazole  Injectable 40milliGRAM(s) IV Push daily  metroNIDAZOLE  IVPB 500milliGRAM(s) IV Intermittent every 8 hours  heparin  Injectable 5000Unit(s) SubCutaneous every 8 hours  ALBUTerol/ipratropium for Nebulization 3milliLiter(s) Nebulizer every 6 hours  fluconAZOLE IVPB  IV Intermittent   fluconAZOLE IVPB 100milliGRAM(s) IV Intermittent every 24 hours  norepinephrine Infusion 0.01MICROgram(s)/kG/Min IV Continuous <Continuous>  vasopressin Infusion 0.04Unit(s)/Min IV Continuous <Continuous>  sodium bicarbonate  Infusion 0.234mEq/kG/Hr IV Continuous <Continuous>  sodium chloride 0.45%. 1000milliLiter(s) IV Continuous <Continuous>  piperacillin/tazobactam IVPB. 3.375Gram(s) IV Intermittent every 8 hours    MEDICATIONS  (PRN):  naloxone Injectable 0.1milliGRAM(s) IV Push every 3 minutes PRN For ANY of the following changes in patient status:  A. RR LESS THAN 10 breaths per minute, B. Oxygen saturation LESS THAN 90%, C. Sedation score of 6  ondansetron Injectable 4milliGRAM(s) IV Push every 6 hours PRN Nausea  ondansetron Injectable 4milliGRAM(s) IV Push every 6 hours PRN Nausea  acetaminophen  Suppository 650milliGRAM(s) Rectal every 6 hours PRN For Temp greater than 38.5 C (101.3 F)  morphine  - Injectable 2milliGRAM(s) IV Push every 4 hours PRN Moderate Pain (4 - 6)  LORazepam   Injectable 1milliGRAM(s) IntraMuscular every 6 hours PRN Anxiety      Allergies    No Known Allergies    Intolerances        Vital Signs Last 24 Hrs  T(C): 36.8, Max: 37.7 (05-24 @ 02:00)  T(F): 98.3, Max: 99.8 (05-24 @ 02:00)  HR: 58 (58 - 116)  BP: 122/66 (103/80 - 133/52)  BP(mean): 79 (44 - 106)  RR: 19 (0 - 33)  SpO2: 99% (80% - 100%)    PHYSICAL EXAMINATION:    NECK:  Supple. No lymphadenopathy. Jugular venous pressure not elevated. Carotids equal.   HEART:   The cardiac impulse has a normal quality. Reg., Nl S1 and S2.  There are no murmurs, rubs or gallops noted  CHEST:  Chest is clear to auscultation. Normal respiratory effort.  ABDOMEN:  Soft and nontender.   EXTREMITIES:  There is no edema.       LABS:                        13.5   24.4  )-----------( 176      ( 24 May 2017 05:45 )             38.5     05-24    146<H>  |  113<H>  |  33<H>  ----------------------------<  167<H>  3.5   |  24  |  1.47<H>    Ca    6.3<LL>      24 May 2017 05:45  Phos  1.5     -  Mg     2.0     -24        Urinalysis Basic - ( 22 May 2017 15:00 )    Color: Gabi / Appearance: Clear / S.020 / pH: x  Gluc: x / Ketone: Trace  / Bili: Moderate / Urobili: 4 mg/dL   Blood: x / Protein: 30 mg/dL / Nitrite: Positive   Leuk Esterase: Trace / RBC: 25-50 /HPF / WBC 6-10   Sq Epi: x / Non Sq Epi: x / Bacteria: Many

## 2017-05-24 NOTE — PROVIDER CONTACT NOTE (CRITICAL VALUE NOTIFICATION) - PERSON GIVING RESULT:
Evie Feliciano
[facility]
SHADE Ramos, Labratory
lab
lab [facility]
zunilda/ lab
LAB

## 2017-05-24 NOTE — PROVIDER CONTACT NOTE (CRITICAL VALUE NOTIFICATION) - TEST AND RESULT REPORTED:
Hgb 19.6  Hct 58
positive blood cx in anaerobic prelim bottle - gram positive cocci in clusters
CA 5.4
Hemoglobin 18.9  Hematocrit 57.1  WBC 31.8
Hg/Hct
Lactate 4.0
blood calcium
Ca 6.3

## 2017-05-24 NOTE — PROGRESS NOTE ADULT - SUBJECTIVE AND OBJECTIVE BOX
CC: SOB    HPI:  47M admitted for a 24hr h/o progressive abdominal pain that began after a 1-2 day trip to [facility]. The pain is localized to the suprapubic location, associated with bladder pressure and decreased po intake. No nausea, fevers/chills. Cont passing flatus and reports stools are somewhat diarrheal. WBC 18, CT with acute sigmoid diverticulitis, no drainable abscess or fluid collection, though small amounts of free fluid adjacent to appendix. (19 May 2017 18:07)     - Events noted. Remains on 2-pressors for BP support. Episode of SOB overnight requiring Lasix with significant improvement.       PAST MEDICAL & SURGICAL HISTORY:  Fatty liver  No significant past surgical history      FAMILY HISTORY:  No pertinent family history in first degree relatives      Social Hx:    Allergies    No Known Allergies    Intolerances        47y        ICU Vital Signs Last 24 Hrs  T(C): 36.8, Max: 37.7 ( @ 02:00)  T(F): 98.3, Max: 99.8 ( @ 02:00)  HR: 64 (63 - 116)  BP: 126/62 (103/80 - 133/52)  BP(mean): 78 (44 - 106)  ABP: --  ABP(mean): --  RR: 8 (0 - 33)  SpO2: 100% (80% - 100%)          I&O's Summary  I & Os for 24h ending 24 May 2017 07:00  =============================================  IN: 8272 ml / OUT: 4390 ml / NET: 3882 ml    I & Os for current day (as of 24 May 2017 12:17)  =============================================  IN: 683.2 ml / OUT: 275 ml / NET: 408.2 ml                            13.5   24.4  )-----------( 176      ( 24 May 2017 05:45 )             38.5       05-24    146<H>  |  113<H>  |  33<H>  ----------------------------<  167<H>  3.5   |  24  |  1.47<H>    Ca    6.3<LL>      24 May 2017 05:45  Phos  1.5     05-24  Mg     2.0     05-24        CAPILLARY BLOOD GLUCOSE  150 (24 May 2017 06:00)                    ABG - ( 23 May 2017 06:59 )  pH: 7.19  /  pCO2: 35    /  pO2: 68    / HCO3: 13    / Base Excess: -14   /  SaO2: 89                  Urinalysis Basic - ( 22 May 2017 15:00 )    Color: Gabi / Appearance: Clear / S.020 / pH: x  Gluc: x / Ketone: Trace  / Bili: Moderate / Urobili: 4 mg/dL   Blood: x / Protein: 30 mg/dL / Nitrite: Positive   Leuk Esterase: Trace / RBC: 25-50 /HPF / WBC 6-10   Sq Epi: x / Non Sq Epi: x / Bacteria: Many        MEDICATIONS  (STANDING):  pantoprazole  Injectable 40milliGRAM(s) IV Push daily  metroNIDAZOLE  IVPB 500milliGRAM(s) IV Intermittent every 8 hours  heparin  Injectable 5000Unit(s) SubCutaneous every 8 hours  ALBUTerol/ipratropium for Nebulization 3milliLiter(s) Nebulizer every 6 hours  fluconAZOLE IVPB  IV Intermittent   fluconAZOLE IVPB 100milliGRAM(s) IV Intermittent every 24 hours  norepinephrine Infusion 0.01MICROgram(s)/kG/Min IV Continuous <Continuous>  vasopressin Infusion 0.04Unit(s)/Min IV Continuous <Continuous>  sodium bicarbonate  Infusion 0.234mEq/kG/Hr IV Continuous <Continuous>  sodium chloride 0.45%. 1000milliLiter(s) IV Continuous <Continuous>  piperacillin/tazobactam IVPB. 3.375Gram(s) IV Intermittent every 8 hours    MEDICATIONS  (PRN):  naloxone Injectable 0.1milliGRAM(s) IV Push every 3 minutes PRN For ANY of the following changes in patient status:  A. RR LESS THAN 10 breaths per minute, B. Oxygen saturation LESS THAN 90%, C. Sedation score of 6  ondansetron Injectable 4milliGRAM(s) IV Push every 6 hours PRN Nausea  ondansetron Injectable 4milliGRAM(s) IV Push every 6 hours PRN Nausea  acetaminophen  Suppository 650milliGRAM(s) Rectal every 6 hours PRN For Temp greater than 38.5 C (101.3 F)  morphine  - Injectable 2milliGRAM(s) IV Push every 4 hours PRN Moderate Pain (4 - 6)  LORazepam   Injectable 1milliGRAM(s) IntraMuscular every 6 hours PRN Anxiety          DVT Prophylaxis: Hep SQ    Advanced Directives:  Discussed with:    Visit Information:    45-min CC Time is exclusive of billed procedures and/or teaching and/or routine family updates.

## 2017-05-24 NOTE — PROGRESS NOTE ADULT - SUBJECTIVE AND OBJECTIVE BOX
Agitate and restless overnight. Pulled NGT, vomited, resulting in NGT replacement. Remains on levo gtt and vasopressin gtt. UOP normalizing.    MEDICATIONS  (STANDING):  dextrose 5%. 1000milliLiter(s) IV Continuous <Continuous>  dextrose 50% Injectable 12.5Gram(s) IV Push once  dextrose 50% Injectable 25Gram(s) IV Push once  dextrose 50% Injectable 25Gram(s) IV Push once  pantoprazole  Injectable 40milliGRAM(s) IV Push daily  metroNIDAZOLE  IVPB 500milliGRAM(s) IV Intermittent every 8 hours  heparin  Injectable 5000Unit(s) SubCutaneous every 8 hours  ALBUTerol/ipratropium for Nebulization 3milliLiter(s) Nebulizer every 6 hours  piperacillin/tazobactam IVPB. 3.375Gram(s) IV Intermittent every 12 hours  fluconAZOLE IVPB  IV Intermittent   fluconAZOLE IVPB 100milliGRAM(s) IV Intermittent every 24 hours  norepinephrine Infusion 0.01MICROgram(s)/kG/Min IV Continuous <Continuous>  sodium chloride 0.9% Bolus 2000milliLiter(s) IV Bolus once  vasopressin Infusion 0.04Unit(s)/Min IV Continuous <Continuous>  sodium bicarbonate  Infusion 0.234mEq/kG/Hr IV Continuous <Continuous>  insulin lispro (HumaLOG) corrective regimen sliding scale  SubCutaneous three times a day before meals  insulin lispro (HumaLOG) corrective regimen sliding scale  SubCutaneous at bedtime  dextrose 5%. 1000milliLiter(s) IV Continuous <Continuous>    MEDICATIONS  (PRN):  naloxone Injectable 0.1milliGRAM(s) IV Push every 3 minutes PRN For ANY of the following changes in patient status:  A. RR LESS THAN 10 breaths per minute, B. Oxygen saturation LESS THAN 90%, C. Sedation score of 6  ondansetron Injectable 4milliGRAM(s) IV Push every 6 hours PRN Nausea  ondansetron Injectable 4milliGRAM(s) IV Push every 6 hours PRN Nausea  dextrose Gel 1Dose(s) Oral once PRN Blood Glucose LESS THAN 70 milliGRAM(s)/deciliter  glucagon  Injectable 1milliGRAM(s) IntraMuscular once PRN Glucose LESS THAN 70 milligrams/deciliter  acetaminophen  Suppository 650milliGRAM(s) Rectal every 6 hours PRN For Temp greater than 38.5 C (101.3 F)  morphine  - Injectable 2milliGRAM(s) IV Push every 4 hours PRN Moderate Pain (4 - 6)  LORazepam   Injectable 1milliGRAM(s) IntraMuscular every 6 hours PRN Anxiety  dextrose Gel 1Dose(s) Oral once PRN Blood Glucose LESS THAN 70 milliGRAM(s)/deciliter    Vital Signs Last 24 Hrs  T(C): 37.7, Max: 37.7 (05-24 @ 02:00)  T(F): 99.8, Max: 99.8 (05-24 @ 02:00)  HR: 73 (64 - 117)  BP: 129/62 (103/61 - 133/52)  BP(mean): 76 (44 - 106)  RR: 20 (16 - 33)  SpO2: 98% (80% - 98%)  I&O's Detail    I & Os for current day (as of 24 May 2017 07:21)  =============================================  IN:    sodium bicarbonate  Infusion: 3150 ml    Sodium Chloride 0.9% IV Bolus: 2000 ml    norepinephrine Infusion: 1462 ml    IV PiggyBack: 800 ml    sodium chloride 0.9%: 800 ml    vasopressin Infusion: 60 ml    Total IN: 8272 ml  ---------------------------------------------  OUT:    Indwelling Catheter - Urethral: 2520 ml    Nasoenteral Tube: 1700 ml    Bulb: 140 ml    Colostomy: 30 ml    Total OUT: 4390 ml  ---------------------------------------------  Total NET: 3882 ml    ABD exam : provena in place, LUIS M with serosanguineous, colostomy pink, minimal stool output, soft, abd wall with fluid retention, approp tender                        13.9   27.7  )-----------( 194      ( 23 May 2017 23:00 )             40.8     05-23    142  |  114<H>  |  37<H>  ----------------------------<  168<H>  3.7   |  24  |  1.81<H>    Ca    6.1<LL>      23 May 2017 23:00  Phos  3.4     05-23  Mg     1.8     05-23

## 2017-05-24 NOTE — PROGRESS NOTE ADULT - ASSESSMENT
46yo M with:  Septic Shock - On 2-pressors  s/p resection of sigmoid diverticulitis with abscess on 5/21  HARDIK due to ATN  metabolic acidosis    Plan:  Cont ICU Care  Serial Neuro Exams  Septic Shock - On pressors  Keep MAP > 65  High risk for intubation given shock  NPO  dc HCO3 gtt - Patient adamantly refuses an ABG  Start 1/2 NS @ 75cc/hr  Monitor renal function - Slowly improving  Strict I/O's - Received > 14L of IVF   IV Zosyn / Diflucan per ID  DVT prophylaxis - Hep SQ  I have spoken to patient, his wife, and sister-in-law on the phone regarding patient's current status, plan of care, and prognosis with all questions answered in detail.  Case d/w CRS team and Dr. Ruff

## 2017-05-24 NOTE — PROGRESS NOTE ADULT - ASSESSMENT
47M with no significant past medical history admitted on 5/19 for evaluation of lower abdominal pain, decreased appetite and upon admission was found to have sigmoid diverticulitis; patient was medically managed however, on 5/21 patient underwent sigmoid resection, ileocolostomy and currently is post op asking for ice chips. He has decreased urine output and worsening renal function as well as hypotension  1. Post op leukocytosis, hypotension most likely secondary to peritonitis  - follow up cultures   - day #6 zosyn and flagyl, day #3 diflucan  -will increase dose of zosyn given improvement in renal function  - tolerating antibiotics without rashes or side effects   - iv hydration and supportive care   -pressors per icu  - renal evaluation in progress  - wound care per surgery  Will follow

## 2017-05-24 NOTE — PROGRESS NOTE ADULT - SUBJECTIVE AND OBJECTIVE BOX
HPI:  47M with no significant past medical history admitted on 5/19 for evaluation of lower abdominal pain, decreased appetite and upon admission was found to have sigmoid diverticulitis; patient was medically managed however, on 5/21 patient underwent sigmoid resection, ileocolostomy and currently is post op asking for ice chips. He has decreased urine output and worsening renal function as well as hypotension.  Today 5/23 patient is on pressor support, intermittently febrile  Today 5/24 patient still on pressor support, however, doses coming down, started to have urine output and has been afebrile for greater than 24 hours      MEDICATIONS  (STANDING):  pantoprazole  Injectable 40milliGRAM(s) IV Push daily  metroNIDAZOLE  IVPB 500milliGRAM(s) IV Intermittent every 8 hours  heparin  Injectable 5000Unit(s) SubCutaneous every 8 hours  ALBUTerol/ipratropium for Nebulization 3milliLiter(s) Nebulizer every 6 hours  fluconAZOLE IVPB  IV Intermittent   fluconAZOLE IVPB 100milliGRAM(s) IV Intermittent every 24 hours  norepinephrine Infusion 0.01MICROgram(s)/kG/Min IV Continuous <Continuous>  vasopressin Infusion 0.04Unit(s)/Min IV Continuous <Continuous>  sodium bicarbonate  Infusion 0.234mEq/kG/Hr IV Continuous <Continuous>  sodium chloride 0.45%. 1000milliLiter(s) IV Continuous <Continuous>  piperacillin/tazobactam IVPB. 3.375Gram(s) IV Intermittent every 8 hours    MEDICATIONS  (PRN):  naloxone Injectable 0.1milliGRAM(s) IV Push every 3 minutes PRN For ANY of the following changes in patient status:  A. RR LESS THAN 10 breaths per minute, B. Oxygen saturation LESS THAN 90%, C. Sedation score of 6  ondansetron Injectable 4milliGRAM(s) IV Push every 6 hours PRN Nausea  ondansetron Injectable 4milliGRAM(s) IV Push every 6 hours PRN Nausea  acetaminophen  Suppository 650milliGRAM(s) Rectal every 6 hours PRN For Temp greater than 38.5 C (101.3 F)  morphine  - Injectable 2milliGRAM(s) IV Push every 4 hours PRN Moderate Pain (4 - 6)  LORazepam   Injectable 1milliGRAM(s) IntraMuscular every 6 hours PRN Anxiety      Vital Signs Last 24 Hrs  T(C): 36.8, Max: 37.7 (05-24 @ 02:00)  T(F): 98.3, Max: 99.8 (05-24 @ 02:00)  HR: 67 (63 - 116)  BP: 119/65 (103/80 - 133/52)  BP(mean): 76 (44 - 106)  RR: 19 (0 - 33)  SpO2: 98% (80% - 99%)    Physical Exam:        Constitutional: frail looking  HEENT: NC/AT, EOMI, PERRLA, ngt in place  Neck: supple  Respiratory: clear  Cardiovascular: S1S2 regular, no murmurs  Abdomen: kimmy drain in place, wound with wound vac, left sided ostomy  Genitourinary: deferred  Rectal: deferred  Musculoskeletal: no muscle tenderness, no joint swelling or tenderness  Neurological: AxOx3, moving all extremities, no focal deficits  Skin: no rashes, mild mottling of lower extremities             Labs:           Labs:                        13.5   24.4  )-----------( 176      ( 24 May 2017 05:45 )             38.5     05-24    146<H>  |  113<H>  |  33<H>  ----------------------------<  167<H>  3.5   |  24  |  1.47<H>    Ca    6.3<LL>      24 May 2017 05:45  Phos  1.5     05-24  Mg     2.0     05-24             Cultures:          Cultures:              Radiology:EXAM:  CT ABDOMEN AND PELVIS IC                            PROCEDURE DATE:  05/19/2017        INTERPRETATION:  CLINICAL INFORMATION: 47-year-old man with abdominal   pain assess for appendicitis     TECHNIQUE:    CT of abdomen and pelvis was performed with axial images   were obtained from the diaphragm to pubic symphysis oral and IV contrast.    COMPARISON:  None.    FINDINGS:    Liver: Borderline hepatomegaly with mild fatty infiltration otherwise   within normal limits  Gallbladder: Within normal limits  Bile ducts: No intrahepatic or extrahepatic biliary dilatation  Pancreas: within normal limits    Spleen: within normal limits  Adrenal: within normal limits  Kidneys, Ureters and Bladder: Symmetric enhancement bilaterally. No   perinephric attending or collections. No hydronephrosis. No intrarenal   calculi. Normal caliber of the ureters. Limited unopacified nondistended   bladder.    Pelvis: No pelvic adenopathy or pelvic free fluid.  Small fat-containing   bilateral inguinal hernias.      Bowel: No bowel obstruction.  There are few scattered colonic   diverticula. There is focal thickening of sigmoid colon in the pelvis   associated with mesenteric fat stranding and thickening of adjacent   fascial planes with localized perforation and small air bubbles without   abscess. Findings are consistent with acute rupture diverticulitis. Small   free fluid adjacent to gas filled appendix.    Peritoneum: Small ascites, no organized fluid collections.    Retroperitoneum: within normal limits  Vessels: Patent.    Abdominal wall: Small fat-containing umbilical hernia.    Lower chest and lung Bases: The visualized lung bases clear except for   subsegmental dependent atelectasis. Heart normal in size.   Bones: Degenerative changes.     IMPRESSION:     Focal thickening of sigmoid colon with mesenteric fat stranding and   thickening of fascial planes with scattered adjacent air bubbles   consistent with acute diverticulitis without abscess. Small free fluid in   the abdomen extending to the right quadrant.        Advanced directive addressed: full resuscitation

## 2017-05-24 NOTE — PROGRESS NOTE ADULT - SUBJECTIVE AND OBJECTIVE BOX
NEPHROLOGY INTERVAL HPI/OVERNIGHT EVENTS:  remains npo.  sob yesterday with lasix 10 mg iv given.  pressor requirements decreasing.        MEDICATIONS  (STANDING):  pantoprazole  Injectable 40milliGRAM(s) IV Push daily  metroNIDAZOLE  IVPB 500milliGRAM(s) IV Intermittent every 8 hours  heparin  Injectable 5000Unit(s) SubCutaneous every 8 hours  ALBUTerol/ipratropium for Nebulization 3milliLiter(s) Nebulizer every 6 hours  fluconAZOLE IVPB  IV Intermittent   fluconAZOLE IVPB 100milliGRAM(s) IV Intermittent every 24 hours  norepinephrine Infusion 0.01MICROgram(s)/kG/Min IV Continuous <Continuous>  vasopressin Infusion 0.04Unit(s)/Min IV Continuous <Continuous>  sodium bicarbonate  Infusion 0.234mEq/kG/Hr IV Continuous <Continuous>  sodium chloride 0.45%. 1000milliLiter(s) IV Continuous <Continuous>  piperacillin/tazobactam IVPB. 3.375Gram(s) IV Intermittent every 8 hours    MEDICATIONS  (PRN):  naloxone Injectable 0.1milliGRAM(s) IV Push every 3 minutes PRN For ANY of the following changes in patient status:  A. RR LESS THAN 10 breaths per minute, B. Oxygen saturation LESS THAN 90%, C. Sedation score of 6  ondansetron Injectable 4milliGRAM(s) IV Push every 6 hours PRN Nausea  ondansetron Injectable 4milliGRAM(s) IV Push every 6 hours PRN Nausea  acetaminophen  Suppository 650milliGRAM(s) Rectal every 6 hours PRN For Temp greater than 38.5 C (101.3 F)  morphine  - Injectable 2milliGRAM(s) IV Push every 4 hours PRN Moderate Pain (4 - 6)  LORazepam   Injectable 1milliGRAM(s) IntraMuscular every 6 hours PRN Anxiety      Allergies    No Known Allergies    Intolerances        I&O's Detail  I & Os for 24h ending 24 May 2017 07:00  =============================================  IN:    sodium bicarbonate  Infusion: 3150 ml    Sodium Chloride 0.9% IV Bolus: 2000 ml    norepinephrine Infusion: 1462 ml    IV PiggyBack: 800 ml    sodium chloride 0.9%: 800 ml    vasopressin Infusion: 60 ml    Total IN: 8272 ml  ---------------------------------------------  OUT:    Indwelling Catheter - Urethral: 2520 ml    Nasoenteral Tube: 1700 ml    Bulb: 140 ml    Colostomy: 30 ml    Total OUT: 4390 ml  ---------------------------------------------  Total NET: 3882 ml    I & Os for current day (as of 24 May 2017 12:45)  =============================================  IN:    sodium bicarbonate  Infusion: 300 ml    norepinephrine Infusion: 223.6 ml    sodium chloride 0.45%.: 150 ml    vasopressin Infusion: 9.6 ml    Total IN: 683.2 ml  ---------------------------------------------  OUT:    Indwelling Catheter - Urethral: 275 ml    Total OUT: 275 ml  ---------------------------------------------  Total NET: 408.2 ml        Vital Signs Last 24 Hrs  T(C): 36.8, Max: 37.7 (- @ 02:00)  T(F): 98.3, Max: 99.8 (05- @ 02:00)  HR: 64 (63 - 116)  BP: 126/62 (103/80 - 133/52)  BP(mean): 78 (44 - 106)  RR: 8 (0 - 33)  SpO2: 100% (80% - 100%)  Daily     Daily     PHYSICAL EXAM:  General: alert. awake O  HEENT: MMM  CV: s1s2 rrr  LUNGS: B/L CTA  EXT: no edema    LABS:                        13.5   24.4  )-----------( 176      ( 24 May 2017 05:45 )             38.5     05-24    146<H>  |  113<H>  |  33<H>  ----------------------------<  167<H>  3.5   |  24  |  1.47<H>    Ca    6.3<LL>      24 May 2017 05:45  Phos  1.5       Mg     2.0             Urinalysis Basic - ( 22 May 2017 15:00 )    Color: Gabi / Appearance: Clear / S.020 / pH: x  Gluc: x / Ketone: Trace  / Bili: Moderate / Urobili: 4 mg/dL   Blood: x / Protein: 30 mg/dL / Nitrite: Positive   Leuk Esterase: Trace / RBC: 25-50 /HPF / WBC 6-10   Sq Epi: x / Non Sq Epi: x / Bacteria: Many      Magnesium, Serum: 2.0 mg/dL ( @ 05:45)  Phosphorus Level, Serum: 1.5 mg/dL ( @ 05:45)    ABG - ( 23 May 2017 06:59 )  pH: 7.19  /  pCO2: 35    /  pO2: 68    / HCO3: 13    / Base Excess: -14   /  SaO2: 89 NEPHROLOGY INTERVAL HPI/OVERNIGHT EVENTS:  remains npo.  sob yesterday with lasix 10 mg iv given.  pressor requirements decreasing.        MEDICATIONS  (STANDING):  pantoprazole  Injectable 40milliGRAM(s) IV Push daily  metroNIDAZOLE  IVPB 500milliGRAM(s) IV Intermittent every 8 hours  heparin  Injectable 5000Unit(s) SubCutaneous every 8 hours  ALBUTerol/ipratropium for Nebulization 3milliLiter(s) Nebulizer every 6 hours  fluconAZOLE IVPB  IV Intermittent   fluconAZOLE IVPB 100milliGRAM(s) IV Intermittent every 24 hours  norepinephrine Infusion 0.01MICROgram(s)/kG/Min IV Continuous <Continuous>  vasopressin Infusion 0.04Unit(s)/Min IV Continuous <Continuous>  sodium bicarbonate  Infusion 0.234mEq/kG/Hr IV Continuous <Continuous>  sodium chloride 0.45%. 1000milliLiter(s) IV Continuous <Continuous>  piperacillin/tazobactam IVPB. 3.375Gram(s) IV Intermittent every 8 hours    MEDICATIONS  (PRN):  naloxone Injectable 0.1milliGRAM(s) IV Push every 3 minutes PRN For ANY of the following changes in patient status:  A. RR LESS THAN 10 breaths per minute, B. Oxygen saturation LESS THAN 90%, C. Sedation score of 6  ondansetron Injectable 4milliGRAM(s) IV Push every 6 hours PRN Nausea  ondansetron Injectable 4milliGRAM(s) IV Push every 6 hours PRN Nausea  acetaminophen  Suppository 650milliGRAM(s) Rectal every 6 hours PRN For Temp greater than 38.5 C (101.3 F)  morphine  - Injectable 2milliGRAM(s) IV Push every 4 hours PRN Moderate Pain (4 - 6)  LORazepam   Injectable 1milliGRAM(s) IntraMuscular every 6 hours PRN Anxiety      Allergies    No Known Allergies    Intolerances        I&O's Detail  I & Os for 24h ending 24 May 2017 07:00  =============================================  IN:    sodium bicarbonate  Infusion: 3150 ml    Sodium Chloride 0.9% IV Bolus: 2000 ml    norepinephrine Infusion: 1462 ml    IV PiggyBack: 800 ml    sodium chloride 0.9%: 800 ml    vasopressin Infusion: 60 ml    Total IN: 8272 ml  ---------------------------------------------  OUT:    Indwelling Catheter - Urethral: 2520 ml    Nasoenteral Tube: 1700 ml    Bulb: 140 ml    Colostomy: 30 ml    Total OUT: 4390 ml  ---------------------------------------------  Total NET: 3882 ml    I & Os for current day (as of 24 May 2017 12:45)  =============================================  IN:    sodium bicarbonate  Infusion: 300 ml    norepinephrine Infusion: 223.6 ml    sodium chloride 0.45%.: 150 ml    vasopressin Infusion: 9.6 ml    Total IN: 683.2 ml  ---------------------------------------------  OUT:    Indwelling Catheter - Urethral: 275 ml    Total OUT: 275 ml  ---------------------------------------------  Total NET: 408.2 ml        Vital Signs Last 24 Hrs  T(C): 36.8, Max: 37.7 (- @ 02:00)  T(F): 98.3, Max: 99.8 (05- @ 02:00)  HR: 64 (63 - 116)  BP: 126/62 (103/80 - 133/52)  BP(mean): 78 (44 - 106)  RR: 8 (0 - 33)  SpO2: 100% (80% - 100%)  Daily     Daily     PHYSICAL EXAM:  General: alert. awake O  HEENT: MMM  CV: s1s2 rrr  LUNGS: B/L CTA  EXT: 4+ edema    LABS:                        13.5   24.4  )-----------( 176      ( 24 May 2017 05:45 )             38.5     -    146<H>  |  113<H>  |  33<H>  ----------------------------<  167<H>  3.5   |  24  |  1.47<H>    Ca    6.3<LL>      24 May 2017 05:45  Phos  1.5       Mg     2.0             Urinalysis Basic - ( 22 May 2017 15:00 )    Color: Gabi / Appearance: Clear / S.020 / pH: x  Gluc: x / Ketone: Trace  / Bili: Moderate / Urobili: 4 mg/dL   Blood: x / Protein: 30 mg/dL / Nitrite: Positive   Leuk Esterase: Trace / RBC: 25-50 /HPF / WBC 6-10   Sq Epi: x / Non Sq Epi: x / Bacteria: Many      Magnesium, Serum: 2.0 mg/dL ( @ 05:45)  Phosphorus Level, Serum: 1.5 mg/dL ( @ 05:45)    ABG - ( 23 May 2017 06:59 )  pH: 7.19  /  pCO2: 35    /  pO2: 68    / HCO3: 13    / Base Excess: -14   /  SaO2: 89

## 2017-05-24 NOTE — PROGRESS NOTE ADULT - SUBJECTIVE AND OBJECTIVE BOX
Had shortness of breath and received 10 mg of Lasix with very good response from urine output. Hemodynamically improving but still on 2 pressors. ICU delirium overnight requiring ativan. Complains of chest pain this morning    Exam:  ICU Vital Signs Last 24 Hrs  T(C): 37.7, Max: 37.7 (05-24 @ 02:00)  T(F): 99.8, Max: 99.8 (05-24 @ 02:00)  HR: 68 (64 - 117)  BP: 117/59 (103/80 - 133/52)  BP(mean): 71 (44 - 106)  RR: 20 (17 - 33)  SpO2: 98% (80% - 98%)      General: in no distress, confused  Respiratory: non labored  Heart: regular rate and rhythm  Abdomen: distended, non tender, colostomy with scant stool, incision with prevena, LUIS M drain with SS output  Neuro: alert and oriented                          13.5   24.4  )-----------( 176      ( 24 May 2017 05:45 )             38.5   05-24    146<H>  |  113<H>  |  33<H>  ----------------------------<  167<H>  3.5   |  24  |  1.47<H>    Ca    6.3<LL>      24 May 2017 05:45  Phos  1.5     05-24  Mg     2.0     05-24

## 2017-05-24 NOTE — PROGRESS NOTE ADULT - SUBJECTIVE AND OBJECTIVE BOX
ATSP by RN to speak with family. I spoke to patient, his wife multiple times during the day, and a second visitor in the prescience of the bedside nurse, Assistant Nurse Manager, Supervisor, and Critical Nursing Director. They were updated on the status of the patient. He remains on 2-pressors for BP support. His requirements are decreasing. His lab numbers are improving and has good urine output. He remains on pressors and broad spectrum antibiotics in septic shock. They were updated on the status of the patient, and his plan of care. All questions were answered in detail.    Additional 30-min CC time

## 2017-05-24 NOTE — PROGRESS NOTE ADULT - ASSESSMENT
# HARDIK/ ATN with minimal UOP  # Metabolic acidosis  # S/p ileocolic resection for perforated signmoid    PLAN  - NS at 200ml/hr for next 6 hrs and then maintain NS at 150 ml/hr to maintain MAP at 60s. May require pressors if unable to maintain MAP with this regimen.     - lactate and repeat BMP   - strict 1/0    - dose meds for crcl of less than 10 ml/min ( zosyn)      5/23  sepsis, peritonitis  hypotension, on 2 pressors  lactic acidosis resolving  resp compensation  good uo  cont abx as outlined  d/w surgery, intensivist    5/24 MK  - HARDIK/ATN, non oliguric and improving.  remains significantly fluid overloaded.  Will give trial of lasix and moniter response.    dw dr tomas and RN.  pt updated.

## 2017-05-24 NOTE — PROGRESS NOTE ADULT - ASSESSMENT
POD 2 Ex-lap, Hartmanns, ileocolostomy for perforated sigmoid diverticulitis with SB phlegmon causing SBO;     PRN morphine for pain.  Levo gtt and vasopressin gtt for HD support. Wean per ICU protocol. Lactate 1.8 this AM from high of 8.  Cont NPO, NGT for now. Some colostomy output noted.  Oliguria resolving, Cr 1.8 from 2.9 yesterday. Bicarb gtt added.  Hgb 14 indicating gradual return of interstitial fluids into vasculature and improving hypovolemic status. On hep SQ for DVT prop.  Afebrile overnight, leukocytosis persists but decreasing, blood cultures ordered. On Zosyn, Flagyl, Diflucan.  Appreciate input from Pulm, ID, renal and medical services.   Cont supportive care for massive postop SIRS response.   Pt's wife at bedside, informed and updated regarding patient condition.

## 2017-05-24 NOTE — PROGRESS NOTE ADULT - SUBJECTIVE AND OBJECTIVE BOX
Called to evaluate patient's VAC dressing due to a possible leak.  Upon arrival, VAC machine was turned off.  Power was placed back on.  No evidence of a leak noted within 5 minutes of turning VAC on.  Extra tegaderm placed over existing VAC to secure any possible leaks.  Will monitor.

## 2017-05-24 NOTE — PROVIDER CONTACT NOTE (CRITICAL VALUE NOTIFICATION) - ACTION/TREATMENT ORDERED:
MD martinez.
new orders recieved
no additional orders
no new orders received
supplement to be ordered
1L LR bolus ordered

## 2017-05-24 NOTE — PROGRESS NOTE ADULT - ASSESSMENT
PROBLEMS:    Diverticulitis of intestine with perforation S/P LAP  septic shock  DYSNEA  HYPOXAMIA  ATELECTASIS  COPD    PLAN;  pulmonary unchanged  iv abx-zosyn/flagyl/diflucan  titrate pressors  AEROSOLS  FIO2  SUPPORTIVE CARE  DVT PROPHYLASIX

## 2017-05-25 LAB
ANION GAP SERPL CALC-SCNC: 6 MMOL/L — SIGNIFICANT CHANGE UP (ref 5–17)
BUN SERPL-MCNC: 26 MG/DL — HIGH (ref 7–23)
CALCIUM SERPL-MCNC: 6.8 MG/DL — LOW (ref 8.5–10.1)
CHLORIDE SERPL-SCNC: 112 MMOL/L — HIGH (ref 96–108)
CO2 SERPL-SCNC: 27 MMOL/L — SIGNIFICANT CHANGE UP (ref 22–31)
CREAT SERPL-MCNC: 1.07 MG/DL — SIGNIFICANT CHANGE UP (ref 0.5–1.3)
CULTURE RESULTS: SIGNIFICANT CHANGE UP
GLUCOSE SERPL-MCNC: 113 MG/DL — HIGH (ref 70–99)
HCT VFR BLD CALC: 38 % — LOW (ref 39–50)
HGB BLD-MCNC: 12.7 G/DL — LOW (ref 13–17)
MAGNESIUM SERPL-MCNC: 2.4 MG/DL — SIGNIFICANT CHANGE UP (ref 1.6–2.6)
MCHC RBC-ENTMCNC: 30 PG — SIGNIFICANT CHANGE UP (ref 27–34)
MCHC RBC-ENTMCNC: 33.4 GM/DL — SIGNIFICANT CHANGE UP (ref 32–36)
MCV RBC AUTO: 89.8 FL — SIGNIFICANT CHANGE UP (ref 80–100)
PHOSPHATE SERPL-MCNC: 1.9 MG/DL — LOW (ref 2.5–4.5)
PLATELET # BLD AUTO: 132 K/UL — LOW (ref 150–400)
POTASSIUM SERPL-MCNC: 4.1 MMOL/L — SIGNIFICANT CHANGE UP (ref 3.5–5.3)
POTASSIUM SERPL-SCNC: 4.1 MMOL/L — SIGNIFICANT CHANGE UP (ref 3.5–5.3)
RBC # BLD: 4.24 M/UL — SIGNIFICANT CHANGE UP (ref 4.2–5.8)
RBC # FLD: 12.4 % — SIGNIFICANT CHANGE UP (ref 10.3–14.5)
SODIUM SERPL-SCNC: 145 MMOL/L — SIGNIFICANT CHANGE UP (ref 135–145)
SPECIMEN SOURCE: SIGNIFICANT CHANGE UP
WBC # BLD: 17 K/UL — HIGH (ref 3.8–10.5)
WBC # FLD AUTO: 17 K/UL — HIGH (ref 3.8–10.5)

## 2017-05-25 RX ORDER — ALBUMIN HUMAN 25 %
50 VIAL (ML) INTRAVENOUS EVERY 12 HOURS
Qty: 0 | Refills: 0 | Status: DISCONTINUED | OUTPATIENT
Start: 2017-05-25 | End: 2017-05-25

## 2017-05-25 RX ORDER — FUROSEMIDE 40 MG
20 TABLET ORAL DAILY
Qty: 0 | Refills: 0 | Status: DISCONTINUED | OUTPATIENT
Start: 2017-05-25 | End: 2017-05-30

## 2017-05-25 RX ORDER — VANCOMYCIN HCL 1 G
750 VIAL (EA) INTRAVENOUS EVERY 12 HOURS
Qty: 0 | Refills: 0 | Status: DISCONTINUED | OUTPATIENT
Start: 2017-05-25 | End: 2017-05-30

## 2017-05-25 RX ORDER — POTASSIUM PHOSPHATE, MONOBASIC POTASSIUM PHOSPHATE, DIBASIC 236; 224 MG/ML; MG/ML
15 INJECTION, SOLUTION INTRAVENOUS ONCE
Qty: 0 | Refills: 0 | Status: COMPLETED | OUTPATIENT
Start: 2017-05-25 | End: 2017-05-25

## 2017-05-25 RX ORDER — CALCIUM GLUCONATE 100 MG/ML
2 VIAL (ML) INTRAVENOUS ONCE
Qty: 0 | Refills: 0 | Status: COMPLETED | OUTPATIENT
Start: 2017-05-25 | End: 2017-05-25

## 2017-05-25 RX ORDER — FUROSEMIDE 40 MG
20 TABLET ORAL EVERY 12 HOURS
Qty: 0 | Refills: 0 | Status: DISCONTINUED | OUTPATIENT
Start: 2017-05-25 | End: 2017-05-25

## 2017-05-25 RX ADMIN — Medication 3 MILLILITER(S): at 20:24

## 2017-05-25 RX ADMIN — PIPERACILLIN AND TAZOBACTAM 25 GRAM(S): 4; .5 INJECTION, POWDER, LYOPHILIZED, FOR SOLUTION INTRAVENOUS at 13:37

## 2017-05-25 RX ADMIN — Medication 1.8 MICROGRAM(S)/KG/MIN: at 00:15

## 2017-05-25 RX ADMIN — PANTOPRAZOLE SODIUM 40 MILLIGRAM(S): 20 TABLET, DELAYED RELEASE ORAL at 11:47

## 2017-05-25 RX ADMIN — PIPERACILLIN AND TAZOBACTAM 25 GRAM(S): 4; .5 INJECTION, POWDER, LYOPHILIZED, FOR SOLUTION INTRAVENOUS at 22:16

## 2017-05-25 RX ADMIN — FLUCONAZOLE 50 MILLIGRAM(S): 150 TABLET ORAL at 13:36

## 2017-05-25 RX ADMIN — HEPARIN SODIUM 5000 UNIT(S): 5000 INJECTION INTRAVENOUS; SUBCUTANEOUS at 06:37

## 2017-05-25 RX ADMIN — HEPARIN SODIUM 5000 UNIT(S): 5000 INJECTION INTRAVENOUS; SUBCUTANEOUS at 21:20

## 2017-05-25 RX ADMIN — POTASSIUM PHOSPHATE, MONOBASIC POTASSIUM PHOSPHATE, DIBASIC 62.5 MILLIMOLE(S): 236; 224 INJECTION, SOLUTION INTRAVENOUS at 09:40

## 2017-05-25 RX ADMIN — PIPERACILLIN AND TAZOBACTAM 25 GRAM(S): 4; .5 INJECTION, POWDER, LYOPHILIZED, FOR SOLUTION INTRAVENOUS at 06:37

## 2017-05-25 RX ADMIN — HEPARIN SODIUM 5000 UNIT(S): 5000 INJECTION INTRAVENOUS; SUBCUTANEOUS at 13:37

## 2017-05-25 RX ADMIN — Medication 3 MILLILITER(S): at 14:05

## 2017-05-25 RX ADMIN — Medication 20 MILLIGRAM(S): at 11:47

## 2017-05-25 RX ADMIN — Medication 100 MILLIGRAM(S): at 05:05

## 2017-05-25 RX ADMIN — Medication 100 MILLIGRAM(S): at 13:36

## 2017-05-25 RX ADMIN — Medication 100 MILLIGRAM(S): at 21:20

## 2017-05-25 RX ADMIN — Medication 150 MILLIGRAM(S): at 17:51

## 2017-05-25 RX ADMIN — Medication 200 GRAM(S): at 09:40

## 2017-05-25 NOTE — PROGRESS NOTE ADULT - ASSESSMENT
POD 4 s/p sigmoid resection and rutledge's for diverticulitis and ileocolic resection. Improving sepsis and resolved HARDIK. Now has GPC bacteremia    Wean pressors today  if pressors weaned and remains hemodynamically stable, ? removal of central line in light of positive blood cultures  Remain NPO, NGT still had > 1600 cc output  Antibiotics per ID  Physical therapy consult  Cover midline incision prn- anticipate moderate amount of serous output as he starts mobilizing fluids

## 2017-05-25 NOTE — PROGRESS NOTE ADULT - SUBJECTIVE AND OBJECTIVE BOX
HPI:  47M with no significant past medical history admitted on 5/19 for evaluation of lower abdominal pain, decreased appetite and upon admission was found to have sigmoid diverticulitis; patient was medically managed however, on 5/21 patient underwent sigmoid resection, ileocolostomy and currently is post op asking for ice chips. He has decreased urine output and worsening renal function as well as hypotension.  Today 5/23 patient is on pressor support, intermittently febrile  Today 5/24 patient still on pressor support, however, doses coming down, started to have urine output and has been afebrile for greater than 24 hours  Today 5/25 patient now off pressors; sitting in chair, notes overall he is feeling better        MEDICATIONS  (STANDING):  pantoprazole  Injectable 40milliGRAM(s) IV Push daily  metroNIDAZOLE  IVPB 500milliGRAM(s) IV Intermittent every 8 hours  heparin  Injectable 5000Unit(s) SubCutaneous every 8 hours  ALBUTerol/ipratropium for Nebulization 3milliLiter(s) Nebulizer every 6 hours  fluconAZOLE IVPB  IV Intermittent   fluconAZOLE IVPB 100milliGRAM(s) IV Intermittent every 24 hours  norepinephrine Infusion 0.01MICROgram(s)/kG/Min IV Continuous <Continuous>  vasopressin Infusion 0.04Unit(s)/Min IV Continuous <Continuous>  piperacillin/tazobactam IVPB. 3.375Gram(s) IV Intermittent every 8 hours  vancomycin  IVPB 750milliGRAM(s) IV Intermittent every 12 hours  furosemide   Injectable 20milliGRAM(s) IV Push every 12 hours    MEDICATIONS  (PRN):  naloxone Injectable 0.1milliGRAM(s) IV Push every 3 minutes PRN For ANY of the following changes in patient status:  A. RR LESS THAN 10 breaths per minute, B. Oxygen saturation LESS THAN 90%, C. Sedation score of 6  ondansetron Injectable 4milliGRAM(s) IV Push every 6 hours PRN Nausea  ondansetron Injectable 4milliGRAM(s) IV Push every 6 hours PRN Nausea  acetaminophen  Suppository 650milliGRAM(s) Rectal every 6 hours PRN For Temp greater than 38.5 C (101.3 F)  morphine  - Injectable 2milliGRAM(s) IV Push every 4 hours PRN Moderate Pain (4 - 6)  LORazepam   Injectable 1milliGRAM(s) IntraMuscular every 6 hours PRN Anxiety      Vital Signs Last 24 Hrs  T(C): 36.2, Max: 36.2 (05-24 @ 21:00)  T(F): 97.1, Max: 97.1 (05-24 @ 21:00)  HR: 66 (54 - 83)  BP: 111/58 (98/70 - 133/65)  BP(mean): 69 (65 - 90)  RR: 17 (0 - 23)  SpO2: 96% (96% - 100%)    Physical Exam:            Constitutional: frail looking  HEENT: NC/AT, EOMI, PERRLA, ngt in place  Neck: supple  Respiratory: clear  Cardiovascular: S1S2 regular, no murmurs  Abdomen: kimmy drain in place, dressing in place left sided ostomy  Genitourinary: deferred  Rectal: deferred  Musculoskeletal: no muscle tenderness, no joint swelling or tenderness  Neurological: AxOx3, moving all extremities, no focal deficits  Skin: no rashes, mild mottling of lower extremities            Labs:                        12.7   17.0  )-----------( 132      ( 25 May 2017 06:40 )             38.0     05-25    145  |  112<H>  |  26<H>  ----------------------------<  113<H>  4.1   |  27  |  1.07    Ca    6.8<L>      25 May 2017 06:40  Phos  1.9     05-25  Mg     2.4     05-25             Cultures: Culture - Blood (05.23.17 @ 11:20)    Gram Stain:   Growth in anaerobic bottle: Gram Positive Cocci in Clusters    Specimen Source: .Blood Blood    Culture Results:   Growth in anaerobic bottle: Gram Positive Cocci in Clusters            Radiology:EXAM:  CT ABDOMEN AND PELVIS IC                            PROCEDURE DATE:  05/19/2017        INTERPRETATION:  CLINICAL INFORMATION: 47-year-old man with abdominal   pain assess for appendicitis     TECHNIQUE:    CT of abdomen and pelvis was performed with axial images   were obtained from the diaphragm to pubic symphysis oral and IV contrast.    COMPARISON:  None.    FINDINGS:    Liver: Borderline hepatomegaly with mild fatty infiltration otherwise   within normal limits  Gallbladder: Within normal limits  Bile ducts: No intrahepatic or extrahepatic biliary dilatation  Pancreas: within normal limits    Spleen: within normal limits  Adrenal: within normal limits  Kidneys, Ureters and Bladder: Symmetric enhancement bilaterally. No   perinephric attending or collections. No hydronephrosis. No intrarenal   calculi. Normal caliber of the ureters. Limited unopacified nondistended   bladder.    Pelvis: No pelvic adenopathy or pelvic free fluid.  Small fat-containing   bilateral inguinal hernias.      Bowel: No bowel obstruction.  There are few scattered colonic   diverticula. There is focal thickening of sigmoid colon in the pelvis   associated with mesenteric fat stranding and thickening of adjacent   fascial planes with localized perforation and small air bubbles without   abscess. Findings are consistent with acute rupture diverticulitis. Small   free fluid adjacent to gas filled appendix.    Peritoneum: Small ascites, no organized fluid collections.    Retroperitoneum: within normal limits  Vessels: Patent.    Abdominal wall: Small fat-containing umbilical hernia.    Lower chest and lung Bases: The visualized lung bases clear except for   subsegmental dependent atelectasis. Heart normal in size.   Bones: Degenerative changes.     IMPRESSION:     Focal thickening of sigmoid colon with mesenteric fat stranding and   thickening of fascial planes with scattered adjacent air bubbles   consistent with acute diverticulitis without abscess. Small free fluid in   the abdomen extending to the right quadrant.        Advanced directive addressed: full resuscitation

## 2017-05-25 NOTE — PROGRESS NOTE ADULT - SUBJECTIVE AND OBJECTIVE BOX
NEPHROLOGY INTERVAL HPI/OVERNIGHT EVENTS:  5/25 SY  No acute events.  BP stable off pressor.  Attempting to get out of bed with assistance of PT.  Complains of SOB and heavy legs.    5/24  remains npo.  sob yesterday with lasix 10 mg iv given.  pressor requirements decreasing.      48 y/o WM with no significant PMHX. presents after being in [facility] for 2 days and noted with suprapubic pain with assoicated poor PO intake.  No N/V/D until yesterday with vomiting.  No NSAID use PTA.   No abx use.      Admitted 3 days ago with Divertricular perforation.  Pt was given abx and IVF.  Pt did not respond to conservative measures and yesterday with rising WBC and Cr, pt  was taken to OR for ileocolic r resection with Eliud's procedure.    Since out of PACU, pt has received 11L of NS or LR./  UOP has remained minimal post-op with sbp in 90s.      Pt alert, awake and in moderate pain.  NG in place.      PAST MEDICAL & SURGICAL HISTORY:  Fatty liver  No significant past surgical history      MEDICATIONS  (STANDING):  pantoprazole  Injectable 40milliGRAM(s) IV Push daily  metroNIDAZOLE  IVPB 500milliGRAM(s) IV Intermittent every 8 hours  heparin  Injectable 5000Unit(s) SubCutaneous every 8 hours  ALBUTerol/ipratropium for Nebulization 3milliLiter(s) Nebulizer every 6 hours  fluconAZOLE IVPB  IV Intermittent   fluconAZOLE IVPB 100milliGRAM(s) IV Intermittent every 24 hours  norepinephrine Infusion 0.01MICROgram(s)/kG/Min IV Continuous <Continuous>  vasopressin Infusion 0.04Unit(s)/Min IV Continuous <Continuous>  sodium bicarbonate  Infusion 0.234mEq/kG/Hr IV Continuous <Continuous>  sodium chloride 0.45%. 1000milliLiter(s) IV Continuous <Continuous>  piperacillin/tazobactam IVPB. 3.375Gram(s) IV Intermittent every 8 hours  vancomycin  IVPB 750milliGRAM(s) IV Intermittent every 12 hours    MEDICATIONS  (PRN):  naloxone Injectable 0.1milliGRAM(s) IV Push every 3 minutes PRN For ANY of the following changes in patient status:  A. RR LESS THAN 10 breaths per minute, B. Oxygen saturation LESS THAN 90%, C. Sedation score of 6  ondansetron Injectable 4milliGRAM(s) IV Push every 6 hours PRN Nausea  ondansetron Injectable 4milliGRAM(s) IV Push every 6 hours PRN Nausea  acetaminophen  Suppository 650milliGRAM(s) Rectal every 6 hours PRN For Temp greater than 38.5 C (101.3 F)  morphine  - Injectable 2milliGRAM(s) IV Push every 4 hours PRN Moderate Pain (4 - 6)  LORazepam   Injectable 1milliGRAM(s) IntraMuscular every 6 hours PRN Anxiety          Vital Signs Last 24 Hrs  T(C): 36.2, Max: 36.2 (05-24 @ 21:00)  T(F): 97.1, Max: 97.1 (05-24 @ 21:00)  HR: 66 (54 - 83)  BP: 111/58 (98/70 - 133/65)  BP(mean): 69 (65 - 90)  RR: 17 (0 - 23)  SpO2: 96% (96% - 100%)  Daily     Daily   I & Os for 24h ending 05-25 @ 07:00  =============================================  IN: 3927.3 ml / OUT: 4755 ml / NET: -827.7 ml    I & Os for current day (as of 05-25 @ 10:55)  =============================================  IN: 359.6 ml / OUT: 100 ml / NET: 259.6 ml      PHYSICAL EXAM:  Alert and approriate  GENERAL: No acute distress  CHEST/LUNG: clear to aus  HEART: S1S2 Tachy  ABDOMEN: soft  EXTREMITIES: 3+ edema up to abdomen.  SKIN:     LABS:                        12.7   17.0  )-----------( 132      ( 25 May 2017 06:40 )             38.0     05-25    145  |  112<H>  |  26<H>  ----------------------------<  113<H>  4.1   |  27  |  1.07    Ca    6.8<L>      25 May 2017 06:40  Phos  1.9     05-25  Mg     2.4     05-25          Magnesium, Serum: 2.4 mg/dL (05-25 @ 06:40)  Phosphorus Level, Serum: 1.9 mg/dL (05-25 @ 06:40)          RADIOLOGY & ADDITIONAL TESTS:

## 2017-05-25 NOTE — PROGRESS NOTE ADULT - ASSESSMENT
47M with no significant past medical history admitted on 5/19 for evaluation of lower abdominal pain, decreased appetite and upon admission was found to have sigmoid diverticulitis; patient was medically managed however, on 5/21 patient underwent sigmoid resection, ileocolostomy and currently is post op asking for ice chips. He has decreased urine output and worsening renal function as well as hypotension  1. Post op leukocytosis, hypotension most likely secondary to peritonitis  - now found to have gram positive cocci in clusters in blood culture, possible staph  -started on vancomycin 750 mg iv q 12 hours, given large surgical incision  -will have peripheral iv access placed and central line removed once intensivist feels blood pressure is stable  - day #7 zosyn and flagyl, day #4 diflucan  - tolerating antibiotics without rashes or side effects   - iv hydration and supportive care   -pressors now off  - renal evaluation in progress  - wound care per surgery  -discussed with surgery, renal, and intensivist and nursing care at bedside  Will follow

## 2017-05-25 NOTE — PROGRESS NOTE ADULT - SUBJECTIVE AND OBJECTIVE BOX
CC: SOB    HPI:  47M admitted for a 24hr h/o progressive abdominal pain that began after a 1-2 day trip to [facility]. The pain is localized to the suprapubic location, associated with bladder pressure and decreased po intake. No nausea, fevers/chills. Cont passing flatus and reports stools are somewhat diarrheal. WBC 18, CT with acute sigmoid diverticulitis, no drainable abscess or fluid collection, though small amounts of free fluid adjacent to appendix. (19 May 2017 18:07)    5/25 - Events noted. Requiring less pressor requirements. Feels better.      PAST MEDICAL & SURGICAL HISTORY:  Fatty liver  No significant past surgical history      FAMILY HISTORY:  No pertinent family history in first degree relatives      Social Hx:    Allergies    No Known Allergies    Intolerances        47y        ICU Vital Signs Last 24 Hrs  T(C): 36.2, Max: 36.2 (05-24 @ 21:00)  T(F): 97.1, Max: 97.1 (05-24 @ 21:00)  HR: 66 (54 - 83)  BP: 111/58 (98/70 - 133/65)  BP(mean): 69 (65 - 90)  ABP: --  ABP(mean): --  RR: 17 (0 - 23)  SpO2: 96% (96% - 100%)          I&O's Summary  I & Os for 24h ending 25 May 2017 07:00  =============================================  IN: 4002.3 ml / OUT: 4755 ml / NET: -752.7 ml    I & Os for current day (as of 25 May 2017 11:53)  =============================================  IN: 659.6 ml / OUT: 100 ml / NET: 559.6 ml                            12.7   17.0  )-----------( 132      ( 25 May 2017 06:40 )             38.0       05-25    145  |  112<H>  |  26<H>  ----------------------------<  113<H>  4.1   |  27  |  1.07    Ca    6.8<L>      25 May 2017 06:40  Phos  1.9     05-25  Mg     2.4     05-25        CAPILLARY BLOOD GLUCOSE                            MEDICATIONS  (STANDING):  pantoprazole  Injectable 40milliGRAM(s) IV Push daily  metroNIDAZOLE  IVPB 500milliGRAM(s) IV Intermittent every 8 hours  heparin  Injectable 5000Unit(s) SubCutaneous every 8 hours  ALBUTerol/ipratropium for Nebulization 3milliLiter(s) Nebulizer every 6 hours  fluconAZOLE IVPB  IV Intermittent   fluconAZOLE IVPB 100milliGRAM(s) IV Intermittent every 24 hours  norepinephrine Infusion 0.01MICROgram(s)/kG/Min IV Continuous <Continuous>  vasopressin Infusion 0.04Unit(s)/Min IV Continuous <Continuous>  piperacillin/tazobactam IVPB. 3.375Gram(s) IV Intermittent every 8 hours  vancomycin  IVPB 750milliGRAM(s) IV Intermittent every 12 hours  furosemide   Injectable 20milliGRAM(s) IV Push every 12 hours    MEDICATIONS  (PRN):  naloxone Injectable 0.1milliGRAM(s) IV Push every 3 minutes PRN For ANY of the following changes in patient status:  A. RR LESS THAN 10 breaths per minute, B. Oxygen saturation LESS THAN 90%, C. Sedation score of 6  ondansetron Injectable 4milliGRAM(s) IV Push every 6 hours PRN Nausea  ondansetron Injectable 4milliGRAM(s) IV Push every 6 hours PRN Nausea  acetaminophen  Suppository 650milliGRAM(s) Rectal every 6 hours PRN For Temp greater than 38.5 C (101.3 F)  morphine  - Injectable 2milliGRAM(s) IV Push every 4 hours PRN Moderate Pain (4 - 6)  LORazepam   Injectable 1milliGRAM(s) IntraMuscular every 6 hours PRN Anxiety          DVT Prophylaxis: Hep SQ    Advanced Directives:  Discussed with:    Visit Information:    45-min CC Time is exclusive of billed procedures and/or teaching and/or routine family updates.

## 2017-05-25 NOTE — PROGRESS NOTE ADULT - SUBJECTIVE AND OBJECTIVE BOX
Has no complaints this morning. Positive blood cultures noted overnight- GPC. Making large amount of urine and pressor requirements decreasing. Denies pain    Exam:  Vital Signs Last 24 Hrs  T(C): 36.2, Max: 36.8 (05-24 @ 09:00)  T(F): 97.1, Max: 98.3 (05-24 @ 09:00)  HR: 57 (54 - 83)  BP: 119/62 (98/70 - 133/65)  BP(mean): 75 (71 - 90)  RR: 17 (0 - 23)  SpO2: 100% (97% - 100%)      General: in no distress, anasarca   Respiratory: non labored  Heart: regular rate and rhythm  Abdomen: distended, non tender, colostomy without output, incision intact with serous drainage- no sign of infection.  LUIS M drain with SS output  Neuro: alert and oriented x 3                          12.7   17.0  )-----------( 132      ( 25 May 2017 06:40 )             38.0   05-25    145  |  112<H>  |  26<H>  ----------------------------<  113<H>  4.1   |  27  |  1.07    Ca    6.8<L>      25 May 2017 06:40  Phos  1.9     05-25  Mg     2.4     05-25

## 2017-05-25 NOTE — PROGRESS NOTE ADULT - ASSESSMENT
# HARDIK/ ATN with minimal UOP  # Metabolic acidosis  # S/p ileocolic resection for perforated signmoid    PLAN  - NS at 200ml/hr for next 6 hrs and then maintain NS at 150 ml/hr to maintain MAP at 60s. May require pressors if unable to maintain MAP with this regimen.     - lactate and repeat BMP   - strict 1/0    - dose meds for crcl of less than 10 ml/min ( zosyn)      5/23  sepsis, peritonitis  hypotension, on 2 pressors  lactic acidosis resolving  resp compensation  good uo  cont abx as outlined  d/w surgery, intensivist    5/24 MK  - HARDIK/ATN, non oliguric and improving.  remains significantly fluid overloaded.  Will give trial of lasix and moniter response.    dw dr tomas and RN.  pt updated.      5/25 SY  --HARDIK resoleved.  --Hemodynamics improved  --Post volume resuscitation  --now with anasarca.  Will need to attempt diuresis due to pt's discomfort  and inability to move.   Trial of SPA and IV lasix.

## 2017-05-25 NOTE — CHART NOTE - NSCHARTNOTEFT_GEN_A_CORE
Upon Nutritional Assessment by the Registered Dietitian your patient was determined to meet criteria has evidence of the following diagnosis/diagnoses:        [ ]  Mild Protein Calorie Malnutrition        [x] Moderate Protein Calorie Malnutrition Acute illness or Injury        [ ] Severe Protein Calorie Malnutrition        [ ] Unspecified Protein Calorie Malnutrition    Findings as based on:  •  Comprehensive nutrition assessment and Nutrition focused physical examination  •  Insufficient food/energy intake  •  Weight loss over time(Please specify time period)  •  Loss of muscle mass  •  Loss of fat mass  •  Fluid accumulation    Findings relevant to patient:  1. Poor PO intake >/= 7 days  2. +4 scrotum edema  3    Nutrition Interventions:  1. Advance diet when medically feasible  2.  3.    TO BE COMPLETED BY PROVIDER:          [ ] Agree:         [ ] Disagree:    Comments:

## 2017-05-25 NOTE — DIETITIAN INITIAL EVALUATION ADULT. - OTHER INFO
Pt seen for length of stay. Pt NPO for 7 days. Pt with +4 scrotum Edema. Pt reports appetite was good prior to admission.

## 2017-05-25 NOTE — PROGRESS NOTE ADULT - ASSESSMENT
48yo M with:  Septic Shock - On low dose pressors  s/p resection of sigmoid diverticulitis with abscess on 5/21  HARDIK due to ATN - Improved  metabolic acidosis    Plan:  Cont ICU Care  Serial Neuro Exams  Septic Shock - Off pressors  Keep MAP > 65  NPO per surgery  Cont 1/2 NS @ 75cc/hr  Monitor renal function - improving  Strict I/O's - Good output   IV Zosyn / Diflucan per   DVT prophylaxis - Hep SQ  I have spoken to patient, his wife, on the phone regarding patient's current status, plan of care, and prognosis with all questions answered in detail.  Case d/w CRS team and Dr. Ruff

## 2017-05-25 NOTE — PROGRESS NOTE ADULT - SUBJECTIVE AND OBJECTIVE BOX
Subjective:    pat is better, coming down on pressors.    MEDICATIONS  (STANDING):  pantoprazole  Injectable 40milliGRAM(s) IV Push daily  metroNIDAZOLE  IVPB 500milliGRAM(s) IV Intermittent every 8 hours  heparin  Injectable 5000Unit(s) SubCutaneous every 8 hours  ALBUTerol/ipratropium for Nebulization 3milliLiter(s) Nebulizer every 6 hours  fluconAZOLE IVPB  IV Intermittent   fluconAZOLE IVPB 100milliGRAM(s) IV Intermittent every 24 hours  norepinephrine Infusion 0.01MICROgram(s)/kG/Min IV Continuous <Continuous>  vasopressin Infusion 0.04Unit(s)/Min IV Continuous <Continuous>  sodium bicarbonate  Infusion 0.234mEq/kG/Hr IV Continuous <Continuous>  sodium chloride 0.45%. 1000milliLiter(s) IV Continuous <Continuous>  piperacillin/tazobactam IVPB. 3.375Gram(s) IV Intermittent every 8 hours  calcium gluconate IVPB 2Gram(s) IV Intermittent once  potassium phosphate IVPB 15milliMole(s) IV Intermittent once    MEDICATIONS  (PRN):  naloxone Injectable 0.1milliGRAM(s) IV Push every 3 minutes PRN For ANY of the following changes in patient status:  A. RR LESS THAN 10 breaths per minute, B. Oxygen saturation LESS THAN 90%, C. Sedation score of 6  ondansetron Injectable 4milliGRAM(s) IV Push every 6 hours PRN Nausea  ondansetron Injectable 4milliGRAM(s) IV Push every 6 hours PRN Nausea  acetaminophen  Suppository 650milliGRAM(s) Rectal every 6 hours PRN For Temp greater than 38.5 C (101.3 F)  morphine  - Injectable 2milliGRAM(s) IV Push every 4 hours PRN Moderate Pain (4 - 6)  LORazepam   Injectable 1milliGRAM(s) IntraMuscular every 6 hours PRN Anxiety      Allergies    No Known Allergies    Intolerances        Vital Signs Last 24 Hrs  T(C): 36.2, Max: 36.2 (05-24 @ 21:00)  T(F): 97.1, Max: 97.1 (05-24 @ 21:00)  HR: 58 (54 - 83)  BP: 98/71 (98/70 - 133/65)  BP(mean): 76 (71 - 90)  RR: 22 (0 - 23)  SpO2: 100% (97% - 100%)    PHYSICAL EXAMINATION:    NECK:  Supple. No lymphadenopathy. Jugular venous pressure not elevated. Carotids equal.   HEART:   The cardiac impulse has a normal quality. Reg., Nl S1 and S2.  There are no murmurs, rubs or gallops noted  CHEST:  Chest is clear to auscultation. Normal respiratory effort.  ABDOMEN:  Soft and nontender.   EXTREMITIES:  There is no edema.       LABS:                        12.7   17.0  )-----------( 132      ( 25 May 2017 06:40 )             38.0     05-25    145  |  112<H>  |  26<H>  ----------------------------<  113<H>  4.1   |  27  |  1.07    Ca    6.8<L>      25 May 2017 06:40  Phos  1.9     05-25  Mg     2.4     05-25

## 2017-05-25 NOTE — DIETITIAN INITIAL EVALUATION ADULT. - ADHERENCE
n/a/Pt dose not follow a therapeutic diet at home. Pt reports he wanted to make changes to diet and had been trying to eat healthier

## 2017-05-26 LAB
ALBUMIN SERPL ELPH-MCNC: 1.1 G/DL — LOW (ref 3.3–5)
ANION GAP SERPL CALC-SCNC: 7 MMOL/L — SIGNIFICANT CHANGE UP (ref 5–17)
BUN SERPL-MCNC: 28 MG/DL — HIGH (ref 7–23)
CALCIUM SERPL-MCNC: 7.1 MG/DL — LOW (ref 8.5–10.1)
CHLORIDE SERPL-SCNC: 111 MMOL/L — HIGH (ref 96–108)
CO2 SERPL-SCNC: 28 MMOL/L — SIGNIFICANT CHANGE UP (ref 22–31)
CREAT SERPL-MCNC: 1.05 MG/DL — SIGNIFICANT CHANGE UP (ref 0.5–1.3)
GLUCOSE SERPL-MCNC: 96 MG/DL — SIGNIFICANT CHANGE UP (ref 70–99)
HCT VFR BLD CALC: 37.6 % — LOW (ref 39–50)
HGB BLD-MCNC: 12.7 G/DL — LOW (ref 13–17)
MAGNESIUM SERPL-MCNC: 2.6 MG/DL — SIGNIFICANT CHANGE UP (ref 1.6–2.6)
MCHC RBC-ENTMCNC: 29.7 PG — SIGNIFICANT CHANGE UP (ref 27–34)
MCHC RBC-ENTMCNC: 33.7 GM/DL — SIGNIFICANT CHANGE UP (ref 32–36)
MCV RBC AUTO: 88.3 FL — SIGNIFICANT CHANGE UP (ref 80–100)
PHOSPHATE SERPL-MCNC: 2.4 MG/DL — LOW (ref 2.5–4.5)
PLATELET # BLD AUTO: 147 K/UL — LOW (ref 150–400)
POTASSIUM SERPL-MCNC: 4.3 MMOL/L — SIGNIFICANT CHANGE UP (ref 3.5–5.3)
POTASSIUM SERPL-SCNC: 4.3 MMOL/L — SIGNIFICANT CHANGE UP (ref 3.5–5.3)
RBC # BLD: 4.26 M/UL — SIGNIFICANT CHANGE UP (ref 4.2–5.8)
RBC # FLD: 12.5 % — SIGNIFICANT CHANGE UP (ref 10.3–14.5)
SODIUM SERPL-SCNC: 146 MMOL/L — HIGH (ref 135–145)
WBC # BLD: 15.8 K/UL — HIGH (ref 3.8–10.5)
WBC # FLD AUTO: 15.8 K/UL — HIGH (ref 3.8–10.5)

## 2017-05-26 RX ADMIN — PIPERACILLIN AND TAZOBACTAM 25 GRAM(S): 4; .5 INJECTION, POWDER, LYOPHILIZED, FOR SOLUTION INTRAVENOUS at 06:51

## 2017-05-26 RX ADMIN — Medication 100 MILLIGRAM(S): at 12:58

## 2017-05-26 RX ADMIN — HEPARIN SODIUM 5000 UNIT(S): 5000 INJECTION INTRAVENOUS; SUBCUTANEOUS at 21:41

## 2017-05-26 RX ADMIN — Medication 150 MILLIGRAM(S): at 20:29

## 2017-05-26 RX ADMIN — PANTOPRAZOLE SODIUM 40 MILLIGRAM(S): 20 TABLET, DELAYED RELEASE ORAL at 11:25

## 2017-05-26 RX ADMIN — Medication 100 MILLIGRAM(S): at 05:03

## 2017-05-26 RX ADMIN — Medication 3 MILLILITER(S): at 08:50

## 2017-05-26 RX ADMIN — PIPERACILLIN AND TAZOBACTAM 25 GRAM(S): 4; .5 INJECTION, POWDER, LYOPHILIZED, FOR SOLUTION INTRAVENOUS at 16:21

## 2017-05-26 RX ADMIN — Medication 20 MILLIGRAM(S): at 05:13

## 2017-05-26 RX ADMIN — FLUCONAZOLE 50 MILLIGRAM(S): 150 TABLET ORAL at 15:01

## 2017-05-26 RX ADMIN — PIPERACILLIN AND TAZOBACTAM 25 GRAM(S): 4; .5 INJECTION, POWDER, LYOPHILIZED, FOR SOLUTION INTRAVENOUS at 21:41

## 2017-05-26 RX ADMIN — HEPARIN SODIUM 5000 UNIT(S): 5000 INJECTION INTRAVENOUS; SUBCUTANEOUS at 15:00

## 2017-05-26 RX ADMIN — Medication 150 MILLIGRAM(S): at 05:03

## 2017-05-26 RX ADMIN — HEPARIN SODIUM 5000 UNIT(S): 5000 INJECTION INTRAVENOUS; SUBCUTANEOUS at 05:13

## 2017-05-26 NOTE — PROGRESS NOTE ADULT - ASSESSMENT
47M with no significant past medical history admitted on 5/19 for evaluation of lower abdominal pain, decreased appetite and upon admission was found to have sigmoid diverticulitis; patient was medically managed however, on 5/21 patient underwent sigmoid resection, ileocolostomy and currently is post op asking for ice chips. He has decreased urine output and worsening renal function as well as hypotension  1. Post op leukocytosis, hypotension most likely secondary to peritonitis  - coagulase negative staph in blood, post surgery  -started on vancomycin 750 mg iv q 12 hours, given large surgical incision, will continue, day #2  -central line removed  - day #8 zosyn and flagyl, day #5 diflucan  - will stop flagyl today  - tolerating antibiotics without rashes or side effects   - iv hydration and supportive care   -pressors now off  - slow improving  - wound care per surgery  -discussed with surgery, renal, and intensivist and nursing care at bedside  Will follow

## 2017-05-26 NOTE — PROGRESS NOTE ADULT - ASSESSMENT
48yo M with:  Septic Shock - Off pressors  s/p resection of sigmoid diverticulitis with abscess on 5/21  HARDIK due to ATN - Resolved   metabolic acidosis - Resolved    Plan:  Cont ICU Care  Serial Neuro Exams  Septic Shock - Off pressors  NPO per surgery  May need TPN in next few days  Cont 1/2 NS @ 75cc/hr  Monitor renal function - improving  Strict I/O's - Good output   IV Zosyn / Diflucan per   DVT prophylaxis - Hep SQ  I have spoken to patient, and his wife, on the phone regarding patient's current status, plan of care, and prognosis with all questions answered in detail.  Case d/w CRS team and ID and Nephrology team

## 2017-05-26 NOTE — PHYSICAL THERAPY INITIAL EVALUATION ADULT - PERTINENT HX OF CURRENT PROBLEM, REHAB EVAL
47M with no significant past medical history admitted on 5/19 for evaluation of lower abdominal pain, decreased appetite and upon admission was found to have sigmoid diverticulitis; patient was medically managed however, on 5/21 patient underwent sigmoid resection, ileocolostomy

## 2017-05-26 NOTE — PROGRESS NOTE ADULT - SUBJECTIVE AND OBJECTIVE BOX
Subjective:      MEDICATIONS  (STANDING):  pantoprazole  Injectable 40milliGRAM(s) IV Push daily  metroNIDAZOLE  IVPB 500milliGRAM(s) IV Intermittent every 8 hours  heparin  Injectable 5000Unit(s) SubCutaneous every 8 hours  ALBUTerol/ipratropium for Nebulization 3milliLiter(s) Nebulizer every 6 hours  fluconAZOLE IVPB  IV Intermittent   fluconAZOLE IVPB 100milliGRAM(s) IV Intermittent every 24 hours  piperacillin/tazobactam IVPB. 3.375Gram(s) IV Intermittent every 8 hours  vancomycin  IVPB 750milliGRAM(s) IV Intermittent every 12 hours  furosemide   Injectable 20milliGRAM(s) IV Push daily    MEDICATIONS  (PRN):  naloxone Injectable 0.1milliGRAM(s) IV Push every 3 minutes PRN For ANY of the following changes in patient status:  A. RR LESS THAN 10 breaths per minute, B. Oxygen saturation LESS THAN 90%, C. Sedation score of 6  ondansetron Injectable 4milliGRAM(s) IV Push every 6 hours PRN Nausea  ondansetron Injectable 4milliGRAM(s) IV Push every 6 hours PRN Nausea  acetaminophen  Suppository 650milliGRAM(s) Rectal every 6 hours PRN For Temp greater than 38.5 C (101.3 F)  morphine  - Injectable 2milliGRAM(s) IV Push every 4 hours PRN Moderate Pain (4 - 6)      Allergies    No Known Allergies    Intolerances        Vital Signs Last 24 Hrs  T(C): 36.3, Max: 36.3 (05-25 @ 21:00)  T(F): 97.3, Max: 97.4 (05-25 @ 21:00)  HR: 59 (59 - 78)  BP: 113/62 (95/53 - 120/58)  BP(mean): 74 (64 - 85)  RR: 0 (0 - 23)  SpO2: 99% (96% - 100%)    PHYSICAL EXAMINATION:    NECK:  Supple. No lymphadenopathy. Jugular venous pressure not elevated. Carotids equal.   HEART:   The cardiac impulse has a normal quality. Reg., Nl S1 and S2.  There are no murmurs, rubs or gallops noted  CHEST:  Chest is clear to auscultation. Normal respiratory effort.  ABDOMEN:  Soft and nontender.   EXTREMITIES:  There is no edema.       LABS:                        12.7   15.8  )-----------( 147      ( 26 May 2017 05:30 )             37.6     05-26    146<H>  |  111<H>  |  28<H>  ----------------------------<  96  4.3   |  28  |  1.05    Ca    7.1<L>      26 May 2017 05:30  Phos  2.4     05-26  Mg     2.6     05-26    TPro  x   /  Alb  1.1<L>  /  TBili  x   /  DBili  x   /  AST  x   /  ALT  x   /  AlkPhos  x   05-26 Subjective:    pat better, off pressors, breathing better.    MEDICATIONS  (STANDING):  pantoprazole  Injectable 40milliGRAM(s) IV Push daily  metroNIDAZOLE  IVPB 500milliGRAM(s) IV Intermittent every 8 hours  heparin  Injectable 5000Unit(s) SubCutaneous every 8 hours  ALBUTerol/ipratropium for Nebulization 3milliLiter(s) Nebulizer every 6 hours  fluconAZOLE IVPB  IV Intermittent   fluconAZOLE IVPB 100milliGRAM(s) IV Intermittent every 24 hours  piperacillin/tazobactam IVPB. 3.375Gram(s) IV Intermittent every 8 hours  vancomycin  IVPB 750milliGRAM(s) IV Intermittent every 12 hours  furosemide   Injectable 20milliGRAM(s) IV Push daily    MEDICATIONS  (PRN):  naloxone Injectable 0.1milliGRAM(s) IV Push every 3 minutes PRN For ANY of the following changes in patient status:  A. RR LESS THAN 10 breaths per minute, B. Oxygen saturation LESS THAN 90%, C. Sedation score of 6  ondansetron Injectable 4milliGRAM(s) IV Push every 6 hours PRN Nausea  ondansetron Injectable 4milliGRAM(s) IV Push every 6 hours PRN Nausea  acetaminophen  Suppository 650milliGRAM(s) Rectal every 6 hours PRN For Temp greater than 38.5 C (101.3 F)  morphine  - Injectable 2milliGRAM(s) IV Push every 4 hours PRN Moderate Pain (4 - 6)      Allergies    No Known Allergies    Intolerances        Vital Signs Last 24 Hrs  T(C): 36.3, Max: 36.3 (05-25 @ 21:00)  T(F): 97.3, Max: 97.4 (05-25 @ 21:00)  HR: 59 (59 - 78)  BP: 113/62 (95/53 - 120/58)  BP(mean): 74 (64 - 85)  RR: 0 (0 - 23)  SpO2: 99% (96% - 100%)    PHYSICAL EXAMINATION:    NECK:  Supple. No lymphadenopathy. Jugular venous pressure not elevated. Carotids equal.   HEART:   The cardiac impulse has a normal quality. Reg., Nl S1 and S2.  There are no murmurs, rubs or gallops noted  CHEST:  Chest crackles to auscultation. Normal respiratory effort.  ABDOMEN:  Soft and nontender.   EXTREMITIES:  There is no edema.       LABS:                        12.7   15.8  )-----------( 147      ( 26 May 2017 05:30 )             37.6     05-26    146<H>  |  111<H>  |  28<H>  ----------------------------<  96  4.3   |  28  |  1.05    Ca    7.1<L>      26 May 2017 05:30  Phos  2.4     05-26  Mg     2.6     05-26    TPro  x   /  Alb  1.1<L>  /  TBili  x   /  DBili  x   /  AST  x   /  ALT  x   /  AlkPhos  x   05-26

## 2017-05-26 NOTE — PROGRESS NOTE ADULT - SUBJECTIVE AND OBJECTIVE BOX
CC: Weakness    HPI:  47M admitted for a 24hr h/o progressive abdominal pain that began after a 1-2 day trip to [place]. The pain is localized to the suprapubic location, associated with bladder pressure and decreased po intake. No nausea, fevers/chills. Cont passing flatus and reports stools are somewhat diarrheal. WBC 18, CT with acute sigmoid diverticulitis, no drainable abscess or fluid collection, though small amounts of free fluid adjacent to appendix. (19 May 2017 18:07)    5/26 - Patient seen and examined. Events noted. Feeling better today. No CP or SOB.      PAST MEDICAL & SURGICAL HISTORY:  Fatty liver  No significant past surgical history      FAMILY HISTORY:  No pertinent family history in first degree relatives      Social Hx:    Allergies    No Known Allergies    Intolerances        47y        ICU Vital Signs Last 24 Hrs  T(C): 36.5, Max: 36.5 (05-26 @ 09:00)  T(F): 97.7, Max: 97.7 (05-26 @ 09:00)  HR: 64 (59 - 75)  BP: 115/58 (95/53 - 121/62)  BP(mean): 70 (64 - 85)  ABP: --  ABP(mean): --  RR: 15 (0 - 23)  SpO2: 99% (98% - 100%)          I&O's Summary    I & Os for current day (as of 26 May 2017 13:39)  =============================================  IN: 1359.6 ml / OUT: 3955 ml / NET: -2595.4 ml                            12.7   15.8  )-----------( 147      ( 26 May 2017 05:30 )             37.6       05-26    146<H>  |  111<H>  |  28<H>  ----------------------------<  96  4.3   |  28  |  1.05    Ca    7.1<L>      26 May 2017 05:30  Phos  2.4     05-26  Mg     2.6     05-26    TPro  x   /  Alb  1.1<L>  /  TBili  x   /  DBili  x   /  AST  x   /  ALT  x   /  AlkPhos  x   05-26      CAPILLARY BLOOD GLUCOSE      LIVER FUNCTIONS - ( 26 May 2017 05:30 )  Alb: 1.1 g/dL / Pro: x     / ALK PHOS: x     / ALT: x     / AST: x     / GGT: x                               MEDICATIONS  (STANDING):  pantoprazole  Injectable 40milliGRAM(s) IV Push daily  metroNIDAZOLE  IVPB 500milliGRAM(s) IV Intermittent every 8 hours  heparin  Injectable 5000Unit(s) SubCutaneous every 8 hours  ALBUTerol/ipratropium for Nebulization 3milliLiter(s) Nebulizer every 6 hours  fluconAZOLE IVPB  IV Intermittent   fluconAZOLE IVPB 100milliGRAM(s) IV Intermittent every 24 hours  piperacillin/tazobactam IVPB. 3.375Gram(s) IV Intermittent every 8 hours  vancomycin  IVPB 750milliGRAM(s) IV Intermittent every 12 hours  furosemide   Injectable 20milliGRAM(s) IV Push daily    MEDICATIONS  (PRN):  naloxone Injectable 0.1milliGRAM(s) IV Push every 3 minutes PRN For ANY of the following changes in patient status:  A. RR LESS THAN 10 breaths per minute, B. Oxygen saturation LESS THAN 90%, C. Sedation score of 6  ondansetron Injectable 4milliGRAM(s) IV Push every 6 hours PRN Nausea  ondansetron Injectable 4milliGRAM(s) IV Push every 6 hours PRN Nausea  acetaminophen  Suppository 650milliGRAM(s) Rectal every 6 hours PRN For Temp greater than 38.5 C (101.3 F)  morphine  - Injectable 2milliGRAM(s) IV Push every 4 hours PRN Moderate Pain (4 - 6)          DVT Prophylaxis: Hep SQ    Advanced Directives:  Discussed with:    Visit Information:    ** Time is exclusive of billed procedures and/or teaching and/or routine family updates.

## 2017-05-26 NOTE — PROGRESS NOTE ADULT - SUBJECTIVE AND OBJECTIVE BOX
NEPHROLOGY INTERVAL HPI/OVERNIGHT EVENTS:  5/25 SY  No acute events.  BP stable off pressor.  Attempting to get out of bed with assistance of PT.  Complains of SOB and heavy legs.    5/24  remains npo.  sob yesterday with lasix 10 mg iv given.  pressor requirements decreasing.      46 y/o WM with no significant PMHX. presents after being in [place] for 2 days and noted with suprapubic pain with assoicated poor PO intake.  No N/V/D until yesterday with vomiting.  No NSAID use PTA.   No abx use.      Admitted 3 days ago with Divertricular perforation.  Pt was given abx and IVF.  Pt did not respond to conservative measures and yesterday with rising WBC and Cr, pt  was taken to OR for ileocolic r resection with Eliud's procedure.    Since out of PACU, pt has received 11L of NS or LR./  UOP has remained minimal post-op with sbp in 90s.      Pt alert, awake and in moderate pain.  NG in place.      5/26  OOB in chair  feels well   no new events   creat 1  no new events  got lasix this am , excellent UO    PAST MEDICAL & SURGICAL HISTORY:  Fatty liver  No significant past surgical history      MEDICATIONS  (STANDING):  pantoprazole  Injectable 40milliGRAM(s) IV Push daily  metroNIDAZOLE  IVPB 500milliGRAM(s) IV Intermittent every 8 hours  heparin  Injectable 5000Unit(s) SubCutaneous every 8 hours  ALBUTerol/ipratropium for Nebulization 3milliLiter(s) Nebulizer every 6 hours  fluconAZOLE IVPB  IV Intermittent   fluconAZOLE IVPB 100milliGRAM(s) IV Intermittent every 24 hours  norepinephrine Infusion 0.01MICROgram(s)/kG/Min IV Continuous <Continuous>  vasopressin Infusion 0.04Unit(s)/Min IV Continuous <Continuous>  sodium bicarbonate  Infusion 0.234mEq/kG/Hr IV Continuous <Continuous>  sodium chloride 0.45%. 1000milliLiter(s) IV Continuous <Continuous>  piperacillin/tazobactam IVPB. 3.375Gram(s) IV Intermittent every 8 hours  vancomycin  IVPB 750milliGRAM(s) IV Intermittent every 12 hours    MEDICATIONS  (PRN):  naloxone Injectable 0.1milliGRAM(s) IV Push every 3 minutes PRN For ANY of the following changes in patient status:  A. RR LESS THAN 10 breaths per minute, B. Oxygen saturation LESS THAN 90%, C. Sedation score of 6  ondansetron Injectable 4milliGRAM(s) IV Push every 6 hours PRN Nausea  ondansetron Injectable 4milliGRAM(s) IV Push every 6 hours PRN Nausea  acetaminophen  Suppository 650milliGRAM(s) Rectal every 6 hours PRN For Temp greater than 38.5 C (101.3 F)  morphine  - Injectable 2milliGRAM(s) IV Push every 4 hours PRN Moderate Pain (4 - 6)  LORazepam   Injectable 1milliGRAM(s) IntraMuscular every 6 hours PRN Anxiety        ICU Vital Signs Last 24 Hrs  T(C): 36.5, Max: 36.5 (05-26 @ 09:00)  T(F): 97.7, Max: 97.7 (05-26 @ 09:00)  HR: 64 (59 - 75)  BP: 115/58 (95/53 - 121/62)  BP(mean): 70 (64 - 85)  ABP: --  ABP(mean): --  RR: 15 (0 - 23)  SpO2: 99% (98% - 100%)    =============================================  IN: 3927.3 ml / OUT: 4755 ml / NET: -827.7 ml    I & Os for current day (as of 05-25 @ 10:55)  =============================================  IN: 359.6 ml / OUT: 100 ml / NET: 259.6 ml      PHYSICAL EXAM:  Alert and approriate  GENERAL: No acute distress  CHEST/LUNG: clear to aus  HEART: S1S2 Tachy  ABDOMEN: soft  EXTREMITIES: 3+ edema up to abdomen.  SKIN:     LABS:                        12.7   15.8  )-----------( 147      ( 26 May 2017 05:30 )             37.6     05-26    146<H>  |  111<H>  |  28<H>  ----------------------------<  96  4.3   |  28  |  1.05    Ca    7.1<L>      26 May 2017 05:30  Phos  2.4     05-26  Mg     2.6     05-26    TPro  x   /  Alb  1.1<L>  /  TBili  x   /  DBili  x   /  AST  x   /  ALT  x   /  AlkPhos  x   05-26

## 2017-05-26 NOTE — PROGRESS NOTE ADULT - SUBJECTIVE AND OBJECTIVE BOX
Stable. Off pressors. Diuresing well. No bowel function. Still has high NG output but drinking ice chips    Exam:  Vital Signs Last 24 Hrs  T(C): 36.3, Max: 36.3 (05-25 @ 21:00)  T(F): 97.3, Max: 97.4 (05-25 @ 21:00)  HR: 59 (59 - 78)  BP: 113/62 (95/53 - 120/58)  BP(mean): 74 (64 - 85)  RR: 0 (0 - 23)  SpO2: 99% (96% - 100%)      General: in no distress, anasarca   Respiratory: non labored  Heart: regular rate and rhythm  Abdomen: distended, non tender, colostomy without output, incision intact with serous drainage- no sign of infection.  LUIS M drain with SS output  Neuro: alert and oriented x 3                          12.7   15.8  )-----------( 147      ( 26 May 2017 05:30 )             37.6   05-26    146<H>  |  111<H>  |  28<H>  ----------------------------<  96  4.3   |  28  |  1.05    Ca    7.1<L>      26 May 2017 05:30  Phos  2.4     05-26  Mg     2.6     05-26    TPro  x   /  Alb  1.1<L>  /  TBili  x   /  DBili  x   /  AST  x   /  ALT  x   /  AlkPhos  x   05-26

## 2017-05-26 NOTE — PROGRESS NOTE ADULT - ASSESSMENT
# HARDIK/ ATN with minimal UOP  # Metabolic acidosis  # S/p ileocolic resection for perforated signmoid    PLAN  - NS at 200ml/hr for next 6 hrs and then maintain NS at 150 ml/hr to maintain MAP at 60s. May require pressors if unable to maintain MAP with this regimen.     - lactate and repeat BMP   - strict 1/0    - dose meds for crcl of less than 10 ml/min ( zosyn)      5/23  sepsis, peritonitis  hypotension, on 2 pressors  lactic acidosis resolving  resp compensation  good uo  cont abx as outlined  d/w surgery, intensivist    5/24 MK  - HARDIK/ATN, non oliguric and improving.  remains significantly fluid overloaded.  Will give trial of lasix and moniter response.    dw dr wing and RN.  pt updated.      5/25 SY  --HARDIK resoleved.  --Hemodynamics improved  --Post volume resuscitation  --now with anasarca.  Will need to attempt diuresis due to pt's discomfort  and inability to move.   Trial of SPA and IV lasix.      5/26  OOB in chair  feels well   no new events   creat 1  no new events  got lasix this am , excellent UO  d/w Dr Wing, we will sign off  Please reconsult prn

## 2017-05-26 NOTE — PROGRESS NOTE ADULT - ASSESSMENT
POD 5 s/p sigmoid resection and rutledge's for diverticulitis and ileocolic resection. Overall improving.      Remain NPO, NGT still had > 1o00 cc output  Antibiotics per ID  Continue diuresis  Okay to remove central line. Will likely need PICC line for nutrition as no bowel function yet.   Physical therapy consult  Cover midline incision prn

## 2017-05-26 NOTE — PHYSICAL THERAPY INITIAL EVALUATION ADULT - DIAGNOSIS, PT EVAL
POD 5 s/p sigmoid resection and rutledge's for diverticulitis and ileocolic resection. POD #5 s/p sigmoid resection and rutledge's for diverticulitis and ileocolic resection.

## 2017-05-26 NOTE — PHYSICAL THERAPY INITIAL EVALUATION ADULT - GENERAL OBSERVATIONS, REHAB EVAL
Pt rec'd sitting up in bedside chair, NG tube draining, pt reports no discomfort at rest, agreeable to consult

## 2017-05-26 NOTE — PHYSICAL THERAPY INITIAL EVALUATION ADULT - GAIT DEVIATIONS NOTED, PT EVAL
decreased supa/Pt presents with flexed posture and widened OJ with a waddling quality. Chair follow for safety./decreased step length/increased stride width

## 2017-05-26 NOTE — PROGRESS NOTE ADULT - SUBJECTIVE AND OBJECTIVE BOX
HPI:  47M with no significant past medical history admitted on 5/19 for evaluation of lower abdominal pain, decreased appetite and upon admission was found to have sigmoid diverticulitis; patient was medically managed however, on 5/21 patient underwent sigmoid resection, ileocolostomy and currently is post op asking for ice chips. He has decreased urine output and worsening renal function as well as hypotension.  Today 5/23 patient is on pressor support, intermittently febrile  Today 5/24 patient still on pressor support, however, doses coming down, started to have urine output and has been afebrile for greater than 24 hours  Today 5/25 patient now off pressors; sitting in chair, notes overall he is feeling better  Today 5/26 patient off pressors; ambulating earlier        MEDICATIONS  (STANDING):  pantoprazole  Injectable 40milliGRAM(s) IV Push daily  heparin  Injectable 5000Unit(s) SubCutaneous every 8 hours  ALBUTerol/ipratropium for Nebulization 3milliLiter(s) Nebulizer every 6 hours  fluconAZOLE IVPB  IV Intermittent   fluconAZOLE IVPB 100milliGRAM(s) IV Intermittent every 24 hours  piperacillin/tazobactam IVPB. 3.375Gram(s) IV Intermittent every 8 hours  vancomycin  IVPB 750milliGRAM(s) IV Intermittent every 12 hours  furosemide   Injectable 20milliGRAM(s) IV Push daily    MEDICATIONS  (PRN):  naloxone Injectable 0.1milliGRAM(s) IV Push every 3 minutes PRN For ANY of the following changes in patient status:  A. RR LESS THAN 10 breaths per minute, B. Oxygen saturation LESS THAN 90%, C. Sedation score of 6  ondansetron Injectable 4milliGRAM(s) IV Push every 6 hours PRN Nausea  ondansetron Injectable 4milliGRAM(s) IV Push every 6 hours PRN Nausea  acetaminophen  Suppository 650milliGRAM(s) Rectal every 6 hours PRN For Temp greater than 38.5 C (101.3 F)  morphine  - Injectable 2milliGRAM(s) IV Push every 4 hours PRN Moderate Pain (4 - 6)      Vital Signs Last 24 Hrs  T(C): 36.5, Max: 36.5 (05-26 @ 09:00)  T(F): 97.7, Max: 97.7 (05-26 @ 09:00)  HR: 64 (59 - 75)  BP: 115/58 (98/81 - 121/62)  BP(mean): 70 (64 - 85)  RR: 15 (0 - 23)  SpO2: 99% (98% - 100%)    Physical Exam:            Constitutional: frail looking  HEENT: NC/AT, EOMI, PERRLA, ngt in place  Neck: supple  Respiratory: clear  Cardiovascular: S1S2 regular, no murmurs  Abdomen: kimmy drain in place, dressing in place left sided ostomy  Genitourinary: deferred  Rectal: deferred  Musculoskeletal: no muscle tenderness, no joint swelling or tenderness  Neurological: AxOx3, moving all extremities, no focal deficits  Skin: no rashes, mild mottling of lower extremities            Labs:                        12.7   15.8  )-----------( 147      ( 26 May 2017 05:30 )             37.6     05-26    146<H>  |  111<H>  |  28<H>  ----------------------------<  96  4.3   |  28  |  1.05    Ca    7.1<L>      26 May 2017 05:30  Phos  2.4     05-26  Mg     2.6     05-26    TPro  x   /  Alb  1.1<L>  /  TBili  x   /  DBili  x   /  AST  x   /  ALT  x   /  AlkPhos  x   05-26           Cultures: Culture - Blood (05.23.17 @ 11:20)    Gram Stain:   Growth in anaerobic bottle: Gram Positive Cocci in Clusters    Specimen Source: .Blood Blood    Culture Results:   Growth in anaerobic bottle: Coag Negative Staphylococcus  Single set isolate, possible contaminant. Contact  Microbiology if susceptibility testing clinically  indicated.            Radiology:EXAM:  CT ABDOMEN AND PELVIS IC                            PROCEDURE DATE:  05/19/2017        INTERPRETATION:  CLINICAL INFORMATION: 47-year-old man with abdominal   pain assess for appendicitis     TECHNIQUE:    CT of abdomen and pelvis was performed with axial images   were obtained from the diaphragm to pubic symphysis oral and IV contrast.    COMPARISON:  None.    FINDINGS:    Liver: Borderline hepatomegaly with mild fatty infiltration otherwise   within normal limits  Gallbladder: Within normal limits  Bile ducts: No intrahepatic or extrahepatic biliary dilatation  Pancreas: within normal limits    Spleen: within normal limits  Adrenal: within normal limits  Kidneys, Ureters and Bladder: Symmetric enhancement bilaterally. No   perinephric attending or collections. No hydronephrosis. No intrarenal   calculi. Normal caliber of the ureters. Limited unopacified nondistended   bladder.    Pelvis: No pelvic adenopathy or pelvic free fluid.  Small fat-containing   bilateral inguinal hernias.      Bowel: No bowel obstruction.  There are few scattered colonic   diverticula. There is focal thickening of sigmoid colon in the pelvis   associated with mesenteric fat stranding and thickening of adjacent   fascial planes with localized perforation and small air bubbles without   abscess. Findings are consistent with acute rupture diverticulitis. Small   free fluid adjacent to gas filled appendix.    Peritoneum: Small ascites, no organized fluid collections.    Retroperitoneum: within normal limits  Vessels: Patent.    Abdominal wall: Small fat-containing umbilical hernia.    Lower chest and lung Bases: The visualized lung bases clear except for   subsegmental dependent atelectasis. Heart normal in size.   Bones: Degenerative changes.     IMPRESSION:     Focal thickening of sigmoid colon with mesenteric fat stranding and   thickening of fascial planes with scattered adjacent air bubbles   consistent with acute diverticulitis without abscess. Small free fluid in   the abdomen extending to the right quadrant.        Advanced directive addressed: full resuscitation

## 2017-05-26 NOTE — PHYSICAL THERAPY INITIAL EVALUATION ADULT - DID THE PATIENT HAVE SURGERY?
yes/hartmanns procedure and ileocolic resection for extensive perforated diverticultis with sepsis., with Dr. Annel Boss.

## 2017-05-27 LAB
ANION GAP SERPL CALC-SCNC: 5 MMOL/L — SIGNIFICANT CHANGE UP (ref 5–17)
BUN SERPL-MCNC: 28 MG/DL — HIGH (ref 7–23)
CALCIUM SERPL-MCNC: 7.2 MG/DL — LOW (ref 8.5–10.1)
CHLORIDE SERPL-SCNC: 113 MMOL/L — HIGH (ref 96–108)
CO2 SERPL-SCNC: 28 MMOL/L — SIGNIFICANT CHANGE UP (ref 22–31)
CREAT SERPL-MCNC: 1.02 MG/DL — SIGNIFICANT CHANGE UP (ref 0.5–1.3)
GLUCOSE SERPL-MCNC: 93 MG/DL — SIGNIFICANT CHANGE UP (ref 70–99)
HCT VFR BLD CALC: 36.8 % — LOW (ref 39–50)
HGB BLD-MCNC: 13.1 G/DL — SIGNIFICANT CHANGE UP (ref 13–17)
MAGNESIUM SERPL-MCNC: 2.7 MG/DL — HIGH (ref 1.6–2.6)
MCHC RBC-ENTMCNC: 31.5 PG — SIGNIFICANT CHANGE UP (ref 27–34)
MCHC RBC-ENTMCNC: 35.7 GM/DL — SIGNIFICANT CHANGE UP (ref 32–36)
MCV RBC AUTO: 88.2 FL — SIGNIFICANT CHANGE UP (ref 80–100)
PHOSPHATE SERPL-MCNC: 2.4 MG/DL — LOW (ref 2.5–4.5)
PLATELET # BLD AUTO: 181 K/UL — SIGNIFICANT CHANGE UP (ref 150–400)
POTASSIUM SERPL-MCNC: 4.3 MMOL/L — SIGNIFICANT CHANGE UP (ref 3.5–5.3)
POTASSIUM SERPL-SCNC: 4.3 MMOL/L — SIGNIFICANT CHANGE UP (ref 3.5–5.3)
RBC # BLD: 4.17 M/UL — LOW (ref 4.2–5.8)
RBC # FLD: 12.2 % — SIGNIFICANT CHANGE UP (ref 10.3–14.5)
SODIUM SERPL-SCNC: 146 MMOL/L — HIGH (ref 135–145)
VANCOMYCIN TROUGH SERPL-MCNC: 7.1 UG/ML — LOW (ref 10–20)
WBC # BLD: 17 K/UL — HIGH (ref 3.8–10.5)
WBC # FLD AUTO: 17 K/UL — HIGH (ref 3.8–10.5)

## 2017-05-27 RX ORDER — POTASSIUM PHOSPHATE, MONOBASIC POTASSIUM PHOSPHATE, DIBASIC 236; 224 MG/ML; MG/ML
15 INJECTION, SOLUTION INTRAVENOUS ONCE
Qty: 0 | Refills: 0 | Status: COMPLETED | OUTPATIENT
Start: 2017-05-27 | End: 2017-05-27

## 2017-05-27 RX ADMIN — PIPERACILLIN AND TAZOBACTAM 25 GRAM(S): 4; .5 INJECTION, POWDER, LYOPHILIZED, FOR SOLUTION INTRAVENOUS at 14:44

## 2017-05-27 RX ADMIN — Medication 3 MILLILITER(S): at 20:26

## 2017-05-27 RX ADMIN — Medication 150 MILLIGRAM(S): at 18:52

## 2017-05-27 RX ADMIN — Medication 150 MILLIGRAM(S): at 05:52

## 2017-05-27 RX ADMIN — HEPARIN SODIUM 5000 UNIT(S): 5000 INJECTION INTRAVENOUS; SUBCUTANEOUS at 21:36

## 2017-05-27 RX ADMIN — PIPERACILLIN AND TAZOBACTAM 25 GRAM(S): 4; .5 INJECTION, POWDER, LYOPHILIZED, FOR SOLUTION INTRAVENOUS at 05:52

## 2017-05-27 RX ADMIN — PANTOPRAZOLE SODIUM 40 MILLIGRAM(S): 20 TABLET, DELAYED RELEASE ORAL at 11:59

## 2017-05-27 RX ADMIN — POTASSIUM PHOSPHATE, MONOBASIC POTASSIUM PHOSPHATE, DIBASIC 62.5 MILLIMOLE(S): 236; 224 INJECTION, SOLUTION INTRAVENOUS at 09:02

## 2017-05-27 RX ADMIN — HEPARIN SODIUM 5000 UNIT(S): 5000 INJECTION INTRAVENOUS; SUBCUTANEOUS at 14:44

## 2017-05-27 RX ADMIN — Medication 20 MILLIGRAM(S): at 05:52

## 2017-05-27 RX ADMIN — PIPERACILLIN AND TAZOBACTAM 25 GRAM(S): 4; .5 INJECTION, POWDER, LYOPHILIZED, FOR SOLUTION INTRAVENOUS at 21:36

## 2017-05-27 RX ADMIN — FLUCONAZOLE 50 MILLIGRAM(S): 150 TABLET ORAL at 13:15

## 2017-05-27 RX ADMIN — HEPARIN SODIUM 5000 UNIT(S): 5000 INJECTION INTRAVENOUS; SUBCUTANEOUS at 05:52

## 2017-05-27 NOTE — PROGRESS NOTE ADULT - ASSESSMENT
POD 6 s/p sigmoid resection and rutledge's for diverticulitis and ileocolic resection. Overall improving.      transfer to step down  Remain NPO, plan NGT clamp trial tomorrow  Antibiotics per ID  Continue diuresis  Physical therapy consult, increase activity to mobilize fluids

## 2017-05-27 NOTE — PROGRESS NOTE ADULT - SUBJECTIVE AND OBJECTIVE BOX
CC:Weakness    HPI:  47M admitted for a 24hr h/o progressive abdominal pain that began after a 1-2 day trip to [facility]. The pain is localized to the suprapubic location, associated with bladder pressure and decreased po intake. No nausea, fevers/chills. Cont passing flatus and reports stools are somewhat diarrheal. WBC 18, CT with acute sigmoid diverticulitis, no drainable abscess or fluid collection, though small amounts of free fluid adjacent to appendix. (19 May 2017 18:07)    5/27 - Events noted. Feeling better. No CP or SOB.      PAST MEDICAL & SURGICAL HISTORY:  Fatty liver  No significant past surgical history      FAMILY HISTORY:  No pertinent family history in first degree relatives      Social Hx:    Allergies    No Known Allergies    Intolerances        47y        ICU Vital Signs Last 24 Hrs  T(C): 36.4, Max: 36.5 (05-26 @ 09:00)  T(F): 97.6, Max: 97.7 (05-26 @ 09:00)  HR: 63 (62 - 76)  BP: 113/53 (102/59 - 125/58)  BP(mean): 65 (65 - 73)  ABP: --  ABP(mean): --  RR: 16 (10 - 25)  SpO2: 99% (98% - 100%)          I&O's Summary  I & Os for 24h ending 26 May 2017 07:00  =============================================  IN: 1359.6 ml / OUT: 3955 ml / NET: -2595.4 ml    I & Os for current day (as of 27 May 2017 06:22)  =============================================  IN: 600 ml / OUT: 2125 ml / NET: -1525 ml                            13.1   17.0  )-----------( 181      ( 27 May 2017 05:20 )             36.8       05-27    146<H>  |  113<H>  |  28<H>  ----------------------------<  93  4.3   |  28  |  1.02    Ca    7.2<L>      27 May 2017 05:20  Phos  2.4     05-27  Mg     2.7     05-27    TPro  x   /  Alb  1.1<L>  /  TBili  x   /  DBili  x   /  AST  x   /  ALT  x   /  AlkPhos  x   05-26      CAPILLARY BLOOD GLUCOSE      LIVER FUNCTIONS - ( 26 May 2017 05:30 )  Alb: 1.1 g/dL / Pro: x     / ALK PHOS: x     / ALT: x     / AST: x     / GGT: x             MEDICATIONS  (STANDING):  pantoprazole  Injectable 40milliGRAM(s) IV Push daily  heparin  Injectable 5000Unit(s) SubCutaneous every 8 hours  ALBUTerol/ipratropium for Nebulization 3milliLiter(s) Nebulizer every 6 hours  fluconAZOLE IVPB  IV Intermittent   fluconAZOLE IVPB 100milliGRAM(s) IV Intermittent every 24 hours  piperacillin/tazobactam IVPB. 3.375Gram(s) IV Intermittent every 8 hours  vancomycin  IVPB 750milliGRAM(s) IV Intermittent every 12 hours  furosemide   Injectable 20milliGRAM(s) IV Push daily    MEDICATIONS  (PRN):  naloxone Injectable 0.1milliGRAM(s) IV Push every 3 minutes PRN For ANY of the following changes in patient status:  A. RR LESS THAN 10 breaths per minute, B. Oxygen saturation LESS THAN 90%, C. Sedation score of 6  ondansetron Injectable 4milliGRAM(s) IV Push every 6 hours PRN Nausea  ondansetron Injectable 4milliGRAM(s) IV Push every 6 hours PRN Nausea  acetaminophen  Suppository 650milliGRAM(s) Rectal every 6 hours PRN For Temp greater than 38.5 C (101.3 F)  morphine  - Injectable 2milliGRAM(s) IV Push every 4 hours PRN Moderate Pain (4 - 6)          DVT Prophylaxis: Hep SQ    Advanced Directives:  Discussed with:    Visit Information:    ** Time is exclusive of billed procedures and/or teaching and/or routine family updates.

## 2017-05-27 NOTE — PROGRESS NOTE ADULT - SUBJECTIVE AND OBJECTIVE BOX
Subjective:    pat feeling better, off pressors.    MEDICATIONS  (STANDING):  pantoprazole  Injectable 40milliGRAM(s) IV Push daily  heparin  Injectable 5000Unit(s) SubCutaneous every 8 hours  ALBUTerol/ipratropium for Nebulization 3milliLiter(s) Nebulizer every 6 hours  fluconAZOLE IVPB  IV Intermittent   fluconAZOLE IVPB 100milliGRAM(s) IV Intermittent every 24 hours  piperacillin/tazobactam IVPB. 3.375Gram(s) IV Intermittent every 8 hours  vancomycin  IVPB 750milliGRAM(s) IV Intermittent every 12 hours  furosemide   Injectable 20milliGRAM(s) IV Push daily    MEDICATIONS  (PRN):  naloxone Injectable 0.1milliGRAM(s) IV Push every 3 minutes PRN For ANY of the following changes in patient status:  A. RR LESS THAN 10 breaths per minute, B. Oxygen saturation LESS THAN 90%, C. Sedation score of 6  ondansetron Injectable 4milliGRAM(s) IV Push every 6 hours PRN Nausea  ondansetron Injectable 4milliGRAM(s) IV Push every 6 hours PRN Nausea  acetaminophen  Suppository 650milliGRAM(s) Rectal every 6 hours PRN For Temp greater than 38.5 C (101.3 F)  morphine  - Injectable 2milliGRAM(s) IV Push every 4 hours PRN Moderate Pain (4 - 6)      Allergies    No Known Allergies    Intolerances        Vital Signs Last 24 Hrs  T(C): 36.1, Max: 36.4 (05-26 @ 20:00)  T(F): 96.9, Max: 97.6 (05-26 @ 20:00)  HR: 62 (61 - 75)  BP: 115/56 (102/59 - 125/58)  BP(mean): 68 (59 - 93)  RR: 17 (10 - 25)  SpO2: 97% (97% - 100%)    PHYSICAL EXAMINATION:    NECK:  Supple. No lymphadenopathy. Jugular venous pressure not elevated. Carotids equal.   HEART:   The cardiac impulse has a normal quality. Reg., Nl S1 and S2.  There are no murmurs, rubs or gallops noted  CHEST:  Chest is clear to auscultation. Normal respiratory effort.  ABDOMEN:  Soft and nontender.   EXTREMITIES:  There is no edema.       LABS:                        13.1   17.0  )-----------( 181      ( 27 May 2017 05:20 )             36.8     05-27    146<H>  |  113<H>  |  28<H>  ----------------------------<  93  4.3   |  28  |  1.02    Ca    7.2<L>      27 May 2017 05:20  Phos  2.4     05-27  Mg     2.7     05-27    TPro  x   /  Alb  1.1<L>  /  TBili  x   /  DBili  x   /  AST  x   /  ALT  x   /  AlkPhos  x   05-26

## 2017-05-27 NOTE — PROGRESS NOTE ADULT - ASSESSMENT
48yo M with:  Septic Shock - Resloved  s/p resection of sigmoid diverticulitis with abscess on 5/21  HARDIK due to ATN - Resolved   metabolic acidosis - Resolved    Plan:  Clinically improved  Septic Shock - Off pressors  NPO per surgery  May need TPN in next few days  Cont 1/2 NS @ 75cc/hr  Monitor renal function - improving  Strict I/O's - Good output   IV Vanco  / Zosyn / Diflucan per ID   DVT prophylaxis - Hep SQ  I have spoken to patient and his wife regarding patient's current status, plan of care, and prognosis with all questions answered in detail.  Case d/w CRS team and ID and Nephrology team

## 2017-05-27 NOTE — PROGRESS NOTE ADULT - ASSESSMENT
PROBLEMS:    Diverticulitis of intestine with perforation S/P LAP  septic shock  DYSNEA  HYPOXAMIA  ATELECTASIS  COPD    PLAN;    pulmonary unchanged  iv abx-zosyn/flagyl/diflucan/vanco  AEROSOLS  SUPPORTIVE CARE  DVT PROPHYLASIX

## 2017-05-27 NOTE — PROGRESS NOTE ADULT - SUBJECTIVE AND OBJECTIVE BOX
Stable overnight. No new issues. NG output slowing down but still bilious. Was reported as 100 cc overnight.              Exam:  Vital Signs Last 24 Hrs  T(C): 36.1, Max: 36.4 (05-26 @ 20:00)  T(F): 96.9, Max: 97.6 (05-26 @ 20:00)  HR: 62 (61 - 75)  BP: 115/56 (102/59 - 125/58)  BP(mean): 68 (59 - 93)  RR: 17 (10 - 25)  SpO2: 97% (97% - 100%)    General: in no distress, anasarca   Respiratory: non labored  Heart: regular rate and rhythm  Abdomen: distended, non tender, colostomy without significant output, incision intact - no sign of infection.  LUIS M drain with SS output  Neuro: alert and oriented x 3                          13.1   17.0  )-----------( 181      ( 27 May 2017 05:20 )             36.8   05-27    146<H>  |  113<H>  |  28<H>  ----------------------------<  93  4.3   |  28  |  1.02    Ca    7.2<L>      27 May 2017 05:20  Phos  2.4     05-27  Mg     2.7     05-27    TPro  x   /  Alb  1.1<L>  /  TBili  x   /  DBili  x   /  AST  x   /  ALT  x   /  AlkPhos  x   05-26

## 2017-05-28 LAB
ANION GAP SERPL CALC-SCNC: 3 MMOL/L — LOW (ref 5–17)
BUN SERPL-MCNC: 25 MG/DL — HIGH (ref 7–23)
CALCIUM SERPL-MCNC: 7.3 MG/DL — LOW (ref 8.5–10.1)
CHLORIDE SERPL-SCNC: 109 MMOL/L — HIGH (ref 96–108)
CO2 SERPL-SCNC: 30 MMOL/L — SIGNIFICANT CHANGE UP (ref 22–31)
CREAT SERPL-MCNC: 1.21 MG/DL — SIGNIFICANT CHANGE UP (ref 0.5–1.3)
GLUCOSE SERPL-MCNC: 105 MG/DL — HIGH (ref 70–99)
MAGNESIUM SERPL-MCNC: 2.5 MG/DL — SIGNIFICANT CHANGE UP (ref 1.6–2.6)
PHOSPHATE SERPL-MCNC: 2.6 MG/DL — SIGNIFICANT CHANGE UP (ref 2.5–4.5)
POTASSIUM SERPL-MCNC: 4.1 MMOL/L — SIGNIFICANT CHANGE UP (ref 3.5–5.3)
POTASSIUM SERPL-SCNC: 4.1 MMOL/L — SIGNIFICANT CHANGE UP (ref 3.5–5.3)
SODIUM SERPL-SCNC: 142 MMOL/L — SIGNIFICANT CHANGE UP (ref 135–145)

## 2017-05-28 RX ADMIN — PIPERACILLIN AND TAZOBACTAM 25 GRAM(S): 4; .5 INJECTION, POWDER, LYOPHILIZED, FOR SOLUTION INTRAVENOUS at 21:19

## 2017-05-28 RX ADMIN — FLUCONAZOLE 50 MILLIGRAM(S): 150 TABLET ORAL at 13:52

## 2017-05-28 RX ADMIN — Medication 150 MILLIGRAM(S): at 05:08

## 2017-05-28 RX ADMIN — HEPARIN SODIUM 5000 UNIT(S): 5000 INJECTION INTRAVENOUS; SUBCUTANEOUS at 21:19

## 2017-05-28 RX ADMIN — MORPHINE SULFATE 2 MILLIGRAM(S): 50 CAPSULE, EXTENDED RELEASE ORAL at 10:03

## 2017-05-28 RX ADMIN — HEPARIN SODIUM 5000 UNIT(S): 5000 INJECTION INTRAVENOUS; SUBCUTANEOUS at 05:08

## 2017-05-28 RX ADMIN — Medication 3 MILLILITER(S): at 19:57

## 2017-05-28 RX ADMIN — HEPARIN SODIUM 5000 UNIT(S): 5000 INJECTION INTRAVENOUS; SUBCUTANEOUS at 13:52

## 2017-05-28 RX ADMIN — Medication 20 MILLIGRAM(S): at 05:08

## 2017-05-28 RX ADMIN — Medication 150 MILLIGRAM(S): at 18:53

## 2017-05-28 RX ADMIN — Medication 3 MILLILITER(S): at 02:21

## 2017-05-28 RX ADMIN — MORPHINE SULFATE 2 MILLIGRAM(S): 50 CAPSULE, EXTENDED RELEASE ORAL at 18:51

## 2017-05-28 RX ADMIN — PIPERACILLIN AND TAZOBACTAM 25 GRAM(S): 4; .5 INJECTION, POWDER, LYOPHILIZED, FOR SOLUTION INTRAVENOUS at 14:52

## 2017-05-28 RX ADMIN — PANTOPRAZOLE SODIUM 40 MILLIGRAM(S): 20 TABLET, DELAYED RELEASE ORAL at 11:50

## 2017-05-28 RX ADMIN — PIPERACILLIN AND TAZOBACTAM 25 GRAM(S): 4; .5 INJECTION, POWDER, LYOPHILIZED, FOR SOLUTION INTRAVENOUS at 05:08

## 2017-05-28 RX ADMIN — MORPHINE SULFATE 2 MILLIGRAM(S): 50 CAPSULE, EXTENDED RELEASE ORAL at 17:11

## 2017-05-28 NOTE — PROGRESS NOTE ADULT - SUBJECTIVE AND OBJECTIVE BOX
CC: weakness    HPI:  47M admitted for a 24hr h/o progressive abdominal pain that began after a 1-2 day trip to [facility]. The pain is localized to the suprapubic location, associated with bladder pressure and decreased po intake. No nausea, fevers/chills. Cont passing flatus and reports stools are somewhat diarrheal. WBC 18, CT with acute sigmoid diverticulitis, no drainable abscess or fluid collection, though small amounts of free fluid adjacent to appendix. (19 May 2017 18:07)    5/28 - Feels better this AM. No CP or SOB.      PAST MEDICAL & SURGICAL HISTORY:  Fatty liver  No significant past surgical history      FAMILY HISTORY:  No pertinent family history in first degree relatives      Social Hx:    Allergies    No Known Allergies    Intolerances        47y        ICU Vital Signs Last 24 Hrs  T(C): 36.5, Max: 36.9 (05-27 @ 22:00)  T(F): 97.7, Max: 98.5 (05-27 @ 22:00)  HR: 69 (62 - 85)  BP: 117/57 (89/34 - 134/61)  BP(mean): 72 (46 - 79)  ABP: --  ABP(mean): --  RR: 25 (14 - 31)  SpO2: 98% (97% - 100%)          I&O's Summary    I & Os for current day (as of 28 May 2017 11:03)  =============================================  IN: 900 ml / OUT: 2340 ml / NET: -1440 ml                            13.1   17.0  )-----------( 181      ( 27 May 2017 05:20 )             36.8       05-27    146<H>  |  113<H>  |  28<H>  ----------------------------<  93  4.3   |  28  |  1.02    Ca    7.2<L>      27 May 2017 05:20  Phos  2.4     05-27  Mg     2.7     05-27        CAPILLARY BLOOD GLUCOSE      MEDICATIONS  (STANDING):  pantoprazole  Injectable 40milliGRAM(s) IV Push daily  heparin  Injectable 5000Unit(s) SubCutaneous every 8 hours  ALBUTerol/ipratropium for Nebulization 3milliLiter(s) Nebulizer every 6 hours  fluconAZOLE IVPB  IV Intermittent   fluconAZOLE IVPB 100milliGRAM(s) IV Intermittent every 24 hours  piperacillin/tazobactam IVPB. 3.375Gram(s) IV Intermittent every 8 hours  vancomycin  IVPB 750milliGRAM(s) IV Intermittent every 12 hours  furosemide   Injectable 20milliGRAM(s) IV Push daily    MEDICATIONS  (PRN):  naloxone Injectable 0.1milliGRAM(s) IV Push every 3 minutes PRN For ANY of the following changes in patient status:  A. RR LESS THAN 10 breaths per minute, B. Oxygen saturation LESS THAN 90%, C. Sedation score of 6  ondansetron Injectable 4milliGRAM(s) IV Push every 6 hours PRN Nausea  ondansetron Injectable 4milliGRAM(s) IV Push every 6 hours PRN Nausea  acetaminophen  Suppository 650milliGRAM(s) Rectal every 6 hours PRN For Temp greater than 38.5 C (101.3 F)  morphine  - Injectable 2milliGRAM(s) IV Push every 4 hours PRN Moderate Pain (4 - 6)          DVT Prophylaxis: Hep SQ    Advanced Directives:  Discussed with:    Visit Information:    ** Time is exclusive of billed procedures and/or teaching and/or routine family updates.

## 2017-05-28 NOTE — PROGRESS NOTE ADULT - CONSTITUTIONAL
Well-developed, well nourished
detailed exam

## 2017-05-28 NOTE — PROGRESS NOTE ADULT - ASSESSMENT
46yo M with:  Septic Shock - Resolved  s/p resection of sigmoid diverticulitis with abscess on 5/21  HARDIK due to ATN - Resolved   metabolic acidosis - Resolved    Plan:  Clinically improved  Septic Shock - Resolved  NGT clamped  May benefit from clear liquid diet  If taking PO then dc 1/2 NS @ 75cc/hr  Monitor renal function - improving  Strict I/O's - Good output   IV Vanco  / Zosyn / Diflucan per ID   DVT prophylaxis - Hep SQ  I have spoken to patient and his wife regarding patient's current status, plan of care, and prognosis with all questions answered in detail.  Case d/w CRS team

## 2017-05-28 NOTE — PROGRESS NOTE ADULT - ASSESSMENT
POD 7 s/p sigmoid resection and rutledge's for diverticulitis and ileocolic resection. Overall improving.      Check labs today  NG clamp trial and plan to remove and initiate diet  Antibiotics per ID  Continue diuresis  Physical therapy consult, increase activity to mobilize fluids  VTE prophylaxis

## 2017-05-28 NOTE — PROGRESS NOTE ADULT - RESPIRATORY
Breath Sounds equal & clear to percussion & auscultation, no accessory muscle use
detailed exam

## 2017-05-28 NOTE — PROGRESS NOTE ADULT - SUBJECTIVE AND OBJECTIVE BOX
Stable. Having some bowel function.          Exam:  Vital Signs Last 24 Hrs  T(C): 36.5, Max: 36.9 (05-27 @ 22:00)  T(F): 97.7, Max: 98.5 (05-27 @ 22:00)  HR: 69 (62 - 85)  BP: 117/57 (89/34 - 134/61)  BP(mean): 72 (46 - 79)  RR: 25 (14 - 31)  SpO2: 98% (97% - 100%)    General: in no distress, anasarca   Respiratory: non labored  Heart: regular rate and rhythm  Abdomen: distended, non tender, colostomy with small amount of stool.  LUIS M drain with SS output  Neuro: alert and oriented x 3                          13.1   17.0  )-----------( 181      ( 27 May 2017 05:20 )             36.8   05-27    146<H>  |  113<H>  |  28<H>  ----------------------------<  93  4.3   |  28  |  1.02    Ca    7.2<L>      27 May 2017 05:20  Phos  2.4     05-27  Mg     2.7     05-27    TPro  x   /  Alb  1.1<L>  /  TBili  x   /  DBili  x   /  AST  x   /  ALT  x   /  AlkPhos  x   05-26

## 2017-05-28 NOTE — PROGRESS NOTE ADULT - SUBJECTIVE AND OBJECTIVE BOX
Subjective:    pat better, no new complaint.    MEDICATIONS  (STANDING):  pantoprazole  Injectable 40milliGRAM(s) IV Push daily  heparin  Injectable 5000Unit(s) SubCutaneous every 8 hours  ALBUTerol/ipratropium for Nebulization 3milliLiter(s) Nebulizer every 6 hours  fluconAZOLE IVPB  IV Intermittent   fluconAZOLE IVPB 100milliGRAM(s) IV Intermittent every 24 hours  piperacillin/tazobactam IVPB. 3.375Gram(s) IV Intermittent every 8 hours  vancomycin  IVPB 750milliGRAM(s) IV Intermittent every 12 hours  furosemide   Injectable 20milliGRAM(s) IV Push daily    MEDICATIONS  (PRN):  naloxone Injectable 0.1milliGRAM(s) IV Push every 3 minutes PRN For ANY of the following changes in patient status:  A. RR LESS THAN 10 breaths per minute, B. Oxygen saturation LESS THAN 90%, C. Sedation score of 6  ondansetron Injectable 4milliGRAM(s) IV Push every 6 hours PRN Nausea  ondansetron Injectable 4milliGRAM(s) IV Push every 6 hours PRN Nausea  acetaminophen  Suppository 650milliGRAM(s) Rectal every 6 hours PRN For Temp greater than 38.5 C (101.3 F)  morphine  - Injectable 2milliGRAM(s) IV Push every 4 hours PRN Moderate Pain (4 - 6)      Allergies    No Known Allergies    Intolerances        Vital Signs Last 24 Hrs  T(C): 36.5, Max: 36.9 (05-27 @ 22:00)  T(F): 97.7, Max: 98.5 (05-27 @ 22:00)  HR: 69 (62 - 85)  BP: 117/57 (89/34 - 134/61)  BP(mean): 72 (46 - 79)  RR: 25 (14 - 31)  SpO2: 98% (97% - 100%)    PHYSICAL EXAMINATION:    NECK:  Supple. No lymphadenopathy. Jugular venous pressure not elevated. Carotids equal.   HEART:   The cardiac impulse has a normal quality. Reg., Nl S1 and S2.  There are no murmurs, rubs or gallops noted  CHEST:  Chest is clear to auscultation. Normal respiratory effort.  ABDOMEN:  Soft and nontender.   EXTREMITIES:  There is no edema.       LABS:                        13.1   17.0  )-----------( 181      ( 27 May 2017 05:20 )             36.8     05-27    146<H>  |  113<H>  |  28<H>  ----------------------------<  93  4.3   |  28  |  1.02    Ca    7.2<L>      27 May 2017 05:20  Phos  2.4     05-27  Mg     2.7     05-27

## 2017-05-28 NOTE — PROGRESS NOTE ADULT - EXTREMITIES
No cyanosis, clubbing or edema
detailed exam
detailed exam

## 2017-05-28 NOTE — PROGRESS NOTE ADULT - NS NEC GEN PE MLT EXAM PC
No bruits; no thyromegaly or nodules
detailed exam

## 2017-05-29 LAB
ANION GAP SERPL CALC-SCNC: 7 MMOL/L — SIGNIFICANT CHANGE UP (ref 5–17)
BUN SERPL-MCNC: 22 MG/DL — SIGNIFICANT CHANGE UP (ref 7–23)
CALCIUM SERPL-MCNC: 7.3 MG/DL — LOW (ref 8.5–10.1)
CHLORIDE SERPL-SCNC: 104 MMOL/L — SIGNIFICANT CHANGE UP (ref 96–108)
CO2 SERPL-SCNC: 30 MMOL/L — SIGNIFICANT CHANGE UP (ref 22–31)
CREAT SERPL-MCNC: 1.15 MG/DL — SIGNIFICANT CHANGE UP (ref 0.5–1.3)
GLUCOSE SERPL-MCNC: 119 MG/DL — HIGH (ref 70–99)
HCT VFR BLD CALC: 38 % — LOW (ref 39–50)
HGB BLD-MCNC: 12.8 G/DL — LOW (ref 13–17)
LACTATE SERPL-SCNC: 3 MMOL/L — HIGH (ref 0.7–2)
MAGNESIUM SERPL-MCNC: 2.5 MG/DL — SIGNIFICANT CHANGE UP (ref 1.6–2.6)
MCHC RBC-ENTMCNC: 30.7 PG — SIGNIFICANT CHANGE UP (ref 27–34)
MCHC RBC-ENTMCNC: 33.8 GM/DL — SIGNIFICANT CHANGE UP (ref 32–36)
MCV RBC AUTO: 90.7 FL — SIGNIFICANT CHANGE UP (ref 80–100)
PHOSPHATE SERPL-MCNC: 2.1 MG/DL — LOW (ref 2.5–4.5)
PLATELET # BLD AUTO: 272 K/UL — SIGNIFICANT CHANGE UP (ref 150–400)
POTASSIUM SERPL-MCNC: 3.7 MMOL/L — SIGNIFICANT CHANGE UP (ref 3.5–5.3)
POTASSIUM SERPL-SCNC: 3.7 MMOL/L — SIGNIFICANT CHANGE UP (ref 3.5–5.3)
RBC # BLD: 4.19 M/UL — LOW (ref 4.2–5.8)
RBC # FLD: 12.9 % — SIGNIFICANT CHANGE UP (ref 10.3–14.5)
SODIUM SERPL-SCNC: 141 MMOL/L — SIGNIFICANT CHANGE UP (ref 135–145)
WBC # BLD: 21 K/UL — HIGH (ref 3.8–10.5)
WBC # FLD AUTO: 21 K/UL — HIGH (ref 3.8–10.5)

## 2017-05-29 PROCEDURE — 74177 CT ABD & PELVIS W/CONTRAST: CPT | Mod: 26

## 2017-05-29 RX ORDER — SODIUM CHLORIDE 9 MG/ML
1000 INJECTION INTRAMUSCULAR; INTRAVENOUS; SUBCUTANEOUS
Qty: 0 | Refills: 0 | Status: DISCONTINUED | OUTPATIENT
Start: 2017-05-30 | End: 2017-05-30

## 2017-05-29 RX ORDER — POTASSIUM PHOSPHATE, MONOBASIC POTASSIUM PHOSPHATE, DIBASIC 236; 224 MG/ML; MG/ML
15 INJECTION, SOLUTION INTRAVENOUS ONCE
Qty: 0 | Refills: 0 | Status: COMPLETED | OUTPATIENT
Start: 2017-05-29 | End: 2017-05-29

## 2017-05-29 RX ADMIN — Medication 20 MILLIGRAM(S): at 06:20

## 2017-05-29 RX ADMIN — POTASSIUM PHOSPHATE, MONOBASIC POTASSIUM PHOSPHATE, DIBASIC 62.5 MILLIMOLE(S): 236; 224 INJECTION, SOLUTION INTRAVENOUS at 17:23

## 2017-05-29 RX ADMIN — PIPERACILLIN AND TAZOBACTAM 25 GRAM(S): 4; .5 INJECTION, POWDER, LYOPHILIZED, FOR SOLUTION INTRAVENOUS at 06:19

## 2017-05-29 RX ADMIN — PIPERACILLIN AND TAZOBACTAM 25 GRAM(S): 4; .5 INJECTION, POWDER, LYOPHILIZED, FOR SOLUTION INTRAVENOUS at 15:26

## 2017-05-29 RX ADMIN — HEPARIN SODIUM 5000 UNIT(S): 5000 INJECTION INTRAVENOUS; SUBCUTANEOUS at 21:29

## 2017-05-29 RX ADMIN — Medication 3 MILLILITER(S): at 07:53

## 2017-05-29 RX ADMIN — HEPARIN SODIUM 5000 UNIT(S): 5000 INJECTION INTRAVENOUS; SUBCUTANEOUS at 13:11

## 2017-05-29 RX ADMIN — Medication 3 MILLILITER(S): at 02:08

## 2017-05-29 RX ADMIN — Medication 3 MILLILITER(S): at 19:24

## 2017-05-29 RX ADMIN — HEPARIN SODIUM 5000 UNIT(S): 5000 INJECTION INTRAVENOUS; SUBCUTANEOUS at 06:20

## 2017-05-29 RX ADMIN — FLUCONAZOLE 50 MILLIGRAM(S): 150 TABLET ORAL at 14:21

## 2017-05-29 RX ADMIN — Medication 3 MILLILITER(S): at 13:39

## 2017-05-29 RX ADMIN — MORPHINE SULFATE 2 MILLIGRAM(S): 50 CAPSULE, EXTENDED RELEASE ORAL at 09:04

## 2017-05-29 RX ADMIN — PIPERACILLIN AND TAZOBACTAM 25 GRAM(S): 4; .5 INJECTION, POWDER, LYOPHILIZED, FOR SOLUTION INTRAVENOUS at 21:29

## 2017-05-29 RX ADMIN — PANTOPRAZOLE SODIUM 40 MILLIGRAM(S): 20 TABLET, DELAYED RELEASE ORAL at 13:11

## 2017-05-29 RX ADMIN — Medication 150 MILLIGRAM(S): at 06:19

## 2017-05-29 RX ADMIN — MORPHINE SULFATE 2 MILLIGRAM(S): 50 CAPSULE, EXTENDED RELEASE ORAL at 09:20

## 2017-05-29 RX ADMIN — Medication 150 MILLIGRAM(S): at 19:39

## 2017-05-29 NOTE — PROGRESS NOTE ADULT - ASSESSMENT
POD 8 s/p sigmoid resection and rutledge's for diverticulitis and ileocolic resection. Doing well but worsening leukocytosis    CT scan today of abdomen to rule out intraabdominal abscess- he is at high risk for this  Full liquid diet  Antibiotics per ID  Physical therapy consult, increase activity to mobilize fluids  VTE prophylaxis

## 2017-05-29 NOTE — PROGRESS NOTE ADULT - SUBJECTIVE AND OBJECTIVE BOX
Subjective:    pat taking oral liquids, no respiratory issues.    MEDICATIONS  (STANDING):  pantoprazole  Injectable 40milliGRAM(s) IV Push daily  heparin  Injectable 5000Unit(s) SubCutaneous every 8 hours  ALBUTerol/ipratropium for Nebulization 3milliLiter(s) Nebulizer every 6 hours  fluconAZOLE IVPB  IV Intermittent   fluconAZOLE IVPB 100milliGRAM(s) IV Intermittent every 24 hours  piperacillin/tazobactam IVPB. 3.375Gram(s) IV Intermittent every 8 hours  vancomycin  IVPB 750milliGRAM(s) IV Intermittent every 12 hours  furosemide   Injectable 20milliGRAM(s) IV Push daily    MEDICATIONS  (PRN):  naloxone Injectable 0.1milliGRAM(s) IV Push every 3 minutes PRN For ANY of the following changes in patient status:  A. RR LESS THAN 10 breaths per minute, B. Oxygen saturation LESS THAN 90%, C. Sedation score of 6  ondansetron Injectable 4milliGRAM(s) IV Push every 6 hours PRN Nausea  ondansetron Injectable 4milliGRAM(s) IV Push every 6 hours PRN Nausea  acetaminophen  Suppository 650milliGRAM(s) Rectal every 6 hours PRN For Temp greater than 38.5 C (101.3 F)  morphine  - Injectable 2milliGRAM(s) IV Push every 4 hours PRN Moderate Pain (4 - 6)      Allergies    No Known Allergies    Intolerances        Vital Signs Last 24 Hrs  T(C): 37.4, Max: 37.8 (05-29 @ 05:46)  T(F): 99.4, Max: 100.1 (05-29 @ 05:46)  HR: 88 (69 - 98)  BP: 117/57 (102/80 - 129/59)  BP(mean): 68 (68 - 86)  RR: 30 (17 - 30)  SpO2: 94% (94% - 100%)    PHYSICAL EXAMINATION:    NECK:  Supple. No lymphadenopathy. Jugular venous pressure not elevated. Carotids equal.   HEART:   The cardiac impulse has a normal quality. Reg., Nl S1 and S2.  There are no murmurs, rubs or gallops noted  CHEST:  Chest is clear to auscultation. Normal respiratory effort.  ABDOMEN:  Soft and nontender.   EXTREMITIES:  There is no edema.       LABS:                        12.8   21.0  )-----------( 272      ( 29 May 2017 05:39 )             38.0     05-29    141  |  104  |  22  ----------------------------<  119<H>  3.7   |  30  |  1.15    Ca    7.3<L>      29 May 2017 05:39  Phos  2.1     05-29  Mg     2.5     05-29

## 2017-05-29 NOTE — PROGRESS NOTE ADULT - SUBJECTIVE AND OBJECTIVE BOX
Feels well. No issues overnight. Tolerating clears. Large amount of serous drainage from LUIS M. No fevers    Exam:  Vital Signs Last 24 Hrs  T(C): 37.8, Max: 37.8 (05-29 @ 05:46)  T(F): 100.1, Max: 100.1 (05-29 @ 05:46)  HR: 78 (69 - 98)  BP: 125/60 (102/80 - 130/55)  BP(mean): 73 (69 - 86)  RR: 24 (17 - 28)  SpO2: 94% (94% - 100%)    General: in no distress, anasarca   Respiratory: non labored  Heart: regular rate and rhythm  Abdomen: distended, non tender, colostomy with stool.  LUIS M drain with SS output  Neuro: alert and oriented x 3                          12.8   21.0  )-----------( 272      ( 29 May 2017 05:39 )             38.0   05-29    141  |  104  |  22  ----------------------------<  119<H>  3.7   |  30  |  1.15    Ca    7.3<L>      29 May 2017 05:39  Phos  2.1     05-29  Mg     2.5     05-29

## 2017-05-29 NOTE — PROGRESS NOTE ADULT - ASSESSMENT
PROBLEMS:    Diverticulitis of intestine with perforation S/P LAP  septic shock  DYSNEA  HYPOXAMIA  ATELECTASIS  COPD    PLAN;    pulmonary stable  OOB  iv abx-zosyn/diflucan/vanco  AEROSOLS  SUPPORTIVE CARE  DVT PROPHYLASIX

## 2017-05-30 DIAGNOSIS — R60.1 GENERALIZED EDEMA: ICD-10-CM

## 2017-05-30 DIAGNOSIS — N17.9 ACUTE KIDNEY FAILURE, UNSPECIFIED: ICD-10-CM

## 2017-05-30 LAB
ANION GAP SERPL CALC-SCNC: 8 MMOL/L — SIGNIFICANT CHANGE UP (ref 5–17)
BUN SERPL-MCNC: 15 MG/DL — SIGNIFICANT CHANGE UP (ref 7–23)
CALCIUM SERPL-MCNC: 7.4 MG/DL — LOW (ref 8.5–10.1)
CHLORIDE SERPL-SCNC: 103 MMOL/L — SIGNIFICANT CHANGE UP (ref 96–108)
CO2 SERPL-SCNC: 29 MMOL/L — SIGNIFICANT CHANGE UP (ref 22–31)
CREAT SERPL-MCNC: 0.86 MG/DL — SIGNIFICANT CHANGE UP (ref 0.5–1.3)
GLUCOSE SERPL-MCNC: 101 MG/DL — HIGH (ref 70–99)
HCT VFR BLD CALC: 34.2 % — LOW (ref 39–50)
HGB BLD-MCNC: 11.7 G/DL — LOW (ref 13–17)
MAGNESIUM SERPL-MCNC: 2.4 MG/DL — SIGNIFICANT CHANGE UP (ref 1.6–2.6)
MCHC RBC-ENTMCNC: 30.8 PG — SIGNIFICANT CHANGE UP (ref 27–34)
MCHC RBC-ENTMCNC: 34.3 GM/DL — SIGNIFICANT CHANGE UP (ref 32–36)
MCV RBC AUTO: 89.7 FL — SIGNIFICANT CHANGE UP (ref 80–100)
PHOSPHATE SERPL-MCNC: 2 MG/DL — LOW (ref 2.5–4.5)
PLATELET # BLD AUTO: 284 K/UL — SIGNIFICANT CHANGE UP (ref 150–400)
POTASSIUM SERPL-MCNC: 3.2 MMOL/L — LOW (ref 3.5–5.3)
POTASSIUM SERPL-SCNC: 3.2 MMOL/L — LOW (ref 3.5–5.3)
RBC # BLD: 3.81 M/UL — LOW (ref 4.2–5.8)
RBC # FLD: 13.7 % — SIGNIFICANT CHANGE UP (ref 10.3–14.5)
SODIUM SERPL-SCNC: 140 MMOL/L — SIGNIFICANT CHANGE UP (ref 135–145)
WBC # BLD: 17.8 K/UL — HIGH (ref 3.8–10.5)
WBC # FLD AUTO: 17.8 K/UL — HIGH (ref 3.8–10.5)

## 2017-05-30 PROCEDURE — 49406 IMAGE CATH FLUID PERI/RETRO: CPT

## 2017-05-30 RX ORDER — POTASSIUM CHLORIDE 20 MEQ
40 PACKET (EA) ORAL EVERY 4 HOURS
Qty: 0 | Refills: 0 | Status: COMPLETED | OUTPATIENT
Start: 2017-05-30 | End: 2017-05-30

## 2017-05-30 RX ORDER — FUROSEMIDE 40 MG
20 TABLET ORAL EVERY 12 HOURS
Qty: 0 | Refills: 0 | Status: DISCONTINUED | OUTPATIENT
Start: 2017-05-30 | End: 2017-05-31

## 2017-05-30 RX ORDER — ZOLPIDEM TARTRATE 10 MG/1
5 TABLET ORAL AT BEDTIME
Qty: 0 | Refills: 0 | Status: DISCONTINUED | OUTPATIENT
Start: 2017-05-30 | End: 2017-05-30

## 2017-05-30 RX ORDER — VANCOMYCIN HCL 1 G
1000 VIAL (EA) INTRAVENOUS EVERY 12 HOURS
Qty: 0 | Refills: 0 | Status: DISCONTINUED | OUTPATIENT
Start: 2017-05-30 | End: 2017-06-01

## 2017-05-30 RX ORDER — FENTANYL CITRATE 50 UG/ML
50 INJECTION INTRAVENOUS
Qty: 0 | Refills: 0 | Status: DISCONTINUED | OUTPATIENT
Start: 2017-05-30 | End: 2017-05-30

## 2017-05-30 RX ORDER — SODIUM CHLORIDE 9 MG/ML
1000 INJECTION INTRAMUSCULAR; INTRAVENOUS; SUBCUTANEOUS
Qty: 0 | Refills: 0 | Status: DISCONTINUED | OUTPATIENT
Start: 2017-05-30 | End: 2017-05-30

## 2017-05-30 RX ADMIN — Medication 150 MILLIGRAM(S): at 06:02

## 2017-05-30 RX ADMIN — Medication 20 MILLIGRAM(S): at 06:02

## 2017-05-30 RX ADMIN — PIPERACILLIN AND TAZOBACTAM 25 GRAM(S): 4; .5 INJECTION, POWDER, LYOPHILIZED, FOR SOLUTION INTRAVENOUS at 15:21

## 2017-05-30 RX ADMIN — PANTOPRAZOLE SODIUM 40 MILLIGRAM(S): 20 TABLET, DELAYED RELEASE ORAL at 12:37

## 2017-05-30 RX ADMIN — SODIUM CHLORIDE 75 MILLILITER(S): 9 INJECTION INTRAMUSCULAR; INTRAVENOUS; SUBCUTANEOUS at 00:14

## 2017-05-30 RX ADMIN — Medication 3 MILLILITER(S): at 07:20

## 2017-05-30 RX ADMIN — PIPERACILLIN AND TAZOBACTAM 25 GRAM(S): 4; .5 INJECTION, POWDER, LYOPHILIZED, FOR SOLUTION INTRAVENOUS at 21:37

## 2017-05-30 RX ADMIN — Medication 40 MILLIEQUIVALENT(S): at 09:21

## 2017-05-30 RX ADMIN — Medication 3 MILLILITER(S): at 02:16

## 2017-05-30 RX ADMIN — FLUCONAZOLE 50 MILLIGRAM(S): 150 TABLET ORAL at 14:07

## 2017-05-30 RX ADMIN — PIPERACILLIN AND TAZOBACTAM 25 GRAM(S): 4; .5 INJECTION, POWDER, LYOPHILIZED, FOR SOLUTION INTRAVENOUS at 06:02

## 2017-05-30 RX ADMIN — HEPARIN SODIUM 5000 UNIT(S): 5000 INJECTION INTRAVENOUS; SUBCUTANEOUS at 21:36

## 2017-05-30 RX ADMIN — Medication 20 MILLIGRAM(S): at 19:04

## 2017-05-30 RX ADMIN — Medication 250 MILLIGRAM(S): at 21:37

## 2017-05-30 RX ADMIN — Medication 40 MILLIEQUIVALENT(S): at 14:11

## 2017-05-30 RX ADMIN — Medication 3 MILLILITER(S): at 13:06

## 2017-05-30 NOTE — PROGRESS NOTE ADULT - ASSESSMENT
47M with no significant past medical history admitted on 5/19 for evaluation of lower abdominal pain, decreased appetite and upon admission was found to have sigmoid diverticulitis; patient was medically managed however, on 5/21 patient underwent sigmoid resection, ileocolostomy and currently is post op asking for ice chips. He has decreased urine output and worsening renal function as well as hypotension  1. Post op leukocytosis, hypotension most likely secondary to peritonitis  - coagulase negative staph in blood, post surgery  -started on vancomycin 750 mg iv q 12 hours, given large surgical incision, will continue, day #6  -will increase vancomycin dose  -central line removed  - day #12 zosyn and flagyl, day #9 diflucan  - tolerating antibiotics without rashes or side effects   - iv hydration and supportive care   -pressors now off  - slow improving  - wound care per surgery  -discussed with surgery, renal, and intensivist and nursing care at bedside  Will follow

## 2017-05-30 NOTE — BRIEF OPERATIVE NOTE - POST-OP DX
Abscess of sigmoid colon due to diverticulitis  05/21/2017    Active  Karley Bonds  Perihepatic fluid collection  05/30/2017    Active  Venkatesh Shultz  Small bowel obstruction  05/21/2017    Active  Karley Bonds
Abscess of sigmoid colon due to diverticulitis  05/21/2017    Active  Karley Bonds  Small bowel obstruction  05/21/2017    Active  Karley Bonds

## 2017-05-30 NOTE — PROGRESS NOTE ADULT - SUBJECTIVE AND OBJECTIVE BOX
Subjective:    pat CT abd-moderate pleural effusion, perihepatic ascitic fluid with air, lying in bed comfortably.    MEDICATIONS  (STANDING):  pantoprazole  Injectable 40milliGRAM(s) IV Push daily  heparin  Injectable 5000Unit(s) SubCutaneous every 8 hours  ALBUTerol/ipratropium for Nebulization 3milliLiter(s) Nebulizer every 6 hours  fluconAZOLE IVPB  IV Intermittent   fluconAZOLE IVPB 100milliGRAM(s) IV Intermittent every 24 hours  piperacillin/tazobactam IVPB. 3.375Gram(s) IV Intermittent every 8 hours  vancomycin  IVPB 750milliGRAM(s) IV Intermittent every 12 hours  furosemide   Injectable 20milliGRAM(s) IV Push daily  sodium chloride 0.9%. 1000milliLiter(s) IV Continuous <Continuous>  potassium chloride    Tablet ER 40milliEquivalent(s) Oral every 4 hours    MEDICATIONS  (PRN):  naloxone Injectable 0.1milliGRAM(s) IV Push every 3 minutes PRN For ANY of the following changes in patient status:  A. RR LESS THAN 10 breaths per minute, B. Oxygen saturation LESS THAN 90%, C. Sedation score of 6  ondansetron Injectable 4milliGRAM(s) IV Push every 6 hours PRN Nausea  ondansetron Injectable 4milliGRAM(s) IV Push every 6 hours PRN Nausea  acetaminophen  Suppository 650milliGRAM(s) Rectal every 6 hours PRN For Temp greater than 38.5 C (101.3 F)  morphine  - Injectable 2milliGRAM(s) IV Push every 4 hours PRN Moderate Pain (4 - 6)      Allergies    No Known Allergies    Intolerances        Vital Signs Last 24 Hrs  T(C): 37.4, Max: 37.9 (05-29 @ 21:00)  T(F): 99.3, Max: 100.3 (05-29 @ 21:00)  HR: 68 (68 - 88)  BP: 118/54 (106/57 - 129/54)  BP(mean): 68 (68 - 100)  RR: 17 (16 - 30)  SpO2: 94% (93% - 99%)    PHYSICAL EXAMINATION:    NECK:  Supple. No lymphadenopathy. Jugular venous pressure not elevated. Carotids equal.   HEART:   The cardiac impulse has a normal quality. Reg., Nl S1 and S2.  There are no murmurs, rubs or gallops noted  CHEST:  Chest crackles to auscultation. Normal respiratory effort.  ABDOMEN:  Soft and nontender.   EXTREMITIES:  There is no edema.       LABS:                        11.7   17.8  )-----------( 284      ( 30 May 2017 05:52 )             34.2     05-30    140  |  103  |  15  ----------------------------<  101<H>  3.2<L>   |  29  |  0.86    Ca    7.4<L>      30 May 2017 05:52  Phos  2.0     05-30  Mg     2.4     05-30

## 2017-05-30 NOTE — BRIEF OPERATIVE NOTE - PRE-OP DX
Abscess of sigmoid colon due to diverticulitis  05/21/2017    Active  Karley Bonds  Perihepatic fluid collection  05/30/2017    Active  Venkatesh Shultz
Abscess of sigmoid colon due to diverticulitis  05/21/2017    Active  Karley Bonds

## 2017-05-30 NOTE — PROGRESS NOTE ADULT - SUBJECTIVE AND OBJECTIVE BOX
No c/o. CT A/P images and report reviewed. Jil clear liquids without N/V. No reports of abdominal pain. WBC remains elevated.       MEDICATIONS  (STANDING):  pantoprazole  Injectable 40milliGRAM(s) IV Push daily  heparin  Injectable 5000Unit(s) SubCutaneous every 8 hours  ALBUTerol/ipratropium for Nebulization 3milliLiter(s) Nebulizer every 6 hours  fluconAZOLE IVPB  IV Intermittent   fluconAZOLE IVPB 100milliGRAM(s) IV Intermittent every 24 hours  piperacillin/tazobactam IVPB. 3.375Gram(s) IV Intermittent every 8 hours  vancomycin  IVPB 750milliGRAM(s) IV Intermittent every 12 hours  furosemide   Injectable 20milliGRAM(s) IV Push daily  sodium chloride 0.9%. 1000milliLiter(s) IV Continuous <Continuous>  potassium chloride    Tablet ER 40milliEquivalent(s) Oral every 4 hours    MEDICATIONS  (PRN):  naloxone Injectable 0.1milliGRAM(s) IV Push every 3 minutes PRN For ANY of the following changes in patient status:  A. RR LESS THAN 10 breaths per minute, B. Oxygen saturation LESS THAN 90%, C. Sedation score of 6  ondansetron Injectable 4milliGRAM(s) IV Push every 6 hours PRN Nausea  ondansetron Injectable 4milliGRAM(s) IV Push every 6 hours PRN Nausea  acetaminophen  Suppository 650milliGRAM(s) Rectal every 6 hours PRN For Temp greater than 38.5 C (101.3 F)  morphine  - Injectable 2milliGRAM(s) IV Push every 4 hours PRN Moderate Pain (4 - 6)    Vital Signs Last 24 Hrs  T(C): 37.3, Max: 37.9 (05-29 @ 21:00)  T(F): 99.2, Max: 100.3 (05-29 @ 21:00)  HR: 68 (68 - 88)  BP: 118/54 (106/57 - 129/59)  BP(mean): 68 (68 - 100)  RR: 16 (16 - 30)  SpO2: 93% (93% - 100%)  I&O's Detail    I & Os for current day (as of 30 May 2017 07:42)  =============================================  IN:    sodium chloride 0.9%.: 525 ml    IV PiggyBack: 400 ml    Total IN: 925 ml  ---------------------------------------------  OUT:    Incontinent per Retracted Penis Pouch: 2025 ml    Bulb: 440 ml serosanguineous    Colostomy: 250 ml    Total OUT: 2715 ml  ---------------------------------------------  Total NET: -1790 ml    ABD exam : anasarca, soft, NT, distended                        11.7   17.8  )-----------( 284      ( 30 May 2017 05:52 )             34.2     05-30    140  |  103  |  15  ----------------------------<  101<H>  3.2<L>   |  29  |  0.86    Ca    7.4<L>      30 May 2017 05:52  Phos  2.0     05-30  Mg     2.4     05-30    POD 9 Hartmanns, ileocolic resection    Moderate amount of perihepatic ascites, discussed with IR, to undergo perc drainage for therapeutic and diagnostic purposes. Currently, npo for procedure.  HD stable, no issues.  B/L pleural effusions noted and as expected, Encourage IS and OOB. No additional intervention. D/W Dr. Hernandez.  Resume clear liquids when IR procedure completed.  Hg 11.7, on heparin SQ for DVT prop.  Low grade temp of 100.3, persistent leukocytosis, on Zosyn, Vanc and Diflcuan.  Replete K.

## 2017-05-30 NOTE — BRIEF OPERATIVE NOTE - OPERATION/FINDINGS
perihepatic air-fluid collection. ct guidance. micropuncture access. changed otw or 8fr apd. slightly turbid serous. specimen sent
sigmoid diverticulitis with abscess and involvement of small bowel with phlegmon causing small bowel obstruction.

## 2017-05-30 NOTE — PROGRESS NOTE ADULT - ASSESSMENT
47M admitted for a 24hr h/o progressive abdominal pain that began after a 1-2 day trip to [facility]. CT with acute perforated sigmoid diverticulitis, s/p LAP with resection (Hartmanns/ileocolic resection)POD #9 complicated by septic shock secondary to the above presently off pressor support. Rec'd large of INF for post fluid resuscitation now with anasarca

## 2017-05-30 NOTE — PROGRESS NOTE ADULT - SUBJECTIVE AND OBJECTIVE BOX
47M admitted for a 24hr h/o progressive abdominal pain that began after a 1-2 day trip to [facility]. CT with acute perforated sigmoid diverticulitis, s/p LAP with resection (Hartmanns/ileocolic resection)POD #9 complicated by septic shock secondary to the above presently off pressor support. Rec'd large of INF for post fluid resuscitation now with anasarca  HARDIK/ATN resolved  Continues to have persistent elevated white count and low grade temp. CT c/w/ perihepatic ascites, no obvious abcess or fluid collection identified on CT. For IR percutaneous drainage today.     Adequate UOP. +4 pitting edema with testicular swelling. Deneis SOB.       ICU Vital Signs Last 24 Hrs  T(C): 37.4, Max: 37.9 (05-29 @ 21:00)  T(F): 99.3, Max: 100.3 (05-29 @ 21:00)  HR: 69 (68 - 86)  BP: 112/53 (106/57 - 129/54)  BP(mean): 66 (66 - 100)  ABP: --  ABP(mean): --  RR: 17 (16 - 24)  SpO2: 96% (93% - 99%)          PHYSICAL EXAM:    Constitutional: NAD, awake and alert, well-developed  HEENT: PERR, EOMI, Normal Hearing, MMM  Neck: Soft and supple, No LAD, No JVD  Respiratory: minimal rales to base  Cardiovascular: S1 and S2, regular rate and rhythm, no Murmurs, gallops or rubs  Gastrointestinal: Bowel Sounds present, soft, nontender, nondistended, colostomy bag: stump pink. LUIS M drain: serosanguinous fluid  Extremities: +4 pitting b/l LE with testicular swelling  Vascular: 2+ peripheral pulses  Neurological: A/O x 3, no focal deficits

## 2017-05-30 NOTE — PROGRESS NOTE ADULT - SUBJECTIVE AND OBJECTIVE BOX
HPI:  47M with no significant past medical history admitted on 5/19 for evaluation of lower abdominal pain, decreased appetite and upon admission was found to have sigmoid diverticulitis; patient was medically managed however, on 5/21 patient underwent sigmoid resection, ileocolostomy and currently is post op asking for ice chips. He has decreased urine output and worsening renal function as well as hypotension.  Today 5/23 patient is on pressor support, intermittently febrile  Today 5/24 patient still on pressor support, however, doses coming down, started to have urine output and has been afebrile for greater than 24 hours  Today 5/25 patient now off pressors; sitting in chair, notes overall he is feeling better  Today 5/26 patient off pressors; ambulating earlier  Today 5/30 patient doing well; ngt has been removed        MEDICATIONS  (STANDING):  pantoprazole  Injectable 40milliGRAM(s) IV Push daily  heparin  Injectable 5000Unit(s) SubCutaneous every 8 hours  ALBUTerol/ipratropium for Nebulization 3milliLiter(s) Nebulizer every 6 hours  fluconAZOLE IVPB  IV Intermittent   fluconAZOLE IVPB 100milliGRAM(s) IV Intermittent every 24 hours  piperacillin/tazobactam IVPB. 3.375Gram(s) IV Intermittent every 8 hours  vancomycin  IVPB 750milliGRAM(s) IV Intermittent every 12 hours  potassium chloride    Tablet ER 40milliEquivalent(s) Oral every 4 hours  furosemide   Injectable 20milliGRAM(s) IV Push every 12 hours    MEDICATIONS  (PRN):  naloxone Injectable 0.1milliGRAM(s) IV Push every 3 minutes PRN For ANY of the following changes in patient status:  A. RR LESS THAN 10 breaths per minute, B. Oxygen saturation LESS THAN 90%, C. Sedation score of 6  ondansetron Injectable 4milliGRAM(s) IV Push every 6 hours PRN Nausea  ondansetron Injectable 4milliGRAM(s) IV Push every 6 hours PRN Nausea  acetaminophen  Suppository 650milliGRAM(s) Rectal every 6 hours PRN For Temp greater than 38.5 C (101.3 F)  morphine  - Injectable 2milliGRAM(s) IV Push every 4 hours PRN Moderate Pain (4 - 6)      Vital Signs Last 24 Hrs  T(C): 37.4, Max: 37.9 (05-29 @ 21:00)  T(F): 99.3, Max: 100.3 (05-29 @ 21:00)  HR: 70 (68 - 86)  BP: 112/53 (106/57 - 129/54)  BP(mean): 66 (66 - 100)  RR: 17 (16 - 23)  SpO2: 96% (93% - 99%)    Physical Exam:            Constitutional: frail looking  HEENT: NC/AT, EOMI, PERRLA, ngt in place  Neck: supple  Respiratory: clear  Cardiovascular: S1S2 regular, no murmurs  Abdomen: kimmy drain in place, dressing in place left sided ostomy  Genitourinary: deferred  Rectal: deferred  Musculoskeletal: no muscle tenderness, no joint swelling or tenderness  Neurological: AxOx3, moving all extremities, no focal deficits  Skin: no rashes, mild mottling of lower extremities            Labs:                        12.7   15.8  )-----------( 147      ( 26 May 2017 05:30 )             37.6     05-26    146<H>  |  111<H>  |  28<H>  ----------------------------<  96  4.3   |  28  |  1.05    Ca    7.1<L>      26 May 2017 05:30  Phos  2.4     05-26  Mg     2.6     05-26    TPro  x   /  Alb  1.1<L>  /  TBili  x   /  DBili  x   /  AST  x   /  ALT  x   /  AlkPhos  x   05-26           Cultures: Culture - Blood (05.23.17 @ 11:20)    Gram Stain:   Growth in anaerobic bottle: Gram Positive Cocci in Clusters    Specimen Source: .Blood Blood    Culture Results:   Growth in anaerobic bottle: Coag Negative Staphylococcus  Single set isolate, possible contaminant. Contact  Microbiology if susceptibility testing clinically  indicated.            Radiology:EXAM:  CT ABDOMEN AND PELVIS IC                            PROCEDURE DATE:  05/19/2017        INTERPRETATION:  CLINICAL INFORMATION: 47-year-old man with abdominal   pain assess for appendicitis     TECHNIQUE:    CT of abdomen and pelvis was performed with axial images   were obtained from the diaphragm to pubic symphysis oral and IV contrast.    COMPARISON:  None.    FINDINGS:    Liver: Borderline hepatomegaly with mild fatty infiltration otherwise   within normal limits  Gallbladder: Within normal limits  Bile ducts: No intrahepatic or extrahepatic biliary dilatation  Pancreas: within normal limits    Spleen: within normal limits  Adrenal: within normal limits  Kidneys, Ureters and Bladder: Symmetric enhancement bilaterally. No   perinephric attending or collections. No hydronephrosis. No intrarenal   calculi. Normal caliber of the ureters. Limited unopacified nondistended   bladder.    Pelvis: No pelvic adenopathy or pelvic free fluid.  Small fat-containing   bilateral inguinal hernias.      Bowel: No bowel obstruction.  There are few scattered colonic   diverticula. There is focal thickening of sigmoid colon in the pelvis   associated with mesenteric fat stranding and thickening of adjacent   fascial planes with localized perforation and small air bubbles without   abscess. Findings are consistent with acute rupture diverticulitis. Small   free fluid adjacent to gas filled appendix.    Peritoneum: Small ascites, no organized fluid collections.    Retroperitoneum: within normal limits  Vessels: Patent.    Abdominal wall: Small fat-containing umbilical hernia.    Lower chest and lung Bases: The visualized lung bases clear except for   subsegmental dependent atelectasis. Heart normal in size.   Bones: Degenerative changes.     IMPRESSION:     Focal thickening of sigmoid colon with mesenteric fat stranding and   thickening of fascial planes with scattered adjacent air bubbles   consistent with acute diverticulitis without abscess. Small free fluid in   the abdomen extending to the right quadrant.    EXAM:  CT ABDOMEN AND PELVIS OC IC                            PROCEDURE DATE:  05/29/2017        INTERPRETATION:  CT OF THE ABDOMEN AND PELVIS WITH IV CONTRAST     CLINICAL INDICATION: diverticulitis, sepsis s/p hartmans    TECHNIQUE: CT scan of the abdomen and pelvis was performed from the domes   of the diaphragm to the symphysis pubis with oral and intravenous   contrast.  Intravenous administration of 90 cc of Omnipaque-350, 10 cc   discarded, was without complication.  Sagittal and coronalreformatted   images were provided.    COMPARISON: CT abdomen pelvis May 19, 2017    FINDINGS:   LOWER THORAX: Moderate bilateral pleural effusions with underlying   compressive atelectasis..    LIVER:  Moderate perihepatic ascites and adjacent free air..  GALLBLADDER: Unremarkable.  BILIARY TREE: Unremarkable.  PANCREAS: Unremarkable.  SPLEEN: Unremarkable.  ADRENALS: There is new expansion of the right adrenal gland with   nonsimple fluid/material, measuring approximately 4.0 x 2.6 cm, may   reflect hemorrhage within the right adrenal gland. Left adrenal gland   appears unremarkable.    KIDNEYS/URETERS: Unremarkable.    BOWEL: Status post sigmoid resection with left lower quadrant colostomy   present and suture material at the rectal stump.Additionally, partial   resection and anastomosis in the region of the cecum is present. There is   peripheral luminal air within the ascending bowel wall and cecum. There   is a surgical drainage catheter with tip in the mid pelvis. No focal   fluidcollection or abscess.    PERITONEUM/RETROPERITONEUM: Extensive free air in the upper abdomen,   compatible with recent surgery. Moderate perihepatic ascites and minimal   fluid in the bilateral paracolic gutters is present.    BLADDER: Small amount of air in the bladder, likely reflecting recent   Cooley catheterization.  REPRODUCTIVE ORGANS: Unremarkable.    VASCULATURE: Within normal limits.  ABDOMINAL WALL: Postsurgical changes within the abdominal wall are   present. Diffuse edema of the abdominal wall. Midline surgical staples   present.    BONES: No aggressive osseous lesion.    IMPRESSION:    Status post sigmoid resection with left lower quadrant colostomy present   and suture material at the rectal stump. Additionally, partial resection   and anastomosis in the region of the cecum is present. There is   peripheral luminal air within the ascending bowel wall and cecum. There   is a surgical drainage catheter with tip in the mid pelvis. No focal   fluid collection or abscess. Moderate perihepatic ascites and minimal   fluid in the bilateral paracolic gutters is present.    New expansion of the right adrenal gland with hyperattenuating material,   measuring approximately 4.0 x 2.6 cm, may reflect hemorrhage within the   right adrenal gland.     Moderate bilateral pleural effusions with underlying compressive   atelectasis..    Rest of the findings as above.    Advanced directive addressed: full resuscitation

## 2017-05-30 NOTE — BRIEF OPERATIVE NOTE - PROCEDURE
Drainage, abdomen, percutaneous  05/30/2017    Active  DAXELROD
Ileocolectomy  05/21/2017    Active  KATYA  Small bowel resection, partial  05/21/2017    Active  KATYA  Eliud's procedure  05/21/2017    Active  KATYA

## 2017-05-30 NOTE — PROGRESS NOTE ADULT - ASSESSMENT
PROBLEMS:    Diverticulitis of intestine with perforation S/P LAP  R/o perihepativ collection  Moderate pleural effusion with atelectasis  HYPOXAMIA  ATELECTASIS  COPD    PLAN;    IR drainage of intra-abdomen collection  pleural effusion sympthatic - fu conservatively  iv abx-zosyn/diflucan/vanco  d/w colorectal surgery  AEROSOLS  SUPPORTIVE CARE  DVT PROPHYLASIX

## 2017-05-31 LAB
ANION GAP SERPL CALC-SCNC: 5 MMOL/L — SIGNIFICANT CHANGE UP (ref 5–17)
BUN SERPL-MCNC: 12 MG/DL — SIGNIFICANT CHANGE UP (ref 7–23)
CALCIUM SERPL-MCNC: 7.4 MG/DL — LOW (ref 8.5–10.1)
CHLORIDE SERPL-SCNC: 103 MMOL/L — SIGNIFICANT CHANGE UP (ref 96–108)
CO2 SERPL-SCNC: 30 MMOL/L — SIGNIFICANT CHANGE UP (ref 22–31)
CREAT SERPL-MCNC: 0.85 MG/DL — SIGNIFICANT CHANGE UP (ref 0.5–1.3)
GLUCOSE SERPL-MCNC: 96 MG/DL — SIGNIFICANT CHANGE UP (ref 70–99)
GRAM STN FLD: SIGNIFICANT CHANGE UP
HCT VFR BLD CALC: 32.1 % — LOW (ref 39–50)
HGB BLD-MCNC: 10.7 G/DL — LOW (ref 13–17)
MAGNESIUM SERPL-MCNC: 2.3 MG/DL — SIGNIFICANT CHANGE UP (ref 1.6–2.6)
MCHC RBC-ENTMCNC: 30.1 PG — SIGNIFICANT CHANGE UP (ref 27–34)
MCHC RBC-ENTMCNC: 33.3 GM/DL — SIGNIFICANT CHANGE UP (ref 32–36)
MCV RBC AUTO: 90.3 FL — SIGNIFICANT CHANGE UP (ref 80–100)
NIGHT BLUE STAIN TISS: SIGNIFICANT CHANGE UP
PHOSPHATE SERPL-MCNC: 1.7 MG/DL — LOW (ref 2.5–4.5)
PLATELET # BLD AUTO: 348 K/UL — SIGNIFICANT CHANGE UP (ref 150–400)
POTASSIUM SERPL-MCNC: 3.5 MMOL/L — SIGNIFICANT CHANGE UP (ref 3.5–5.3)
POTASSIUM SERPL-SCNC: 3.5 MMOL/L — SIGNIFICANT CHANGE UP (ref 3.5–5.3)
RBC # BLD: 3.56 M/UL — LOW (ref 4.2–5.8)
RBC # FLD: 13.7 % — SIGNIFICANT CHANGE UP (ref 10.3–14.5)
SODIUM SERPL-SCNC: 138 MMOL/L — SIGNIFICANT CHANGE UP (ref 135–145)
SPECIMEN SOURCE: SIGNIFICANT CHANGE UP
SPECIMEN SOURCE: SIGNIFICANT CHANGE UP
WBC # BLD: 17.4 K/UL — HIGH (ref 3.8–10.5)
WBC # FLD AUTO: 17.4 K/UL — HIGH (ref 3.8–10.5)

## 2017-05-31 RX ORDER — FUROSEMIDE 40 MG
40 TABLET ORAL EVERY 12 HOURS
Qty: 0 | Refills: 0 | Status: DISCONTINUED | OUTPATIENT
Start: 2017-05-31 | End: 2017-06-02

## 2017-05-31 RX ORDER — LANOLIN ALCOHOL/MO/W.PET/CERES
3 CREAM (GRAM) TOPICAL AT BEDTIME
Qty: 0 | Refills: 0 | Status: DISCONTINUED | OUTPATIENT
Start: 2017-05-31 | End: 2017-06-16

## 2017-05-31 RX ORDER — SODIUM,POTASSIUM PHOSPHATES 278-250MG
1 POWDER IN PACKET (EA) ORAL EVERY 4 HOURS
Qty: 0 | Refills: 0 | Status: COMPLETED | OUTPATIENT
Start: 2017-05-31 | End: 2017-05-31

## 2017-05-31 RX ADMIN — Medication 3 MILLIGRAM(S): at 22:09

## 2017-05-31 RX ADMIN — FLUCONAZOLE 50 MILLIGRAM(S): 150 TABLET ORAL at 14:03

## 2017-05-31 RX ADMIN — Medication 1 TABLET(S): at 09:52

## 2017-05-31 RX ADMIN — PANTOPRAZOLE SODIUM 40 MILLIGRAM(S): 20 TABLET, DELAYED RELEASE ORAL at 12:28

## 2017-05-31 RX ADMIN — Medication 3 MILLILITER(S): at 13:36

## 2017-05-31 RX ADMIN — Medication 3 MILLILITER(S): at 09:15

## 2017-05-31 RX ADMIN — HEPARIN SODIUM 5000 UNIT(S): 5000 INJECTION INTRAVENOUS; SUBCUTANEOUS at 22:09

## 2017-05-31 RX ADMIN — PIPERACILLIN AND TAZOBACTAM 25 GRAM(S): 4; .5 INJECTION, POWDER, LYOPHILIZED, FOR SOLUTION INTRAVENOUS at 15:09

## 2017-05-31 RX ADMIN — PIPERACILLIN AND TAZOBACTAM 25 GRAM(S): 4; .5 INJECTION, POWDER, LYOPHILIZED, FOR SOLUTION INTRAVENOUS at 06:47

## 2017-05-31 RX ADMIN — Medication 3 MILLILITER(S): at 19:58

## 2017-05-31 RX ADMIN — Medication 250 MILLIGRAM(S): at 06:47

## 2017-05-31 RX ADMIN — HEPARIN SODIUM 5000 UNIT(S): 5000 INJECTION INTRAVENOUS; SUBCUTANEOUS at 14:03

## 2017-05-31 RX ADMIN — Medication 250 MILLIGRAM(S): at 19:36

## 2017-05-31 RX ADMIN — Medication 1 TABLET(S): at 14:04

## 2017-05-31 RX ADMIN — Medication 20 MILLIGRAM(S): at 06:47

## 2017-05-31 RX ADMIN — Medication 40 MILLIGRAM(S): at 18:56

## 2017-05-31 RX ADMIN — HEPARIN SODIUM 5000 UNIT(S): 5000 INJECTION INTRAVENOUS; SUBCUTANEOUS at 06:47

## 2017-05-31 RX ADMIN — PIPERACILLIN AND TAZOBACTAM 25 GRAM(S): 4; .5 INJECTION, POWDER, LYOPHILIZED, FOR SOLUTION INTRAVENOUS at 22:09

## 2017-05-31 NOTE — PROGRESS NOTE ADULT - ASSESSMENT
47M admitted for a 24hr h/o progressive abdominal pain that began after a 1-2 day trip to [facility]. CT with acute perforated sigmoid diverticulitis, s/p LAP with resection (Hartmanns/ileocolic resection)POD #9 complicated by septic shock secondary to the above presently off pressor support. Rec'd large of INF for post fluid resuscitation now with anasarca

## 2017-05-31 NOTE — PROGRESS NOTE ADULT - ASSESSMENT
PROBLEMS:    Diverticulitis of intestine with perforation S/P LAP  Perihepatic collection- s/p drain  Moderate pleural effusion with atelectasis  HYPOXAMIA  ATELECTASIS  COPD    PLAN;    S/P IR drainage of intra-abdomen collection  pleural effusion sympthatic - fu conservatively  iv abx-zosyn/diflucan/vanco  d/w colorectal surgery  AEROSOLS  SUPPORTIVE CARE  DVT PROPHYLASIX

## 2017-05-31 NOTE — PROGRESS NOTE ADULT - SUBJECTIVE AND OBJECTIVE BOX
HPI:  47M with no significant past medical history admitted on 5/19 for evaluation of lower abdominal pain, decreased appetite and upon admission was found to have sigmoid diverticulitis; patient was medically managed however, on 5/21 patient underwent sigmoid resection, ileocolostomy and currently is post op asking for ice chips. He has decreased urine output and worsening renal function as well as hypotension.  Today 5/23 patient is on pressor support, intermittently febrile  Today 5/24 patient still on pressor support, however, doses coming down, started to have urine output and has been afebrile for greater than 24 hours  Today 5/25 patient now off pressors; sitting in chair, notes overall he is feeling better  Today 5/26 patient off pressors; ambulating earlier  Today 5/30 patient doing well; ngt has been removed  Today 5/31 patient sitting in chair; tolerating diet; had drain placed by IR        MEDICATIONS  (STANDING):  pantoprazole  Injectable 40milliGRAM(s) IV Push daily  heparin  Injectable 5000Unit(s) SubCutaneous every 8 hours  ALBUTerol/ipratropium for Nebulization 3milliLiter(s) Nebulizer every 6 hours  fluconAZOLE IVPB  IV Intermittent   fluconAZOLE IVPB 100milliGRAM(s) IV Intermittent every 24 hours  piperacillin/tazobactam IVPB. 3.375Gram(s) IV Intermittent every 8 hours  furosemide   Injectable 20milliGRAM(s) IV Push every 12 hours  vancomycin  IVPB 1000milliGRAM(s) IV Intermittent every 12 hours  potassium acid phosphate/sodium acid phosphate tablet (K-PHOS No. 2) 1Tablet(s) Oral every 4 hours    MEDICATIONS  (PRN):  naloxone Injectable 0.1milliGRAM(s) IV Push every 3 minutes PRN For ANY of the following changes in patient status:  A. RR LESS THAN 10 breaths per minute, B. Oxygen saturation LESS THAN 90%, C. Sedation score of 6  ondansetron Injectable 4milliGRAM(s) IV Push every 6 hours PRN Nausea  ondansetron Injectable 4milliGRAM(s) IV Push every 6 hours PRN Nausea  acetaminophen  Suppository 650milliGRAM(s) Rectal every 6 hours PRN For Temp greater than 38.5 C (101.3 F)  zolpidem 5milliGRAM(s) Oral at bedtime PRN Insomnia      Vital Signs Last 24 Hrs  T(C): 36.5, Max: 37.8 (05-30 @ 21:40)  T(F): 97.7, Max: 100.1 (05-30 @ 21:40)  HR: 80 (68 - 92)  BP: 136/56 (92/66 - 136/56)  BP(mean): 74 (66 - 117)  RR: 18 (15 - 27)  SpO2: 100% (89% - 100%)    Physical Exam:          Constitutional: frail looking  HEENT: NC/AT, EOMI, PERRLA, ngt in place  Neck: supple  Respiratory: clear  Cardiovascular: S1S2 regular, no murmurs  Abdomen: kimmy drain in place, dressing in place left sided ostomy  Genitourinary: deferred  Rectal: deferred  Musculoskeletal: no muscle tenderness, no joint swelling or tenderness  Neurological: AxOx3, moving all extremities, no focal deficits  Skin: no rashes, mild mottling of lower extremities            Labs:                        10.7   17.4  )-----------( 348      ( 31 May 2017 05:49 )             32.1     05-31    138  |  103  |  12  ----------------------------<  96  3.5   |  30  |  0.85    Ca    7.4<L>      31 May 2017 05:49  Phos  1.7     05-31  Mg     2.3     05-31             Cultures:          Cultures: Culture - Blood (05.23.17 @ 11:20)    Gram Stain:   Growth in anaerobic bottle: Gram Positive Cocci in Clusters    Specimen Source: .Blood Blood    Culture Results:   Growth in anaerobic bottle: Coag Negative Staphylococcus  Single set isolate, possible contaminant. Contact  Microbiology if susceptibility testing clinically  indicated.            Radiology:EXAM:  CT ABDOMEN AND PELVIS IC                            PROCEDURE DATE:  05/19/2017        INTERPRETATION:  CLINICAL INFORMATION: 47-year-old man with abdominal   pain assess for appendicitis     TECHNIQUE:    CT of abdomen and pelvis was performed with axial images   were obtained from the diaphragm to pubic symphysis oral and IV contrast.    COMPARISON:  None.    FINDINGS:    Liver: Borderline hepatomegaly with mild fatty infiltration otherwise   within normal limits  Gallbladder: Within normal limits  Bile ducts: No intrahepatic or extrahepatic biliary dilatation  Pancreas: within normal limits    Spleen: within normal limits  Adrenal: within normal limits  Kidneys, Ureters and Bladder: Symmetric enhancement bilaterally. No   perinephric attending or collections. No hydronephrosis. No intrarenal   calculi. Normal caliber of the ureters. Limited unopacified nondistended   bladder.    Pelvis: No pelvic adenopathy or pelvic free fluid.  Small fat-containing   bilateral inguinal hernias.      Bowel: No bowel obstruction.  There are few scattered colonic   diverticula. There is focal thickening of sigmoid colon in the pelvis   associated with mesenteric fat stranding and thickening of adjacent   fascial planes with localized perforation and small air bubbles without   abscess. Findings are consistent with acute rupture diverticulitis. Small   free fluid adjacent to gas filled appendix.    Peritoneum: Small ascites, no organized fluid collections.    Retroperitoneum: within normal limits  Vessels: Patent.    Abdominal wall: Small fat-containing umbilical hernia.    Lower chest and lung Bases: The visualized lung bases clear except for   subsegmental dependent atelectasis. Heart normal in size.   Bones: Degenerative changes.     IMPRESSION:     Focal thickening of sigmoid colon with mesenteric fat stranding and   thickening of fascial planes with scattered adjacent air bubbles   consistent with acute diverticulitis without abscess. Small free fluid in   the abdomen extending to the right quadrant.    EXAM:  CT ABDOMEN AND PELVIS OC IC                            PROCEDURE DATE:  05/29/2017        INTERPRETATION:  CT OF THE ABDOMEN AND PELVIS WITH IV CONTRAST     CLINICAL INDICATION: diverticulitis, sepsis s/p hartmans    TECHNIQUE: CT scan of the abdomen and pelvis was performed from the domes   of the diaphragm to the symphysis pubis with oral and intravenous   contrast.  Intravenous administration of 90 cc of Omnipaque-350, 10 cc   discarded, was without complication.  Sagittal and coronalreformatted   images were provided.    COMPARISON: CT abdomen pelvis May 19, 2017    FINDINGS:   LOWER THORAX: Moderate bilateral pleural effusions with underlying   compressive atelectasis..    LIVER:  Moderate perihepatic ascites and adjacent free air..  GALLBLADDER: Unremarkable.  BILIARY TREE: Unremarkable.  PANCREAS: Unremarkable.  SPLEEN: Unremarkable.  ADRENALS: There is new expansion of the right adrenal gland with   nonsimple fluid/material, measuring approximately 4.0 x 2.6 cm, may   reflect hemorrhage within the right adrenal gland. Left adrenal gland   appears unremarkable.    KIDNEYS/URETERS: Unremarkable.    BOWEL: Status post sigmoid resection with left lower quadrant colostomy   present and suture material at the rectal stump.Additionally, partial   resection and anastomosis in the region of the cecum is present. There is   peripheral luminal air within the ascending bowel wall and cecum. There   is a surgical drainage catheter with tip in the mid pelvis. No focal   fluidcollection or abscess.    PERITONEUM/RETROPERITONEUM: Extensive free air in the upper abdomen,   compatible with recent surgery. Moderate perihepatic ascites and minimal   fluid in the bilateral paracolic gutters is present.    BLADDER: Small amount of air in the bladder, likely reflecting recent   Cooley catheterization.  REPRODUCTIVE ORGANS: Unremarkable.    VASCULATURE: Within normal limits.  ABDOMINAL WALL: Postsurgical changes within the abdominal wall are   present. Diffuse edema of the abdominal wall. Midline surgical staples   present.    BONES: No aggressive osseous lesion.    IMPRESSION:    Status post sigmoid resection with left lower quadrant colostomy present   and suture material at the rectal stump. Additionally, partial resection   and anastomosis in the region of the cecum is present. There is   peripheral luminal air within the ascending bowel wall and cecum. There   is a surgical drainage catheter with tip in the mid pelvis. No focal   fluid collection or abscess. Moderate perihepatic ascites and minimal   fluid in the bilateral paracolic gutters is present.    New expansion of the right adrenal gland with hyperattenuating material,   measuring approximately 4.0 x 2.6 cm, may reflect hemorrhage within the   right adrenal gland.     Moderate bilateral pleural effusions with underlying compressive   atelectasis..    Rest of the findings as above.    Advanced directive addressed: full resuscitation

## 2017-05-31 NOTE — PROGRESS NOTE ADULT - SUBJECTIVE AND OBJECTIVE BOX
Subjective:    pat no new complaint, s/p R  perihepatic collection.    MEDICATIONS  (STANDING):  pantoprazole  Injectable 40milliGRAM(s) IV Push daily  heparin  Injectable 5000Unit(s) SubCutaneous every 8 hours  ALBUTerol/ipratropium for Nebulization 3milliLiter(s) Nebulizer every 6 hours  fluconAZOLE IVPB  IV Intermittent   fluconAZOLE IVPB 100milliGRAM(s) IV Intermittent every 24 hours  piperacillin/tazobactam IVPB. 3.375Gram(s) IV Intermittent every 8 hours  furosemide   Injectable 20milliGRAM(s) IV Push every 12 hours  vancomycin  IVPB 1000milliGRAM(s) IV Intermittent every 12 hours  potassium acid phosphate/sodium acid phosphate tablet (K-PHOS No. 2) 1Tablet(s) Oral every 4 hours    MEDICATIONS  (PRN):  naloxone Injectable 0.1milliGRAM(s) IV Push every 3 minutes PRN For ANY of the following changes in patient status:  A. RR LESS THAN 10 breaths per minute, B. Oxygen saturation LESS THAN 90%, C. Sedation score of 6  ondansetron Injectable 4milliGRAM(s) IV Push every 6 hours PRN Nausea  ondansetron Injectable 4milliGRAM(s) IV Push every 6 hours PRN Nausea  acetaminophen  Suppository 650milliGRAM(s) Rectal every 6 hours PRN For Temp greater than 38.5 C (101.3 F)  zolpidem 5milliGRAM(s) Oral at bedtime PRN Insomnia      Allergies    No Known Allergies    Intolerances        Vital Signs Last 24 Hrs  T(C): 36.5, Max: 37.8 (05-30 @ 21:40)  T(F): 97.7, Max: 100.1 (05-30 @ 21:40)  HR: 90 (68 - 92)  BP: 114/67 (92/66 - 136/56)  BP(mean): 71 (66 - 117)  RR: 24 (15 - 27)  SpO2: 95% (89% - 100%)    PHYSICAL EXAMINATION:    NECK:  Supple. No lymphadenopathy. Jugular venous pressure not elevated. Carotids equal.   HEART:   The cardiac impulse has a normal quality. Reg., Nl S1 and S2.  There are no murmurs, rubs or gallops noted  CHEST:  Chest is clear to auscultation. Normal respiratory effort.  ABDOMEN:  Soft and nontender.   EXTREMITIES:  There is no edema.       LABS:                        10.7   17.4  )-----------( 348      ( 31 May 2017 05:49 )             32.1     05-31    138  |  103  |  12  ----------------------------<  96  3.5   |  30  |  0.85    Ca    7.4<L>      31 May 2017 05:49  Phos  1.7     05-31  Mg     2.3     05-31

## 2017-05-31 NOTE — PROGRESS NOTE ADULT - SUBJECTIVE AND OBJECTIVE BOX
No c/o. IR drainage procedure yest with 15cc slightly turbid, serous fluid removed, sent for gs and cx. Jil clear liquids without N/V/abd pain.    MEDICATIONS  (STANDING):  pantoprazole  Injectable 40milliGRAM(s) IV Push daily  heparin  Injectable 5000Unit(s) SubCutaneous every 8 hours  ALBUTerol/ipratropium for Nebulization 3milliLiter(s) Nebulizer every 6 hours  fluconAZOLE IVPB  IV Intermittent   fluconAZOLE IVPB 100milliGRAM(s) IV Intermittent every 24 hours  piperacillin/tazobactam IVPB. 3.375Gram(s) IV Intermittent every 8 hours  furosemide   Injectable 20milliGRAM(s) IV Push every 12 hours  vancomycin  IVPB 1000milliGRAM(s) IV Intermittent every 12 hours    MEDICATIONS  (PRN):  naloxone Injectable 0.1milliGRAM(s) IV Push every 3 minutes PRN For ANY of the following changes in patient status:  A. RR LESS THAN 10 breaths per minute, B. Oxygen saturation LESS THAN 90%, C. Sedation score of 6  ondansetron Injectable 4milliGRAM(s) IV Push every 6 hours PRN Nausea  ondansetron Injectable 4milliGRAM(s) IV Push every 6 hours PRN Nausea  acetaminophen  Suppository 650milliGRAM(s) Rectal every 6 hours PRN For Temp greater than 38.5 C (101.3 F)  zolpidem 5milliGRAM(s) Oral at bedtime PRN Insomnia    Vital Signs Last 24 Hrs  T(C): 37.5, Max: 37.8 (05-30 @ 21:40)  T(F): 99.5, Max: 100.1 (05-30 @ 21:40)  HR: 72 (68 - 92)  BP: 136/56 (92/66 - 136/56)  BP(mean): 74 (66 - 117)  RR: 18 (15 - 27)  SpO2: 96% (89% - 100%)  I&O's Detail    I & Os for current day (as of 31 May 2017 07:56)  =============================================  IN:    IV PiggyBack: 775 ml    Oral Fluid: 480 ml    sodium chloride 0.9%: 450 ml    Other: 300 ml    Total IN: 2005 ml  ---------------------------------------------  OUT:    Incontinent per Retracted Penis Pouch: 1800 ml    Voided: 600 ml    LUIS M Bulb: 410 ml serosanguineous    Colostomy: 350 ml    IR drain : 45 ml serous    Total OUT: 3205 ml  ---------------------------------------------  Total NET: -1200 ml    ABD exam :incision CDI, stoma productive, anasarca, soft, minimally tender, moderately distended                        10.7   17.4  )-----------( 348      ( 31 May 2017 05:49 )             32.1     05-31    138  |  103  |  12  ----------------------------<  96  3.5   |  30  |  0.85    Ca    7.4<L>      31 May 2017 05:49  Phos  1.7     05-31  Mg     2.3     05-31    POD 10     Advance to full liquid diet.   Await results of IR cultures.  Afebrile, WBC remains 17, on Zosyn, Vanc and Diflucan.

## 2017-05-31 NOTE — PROGRESS NOTE ADULT - SUBJECTIVE AND OBJECTIVE BOX
47M admitted for a 24hr h/o progressive abdominal pain that began after a 1-2 day trip to [facility]. CT with acute perforated sigmoid diverticulitis, s/p LAP with resection (Hartmanns/ileocolic resection)POD #9 complicated by septic shock secondary to the above presently off pressor support. Rec'd large of INF for post fluid resuscitation now with anasarca  HARDIK/ATN resolved  Continues to have persistent elevated white count and low grade temp. CT c/w/ perihepatic ascites, no obvious abcess or fluid collection identified on CT. For IR percutaneous drainage today.     Adequate UOP. +4 pitting edema with testicular swelling. Denies SOB.     5/31: denies sob, good uop. s/p drainage of abd fluid collection       ICU Vital Signs Last 24 Hrs  T(C): 37.4, Max: 37.9 (05-29 @ 21:00)  T(F): 99.3, Max: 100.3 (05-29 @ 21:00)  HR: 69 (68 - 86)  BP: 112/53 (106/57 - 129/54)  BP(mean): 66 (66 - 100)  ABP: --  ABP(mean): --  RR: 17 (16 - 24)  SpO2: 96% (93% - 99%)          PHYSICAL EXAM:    Constitutional: NAD, awake and alert, well-developed  HEENT: PERR, EOMI, Normal Hearing, MMM  Neck: Soft and supple, No LAD, No JVD  Respiratory: minimal rales to base  Cardiovascular: S1 and S2, regular rate and rhythm, no Murmurs, gallops or rubs  Gastrointestinal: Bowel Sounds present, soft, nontender, nondistended, colostomy bag: stump pink. LUIS M drain: serosanguinous fluid  Extremities: +4 pitting b/l LE with testicular swelling  Vascular: 2+ peripheral pulses  Neurological: A/O x 3, no focal deficits

## 2017-05-31 NOTE — PROGRESS NOTE ADULT - ASSESSMENT
47M with no significant past medical history admitted on 5/19 for evaluation of lower abdominal pain, decreased appetite and upon admission was found to have sigmoid diverticulitis; patient was medically managed however, on 5/21 patient underwent sigmoid resection, ileocolostomy and currently is post op asking for ice chips. He has decreased urine output and worsening renal function as well as hypotension  1. Post op leukocytosis, hypotension most likely secondary to peritonitis  - coagulase negative staph in blood, post surgery  -started on vancomycin 750 mg iv q 12 hours, given large surgical incision, will continue, day #7  -will increase vancomycin dose  -central line removed  - day #13 zosyn, day #10 diflucan  - tolerating antibiotics without rashes or side effects   - iv hydration and supportive care   -pressors now off  - slow improving  - wound care per surgery  - follow up cultures from most recent drain placed  Will follow

## 2017-06-01 LAB
ANION GAP SERPL CALC-SCNC: 5 MMOL/L — SIGNIFICANT CHANGE UP (ref 5–17)
BUN SERPL-MCNC: 9 MG/DL — SIGNIFICANT CHANGE UP (ref 7–23)
CALCIUM SERPL-MCNC: 7.5 MG/DL — LOW (ref 8.5–10.1)
CHLORIDE SERPL-SCNC: 101 MMOL/L — SIGNIFICANT CHANGE UP (ref 96–108)
CO2 SERPL-SCNC: 30 MMOL/L — SIGNIFICANT CHANGE UP (ref 22–31)
CREAT SERPL-MCNC: 0.78 MG/DL — SIGNIFICANT CHANGE UP (ref 0.5–1.3)
GLUCOSE SERPL-MCNC: 89 MG/DL — SIGNIFICANT CHANGE UP (ref 70–99)
HCT VFR BLD CALC: 30.7 % — LOW (ref 39–50)
HGB BLD-MCNC: 10.3 G/DL — LOW (ref 13–17)
MAGNESIUM SERPL-MCNC: 2.1 MG/DL — SIGNIFICANT CHANGE UP (ref 1.6–2.6)
MCHC RBC-ENTMCNC: 30 PG — SIGNIFICANT CHANGE UP (ref 27–34)
MCHC RBC-ENTMCNC: 33.6 GM/DL — SIGNIFICANT CHANGE UP (ref 32–36)
MCV RBC AUTO: 89.3 FL — SIGNIFICANT CHANGE UP (ref 80–100)
PHOSPHATE SERPL-MCNC: 2.1 MG/DL — LOW (ref 2.5–4.5)
PLATELET # BLD AUTO: 348 K/UL — SIGNIFICANT CHANGE UP (ref 150–400)
POTASSIUM SERPL-MCNC: 3.3 MMOL/L — LOW (ref 3.5–5.3)
POTASSIUM SERPL-SCNC: 3.3 MMOL/L — LOW (ref 3.5–5.3)
RBC # BLD: 3.44 M/UL — LOW (ref 4.2–5.8)
RBC # FLD: 13.5 % — SIGNIFICANT CHANGE UP (ref 10.3–14.5)
SODIUM SERPL-SCNC: 136 MMOL/L — SIGNIFICANT CHANGE UP (ref 135–145)
WBC # BLD: 14.7 K/UL — HIGH (ref 3.8–10.5)
WBC # FLD AUTO: 14.7 K/UL — HIGH (ref 3.8–10.5)

## 2017-06-01 RX ORDER — POTASSIUM CHLORIDE 20 MEQ
10 PACKET (EA) ORAL
Qty: 0 | Refills: 0 | Status: COMPLETED | OUTPATIENT
Start: 2017-06-01 | End: 2017-06-01

## 2017-06-01 RX ORDER — POTASSIUM PHOSPHATE, MONOBASIC POTASSIUM PHOSPHATE, DIBASIC 236; 224 MG/ML; MG/ML
15 INJECTION, SOLUTION INTRAVENOUS ONCE
Qty: 0 | Refills: 0 | Status: COMPLETED | OUTPATIENT
Start: 2017-06-01 | End: 2017-06-01

## 2017-06-01 RX ADMIN — PANTOPRAZOLE SODIUM 40 MILLIGRAM(S): 20 TABLET, DELAYED RELEASE ORAL at 12:51

## 2017-06-01 RX ADMIN — Medication 3 MILLILITER(S): at 02:28

## 2017-06-01 RX ADMIN — PIPERACILLIN AND TAZOBACTAM 25 GRAM(S): 4; .5 INJECTION, POWDER, LYOPHILIZED, FOR SOLUTION INTRAVENOUS at 05:54

## 2017-06-01 RX ADMIN — Medication 100 MILLIEQUIVALENT(S): at 12:50

## 2017-06-01 RX ADMIN — Medication 40 MILLIGRAM(S): at 17:25

## 2017-06-01 RX ADMIN — POTASSIUM PHOSPHATE, MONOBASIC POTASSIUM PHOSPHATE, DIBASIC 62.5 MILLIMOLE(S): 236; 224 INJECTION, SOLUTION INTRAVENOUS at 10:12

## 2017-06-01 RX ADMIN — HEPARIN SODIUM 5000 UNIT(S): 5000 INJECTION INTRAVENOUS; SUBCUTANEOUS at 21:11

## 2017-06-01 RX ADMIN — Medication 100 MILLIEQUIVALENT(S): at 15:22

## 2017-06-01 RX ADMIN — Medication 3 MILLILITER(S): at 19:58

## 2017-06-01 RX ADMIN — Medication 250 MILLIGRAM(S): at 05:55

## 2017-06-01 RX ADMIN — HEPARIN SODIUM 5000 UNIT(S): 5000 INJECTION INTRAVENOUS; SUBCUTANEOUS at 05:55

## 2017-06-01 RX ADMIN — HEPARIN SODIUM 5000 UNIT(S): 5000 INJECTION INTRAVENOUS; SUBCUTANEOUS at 13:32

## 2017-06-01 RX ADMIN — Medication 40 MILLIGRAM(S): at 05:55

## 2017-06-01 NOTE — PROGRESS NOTE ADULT - ASSESSMENT
47M with no significant past medical history admitted on 5/19 for evaluation of lower abdominal pain, decreased appetite and upon admission was found to have sigmoid diverticulitis; patient was medically managed however, on 5/21 patient underwent sigmoid resection, ileocolostomy and currently is post op asking for ice chips. He has decreased urine output and worsening renal function as well as hypotension  1. Post op leukocytosis, hypotension most likely secondary to peritonitis  - coagulase negative staph in blood, post surgery  -started on vancomycin 750 mg iv q 12 hours, given large surgical incision, will continue, day #8  - day #14 zosyn, day #11 diflucan  - tolerating antibiotics without rashes or side effects   - iv hydration and supportive care   -pressors now off  - slow improving  -will stop antibiotics today and observe  Will follow

## 2017-06-01 NOTE — PROGRESS NOTE ADULT - SUBJECTIVE AND OBJECTIVE BOX
No major complaints  no n/v    Vital Signs Last 24 Hrs  T(C): 36.8, Max: 37.6 (06-01 @ 05:54)  T(F): 98.3, Max: 99.7 (06-01 @ 05:54)  HR: 77 (66 - 85)  BP: 113/56 (97/66 - 123/54)  BP(mean): 68 (62 - 70)  RR: 18 (16 - 27)  SpO2: 95% (94% - 100%)    NAD  abd soft, ostomy with minimal output                          10.3   14.7  )-----------( 348      ( 01 Jun 2017 05:51 )             30.7     06-01    136  |  101  |  9   ----------------------------<  89  3.3<L>   |  30  |  0.78    Ca    7.5<L>      01 Jun 2017 05:51  Phos  2.1     06-01  Mg     2.1     06-01

## 2017-06-01 NOTE — PROGRESS NOTE ADULT - ASSESSMENT
PROBLEMS:    Diverticulitis of intestine with perforation S/P LAP  Perihepatic collection- s/p drain  Moderate pleural effusion with atelectasis  HYPOXAMIA  ATELECTASIS  COPD    PLAN;    pulmonary stable  S/P IR drainage of intra-abdomen collection  pleural effusion sympthatic - fu conservatively  iv abx-zosyn/diflucan/vanco  d/w colorectal surgery  AEROSOLS  SUPPORTIVE CARE  DVT PROPHYLASIX

## 2017-06-01 NOTE — PROGRESS NOTE ADULT - SUBJECTIVE AND OBJECTIVE BOX
Subjective:    pat no new complaint, lying comfortably.    MEDICATIONS  (STANDING):  pantoprazole  Injectable 40milliGRAM(s) IV Push daily  heparin  Injectable 5000Unit(s) SubCutaneous every 8 hours  ALBUTerol/ipratropium for Nebulization 3milliLiter(s) Nebulizer every 6 hours  fluconAZOLE IVPB  IV Intermittent   fluconAZOLE IVPB 100milliGRAM(s) IV Intermittent every 24 hours  piperacillin/tazobactam IVPB. 3.375Gram(s) IV Intermittent every 8 hours  vancomycin  IVPB 1000milliGRAM(s) IV Intermittent every 12 hours  furosemide   Injectable 40milliGRAM(s) IV Push every 12 hours  potassium phosphate IVPB 15milliMole(s) IV Intermittent once  potassium chloride  10 mEq/100 mL IVPB 10milliEquivalent(s) IV Intermittent every 1 hour    MEDICATIONS  (PRN):  naloxone Injectable 0.1milliGRAM(s) IV Push every 3 minutes PRN For ANY of the following changes in patient status:  A. RR LESS THAN 10 breaths per minute, B. Oxygen saturation LESS THAN 90%, C. Sedation score of 6  ondansetron Injectable 4milliGRAM(s) IV Push every 6 hours PRN Nausea  ondansetron Injectable 4milliGRAM(s) IV Push every 6 hours PRN Nausea  acetaminophen  Suppository 650milliGRAM(s) Rectal every 6 hours PRN For Temp greater than 38.5 C (101.3 F)  zolpidem 5milliGRAM(s) Oral at bedtime PRN Insomnia  melatonin Oral Tab/Cap - Peds 3milliGRAM(s) Oral at bedtime PRN Insomnia      Allergies    No Known Allergies    Intolerances        Vital Signs Last 24 Hrs  T(C): 36.8, Max: 37.6 (06-01 @ 05:54)  T(F): 98.3, Max: 99.7 (06-01 @ 05:54)  HR: 68 (66 - 90)  BP: 113/56 (97/66 - 123/54)  BP(mean): 68 (62 - 72)  RR: 18 (16 - 27)  SpO2: 95% (94% - 100%)    PHYSICAL EXAMINATION:    NECK:  Supple. No lymphadenopathy. Jugular venous pressure not elevated. Carotids equal.   HEART:   The cardiac impulse has a normal quality. Reg., Nl S1 and S2.  There are no murmurs, rubs or gallops noted  CHEST:  Chest cracles to auscultation. Normal respiratory effort.  ABDOMEN:  Soft and nontender.   EXTREMITIES:  There is no edema.       LABS:                        10.3   14.7  )-----------( 348      ( 01 Jun 2017 05:51 )             30.7     06-01    136  |  101  |  9   ----------------------------<  89  3.3<L>   |  30  |  0.78    Ca    7.5<L>      01 Jun 2017 05:51  Phos  2.1     06-01  Mg     2.1     06-01

## 2017-06-01 NOTE — PROGRESS NOTE ADULT - ASSESSMENT
47M admitted for a 24hr h/o progressive abdominal pain that began after a 1-2 day trip to [facility]. CT with acute perforated sigmoid diverticulitis, s/p LAP with resection (Hartmanns/ileocolic resection)POD #9 complicated by septic shock secondary to the above presently off pressor support. Rec'd large of INF for post fluid resuscitation now with anasarca

## 2017-06-01 NOTE — PROGRESS NOTE ADULT - SUBJECTIVE AND OBJECTIVE BOX
HPI:  47M with no significant past medical history admitted on 5/19 for evaluation of lower abdominal pain, decreased appetite and upon admission was found to have sigmoid diverticulitis; patient was medically managed however, on 5/21 patient underwent sigmoid resection, ileocolostomy and currently is post op asking for ice chips. He has decreased urine output and worsening renal function as well as hypotension.  Today 5/23 patient is on pressor support, intermittently febrile  Today 5/24 patient still on pressor support, however, doses coming down, started to have urine output and has been afebrile for greater than 24 hours  Today 5/25 patient now off pressors; sitting in chair, notes overall he is feeling better  Today 5/26 patient off pressors; ambulating earlier  Today 5/30 patient doing well; ngt has been removed  Today 5/31 patient sitting in chair; tolerating diet; had drain placed by IR  Today 6/1 patient has no complaints, tolerating diet, afebrile        MEDICATIONS  (STANDING):  pantoprazole  Injectable 40milliGRAM(s) IV Push daily  heparin  Injectable 5000Unit(s) SubCutaneous every 8 hours  ALBUTerol/ipratropium for Nebulization 3milliLiter(s) Nebulizer every 6 hours  fluconAZOLE IVPB  IV Intermittent   fluconAZOLE IVPB 100milliGRAM(s) IV Intermittent every 24 hours  piperacillin/tazobactam IVPB. 3.375Gram(s) IV Intermittent every 8 hours  vancomycin  IVPB 1000milliGRAM(s) IV Intermittent every 12 hours  furosemide   Injectable 40milliGRAM(s) IV Push every 12 hours  potassium chloride  10 mEq/100 mL IVPB 10milliEquivalent(s) IV Intermittent every 1 hour    MEDICATIONS  (PRN):  naloxone Injectable 0.1milliGRAM(s) IV Push every 3 minutes PRN For ANY of the following changes in patient status:  A. RR LESS THAN 10 breaths per minute, B. Oxygen saturation LESS THAN 90%, C. Sedation score of 6  ondansetron Injectable 4milliGRAM(s) IV Push every 6 hours PRN Nausea  ondansetron Injectable 4milliGRAM(s) IV Push every 6 hours PRN Nausea  acetaminophen  Suppository 650milliGRAM(s) Rectal every 6 hours PRN For Temp greater than 38.5 C (101.3 F)  zolpidem 5milliGRAM(s) Oral at bedtime PRN Insomnia  melatonin Oral Tab/Cap - Peds 3milliGRAM(s) Oral at bedtime PRN Insomnia      Vital Signs Last 24 Hrs  T(C): 36.8, Max: 37.6 (06-01 @ 05:54)  T(F): 98.3, Max: 99.7 (06-01 @ 05:54)  HR: 77 (66 - 85)  BP: 113/56 (97/66 - 123/54)  BP(mean): 68 (62 - 70)  RR: 18 (16 - 27)  SpO2: 95% (94% - 100%)    Physical Exam:              Constitutional: well developed  HEENT: NC/AT, EOMI, PERRLA  Neck: supple  Respiratory: clear  Cardiovascular: S1S2 regular, no murmurs  Abdomen: kimmy drain in place, dressing in place left sided ostomy  Genitourinary: deferred  Rectal: deferred  Musculoskeletal: no muscle tenderness, no joint swelling or tenderness  Neurological: AxOx3, moving all extremities, no focal deficits  Skin: no rashes            Labs:Labs:                        10.3   14.7  )-----------( 348      ( 01 Jun 2017 05:51 )             30.7     06-01    136  |  101  |  9   ----------------------------<  89  3.3<L>   |  30  |  0.78    Ca    7.5<L>      01 Jun 2017 05:51  Phos  2.1     06-01  Mg     2.1     06-01             Cultures:                         10.7   17.4  )-----------( 348      ( 31 May 2017 05:49 )             32.1     05-31    138  |  103  |  12  ----------------------------<  96  3.5   |  30  |  0.85    Ca    7.4<L>      31 May 2017 05:49  Phos  1.7     05-31  Mg     2.3     05-31             Cultures:          Cultures: Culture - Blood (05.23.17 @ 11:20)    Gram Stain:   Growth in anaerobic bottle: Gram Positive Cocci in Clusters    Specimen Source: .Blood Blood    Culture Results:   Growth in anaerobic bottle: Coag Negative Staphylococcus  Single set isolate, possible contaminant. Contact  Microbiology if susceptibility testing clinically  indicated.    Culture - Body Fluid with Gram Stain (05.30.17 @ 16:30)    Gram Stain:   polymorphonuclear leukocytes seen  No organisms seen  by cytocentrifuge    Specimen Source: Abdominal Fl None    Culture Results:   No growth to date            Radiology:EXAM:  CT ABDOMEN AND PELVIS IC                            PROCEDURE DATE:  05/19/2017        INTERPRETATION:  CLINICAL INFORMATION: 47-year-old man with abdominal   pain assess for appendicitis     TECHNIQUE:    CT of abdomen and pelvis was performed with axial images   were obtained from the diaphragm to pubic symphysis oral and IV contrast.    COMPARISON:  None.    FINDINGS:    Liver: Borderline hepatomegaly with mild fatty infiltration otherwise   within normal limits  Gallbladder: Within normal limits  Bile ducts: No intrahepatic or extrahepatic biliary dilatation  Pancreas: within normal limits    Spleen: within normal limits  Adrenal: within normal limits  Kidneys, Ureters and Bladder: Symmetric enhancement bilaterally. No   perinephric attending or collections. No hydronephrosis. No intrarenal   calculi. Normal caliber of the ureters. Limited unopacified nondistended   bladder.    Pelvis: No pelvic adenopathy or pelvic free fluid.  Small fat-containing   bilateral inguinal hernias.      Bowel: No bowel obstruction.  There are few scattered colonic   diverticula. There is focal thickening of sigmoid colon in the pelvis   associated with mesenteric fat stranding and thickening of adjacent   fascial planes with localized perforation and small air bubbles without   abscess. Findings are consistent with acute rupture diverticulitis. Small   free fluid adjacent to gas filled appendix.    Peritoneum: Small ascites, no organized fluid collections.    Retroperitoneum: within normal limits  Vessels: Patent.    Abdominal wall: Small fat-containing umbilical hernia.    Lower chest and lung Bases: The visualized lung bases clear except for   subsegmental dependent atelectasis. Heart normal in size.   Bones: Degenerative changes.     IMPRESSION:     Focal thickening of sigmoid colon with mesenteric fat stranding and   thickening of fascial planes with scattered adjacent air bubbles   consistent with acute diverticulitis without abscess. Small free fluid in   the abdomen extending to the right quadrant.    EXAM:  CT ABDOMEN AND PELVIS OC IC                            PROCEDURE DATE:  05/29/2017        INTERPRETATION:  CT OF THE ABDOMEN AND PELVIS WITH IV CONTRAST     CLINICAL INDICATION: diverticulitis, sepsis s/p hartmans    TECHNIQUE: CT scan of the abdomen and pelvis was performed from the domes   of the diaphragm to the symphysis pubis with oral and intravenous   contrast.  Intravenous administration of 90 cc of Omnipaque-350, 10 cc   discarded, was without complication.  Sagittal and coronalreformatted   images were provided.    COMPARISON: CT abdomen pelvis May 19, 2017    FINDINGS:   LOWER THORAX: Moderate bilateral pleural effusions with underlying   compressive atelectasis..    LIVER:  Moderate perihepatic ascites and adjacent free air..  GALLBLADDER: Unremarkable.  BILIARY TREE: Unremarkable.  PANCREAS: Unremarkable.  SPLEEN: Unremarkable.  ADRENALS: There is new expansion of the right adrenal gland with   nonsimple fluid/material, measuring approximately 4.0 x 2.6 cm, may   reflect hemorrhage within the right adrenal gland. Left adrenal gland   appears unremarkable.    KIDNEYS/URETERS: Unremarkable.    BOWEL: Status post sigmoid resection with left lower quadrant colostomy   present and suture material at the rectal stump.Additionally, partial   resection and anastomosis in the region of the cecum is present. There is   peripheral luminal air within the ascending bowel wall and cecum. There   is a surgical drainage catheter with tip in the mid pelvis. No focal   fluidcollection or abscess.    PERITONEUM/RETROPERITONEUM: Extensive free air in the upper abdomen,   compatible with recent surgery. Moderate perihepatic ascites and minimal   fluid in the bilateral paracolic gutters is present.    BLADDER: Small amount of air in the bladder, likely reflecting recent   Cooley catheterization.  REPRODUCTIVE ORGANS: Unremarkable.    VASCULATURE: Within normal limits.  ABDOMINAL WALL: Postsurgical changes within the abdominal wall are   present. Diffuse edema of the abdominal wall. Midline surgical staples   present.    BONES: No aggressive osseous lesion.    IMPRESSION:    Status post sigmoid resection with left lower quadrant colostomy present   and suture material at the rectal stump. Additionally, partial resection   and anastomosis in the region of the cecum is present. There is   peripheral luminal air within the ascending bowel wall and cecum. There   is a surgical drainage catheter with tip in the mid pelvis. No focal   fluid collection or abscess. Moderate perihepatic ascites and minimal   fluid in the bilateral paracolic gutters is present.    New expansion of the right adrenal gland with hyperattenuating material,   measuring approximately 4.0 x 2.6 cm, may reflect hemorrhage within the   right adrenal gland.     Moderate bilateral pleural effusions with underlying compressive   atelectasis..    Rest of the findings as above.    Advanced directive addressed: full resuscitation

## 2017-06-01 NOTE — PROGRESS NOTE ADULT - ASSESSMENT
Perforated diverticulitis  s/p Hartmans procedure    hypokalemia    Adv diet  OOB continue with diuresis  replace K  transfer to med surg floor    Pt seen and examined with Dr Hagen.

## 2017-06-01 NOTE — PROGRESS NOTE ADULT - SUBJECTIVE AND OBJECTIVE BOX
47M admitted for a 24hr h/o progressive abdominal pain that began after a 1-2 day trip to [facility]. CT with acute perforated sigmoid diverticulitis, s/p LAP with resection (Hartmanns/ileocolic resection)POD #9 complicated by septic shock secondary to the above presently off pressor support. Rec'd large of INF for post fluid resuscitation now with anasarca  HARDIK/ATN resolved  Continues to have persistent elevated white count and low grade temp. CT c/w/ perihepatic ascites, no obvious abcess or fluid collection identified on CT. For IR percutaneous drainage today.     Adequate UOP. +4 pitting edema with testicular swelling. Denies SOB.     5/31: denies sob, good uop. s/p drainage of abd fluid collection   6/1: generalized swelling noteably improved. no complaints    ICU Vital Signs Last 24 Hrs  T(C): 36.8, Max: 37.6 (06-01 @ 05:54)  T(F): 98.3, Max: 99.7 (06-01 @ 05:54)  HR: 82 (66 - 85)  BP: 115/60 (97/66 - 123/54)  BP(mean): 69 (62 - 70)  ABP: --  ABP(mean): --  RR: 20 (16 - 27)  SpO2: 95% (94% - 100%)            PHYSICAL EXAM:    Constitutional: NAD, awake and alert, well-developed  HEENT: PERR, EOMI, Normal Hearing, MMM  Neck: Soft and supple, No LAD, No JVD  Respiratory: minimal rales to base  Cardiovascular: S1 and S2, regular rate and rhythm, no Murmurs, gallops or rubs  Gastrointestinal: Bowel Sounds present, soft, nontender, nondistended, colostomy bag: stump pink. LUIS M drain: serosanguinous fluid  Extremities: +2 pitting b/l LE with testicular swelling reducing  Vascular: 2+ peripheral pulses  Neurological: A/O x 3, no focal deficits

## 2017-06-02 LAB
ANION GAP SERPL CALC-SCNC: 6 MMOL/L — SIGNIFICANT CHANGE UP (ref 5–17)
BUN SERPL-MCNC: 8 MG/DL — SIGNIFICANT CHANGE UP (ref 7–23)
CALCIUM SERPL-MCNC: 7.7 MG/DL — LOW (ref 8.5–10.1)
CHLORIDE SERPL-SCNC: 100 MMOL/L — SIGNIFICANT CHANGE UP (ref 96–108)
CO2 SERPL-SCNC: 32 MMOL/L — HIGH (ref 22–31)
CREAT SERPL-MCNC: 0.88 MG/DL — SIGNIFICANT CHANGE UP (ref 0.5–1.3)
GLUCOSE SERPL-MCNC: 97 MG/DL — SIGNIFICANT CHANGE UP (ref 70–99)
HCT VFR BLD CALC: 29 % — LOW (ref 39–50)
HGB BLD-MCNC: 10 G/DL — LOW (ref 13–17)
MAGNESIUM SERPL-MCNC: 2.1 MG/DL — SIGNIFICANT CHANGE UP (ref 1.6–2.6)
MCHC RBC-ENTMCNC: 30.5 PG — SIGNIFICANT CHANGE UP (ref 27–34)
MCHC RBC-ENTMCNC: 34.6 GM/DL — SIGNIFICANT CHANGE UP (ref 32–36)
MCV RBC AUTO: 88.2 FL — SIGNIFICANT CHANGE UP (ref 80–100)
PHOSPHATE SERPL-MCNC: 2.4 MG/DL — LOW (ref 2.5–4.5)
PLATELET # BLD AUTO: 375 K/UL — SIGNIFICANT CHANGE UP (ref 150–400)
POTASSIUM SERPL-MCNC: 3.5 MMOL/L — SIGNIFICANT CHANGE UP (ref 3.5–5.3)
POTASSIUM SERPL-SCNC: 3.5 MMOL/L — SIGNIFICANT CHANGE UP (ref 3.5–5.3)
RBC # BLD: 3.29 M/UL — LOW (ref 4.2–5.8)
RBC # FLD: 13.1 % — SIGNIFICANT CHANGE UP (ref 10.3–14.5)
SODIUM SERPL-SCNC: 138 MMOL/L — SIGNIFICANT CHANGE UP (ref 135–145)
WBC # BLD: 15 K/UL — HIGH (ref 3.8–10.5)
WBC # FLD AUTO: 15 K/UL — HIGH (ref 3.8–10.5)

## 2017-06-02 RX ORDER — POTASSIUM CHLORIDE 20 MEQ
40 PACKET (EA) ORAL EVERY 4 HOURS
Qty: 0 | Refills: 0 | Status: COMPLETED | OUTPATIENT
Start: 2017-06-02 | End: 2017-06-02

## 2017-06-02 RX ORDER — FUROSEMIDE 40 MG
40 TABLET ORAL DAILY
Qty: 0 | Refills: 0 | Status: DISCONTINUED | OUTPATIENT
Start: 2017-06-03 | End: 2017-06-05

## 2017-06-02 RX ADMIN — PANTOPRAZOLE SODIUM 40 MILLIGRAM(S): 20 TABLET, DELAYED RELEASE ORAL at 12:21

## 2017-06-02 RX ADMIN — HEPARIN SODIUM 5000 UNIT(S): 5000 INJECTION INTRAVENOUS; SUBCUTANEOUS at 05:59

## 2017-06-02 RX ADMIN — HEPARIN SODIUM 5000 UNIT(S): 5000 INJECTION INTRAVENOUS; SUBCUTANEOUS at 14:28

## 2017-06-02 RX ADMIN — Medication 3 MILLILITER(S): at 02:21

## 2017-06-02 RX ADMIN — Medication 40 MILLIEQUIVALENT(S): at 18:45

## 2017-06-02 RX ADMIN — Medication 40 MILLIEQUIVALENT(S): at 12:19

## 2017-06-02 RX ADMIN — Medication 40 MILLIGRAM(S): at 05:59

## 2017-06-02 RX ADMIN — HEPARIN SODIUM 5000 UNIT(S): 5000 INJECTION INTRAVENOUS; SUBCUTANEOUS at 21:41

## 2017-06-02 NOTE — DISCHARGE NOTE ADULT - CARE PLAN
Principal Discharge DX:	Diverticulitis of intestine with perforation without bleeding, unspecified part of intestinal tract  Goal:	Fully recover  Instructions for follow-up, activity and diet:	No heavy lifting > 10 lbs; diet as tolerated  Secondary Diagnosis:	Small bowel obstruction  Secondary Diagnosis:	Septic shock Principal Discharge DX:	Diverticulitis of intestine with perforation without bleeding, unspecified part of intestinal tract  Goal:	Fully recover  Instructions for follow-up, activity and diet:	No heavy lifting > 10 lbs; TPN, no food.  Secondary Diagnosis:	Small bowel obstruction  Secondary Diagnosis:	Septic shock

## 2017-06-02 NOTE — DISCHARGE NOTE ADULT - CARE PROVIDER_API CALL
Annel Boss), ColonRectal Surgery; Surgery  [address] [facility] [address] [place] [address]  Phone: [phone]  Fax: [phone]

## 2017-06-02 NOTE — DISCHARGE NOTE ADULT - HOME CARE AGENCY
[facility] : RN/PT  evaluation for teaching/assessment, evaluate for HHA [facility] , [facility]

## 2017-06-02 NOTE — PROGRESS NOTE ADULT - ASSESSMENT
47M admitted for a 24hr h/o progressive abdominal pain that began after a 1-2 day trip to [facility]. CT with acute perforated sigmoid diverticulitis, s/p LAP with resection (Hartmanns/ileocolic resection)POD #9 complicated by septic shock secondary to the above presently off pressor support. Rec'd large of INF for post fluid resuscitation now with anasarca

## 2017-06-02 NOTE — DISCHARGE NOTE ADULT - NS AS ACTIVITY OBS
No Heavy lifting/straining Walking-Outdoors allowed/Walking-Indoors allowed/No Heavy lifting/straining/Showering allowed

## 2017-06-02 NOTE — DISCHARGE NOTE ADULT - PATIENT PORTAL LINK FT
“You can access the [facility] Patient Portal, offered by [facility], by registering with the following website: [email or web address]”

## 2017-06-02 NOTE — PROGRESS NOTE ADULT - SUBJECTIVE AND OBJECTIVE BOX
47M admitted for a 24hr h/o progressive abdominal pain that began after a 1-2 day trip to [facility]. CT with acute perforated sigmoid diverticulitis, s/p LAP with resection (Hartmanns/ileocolic resection)POD #9 complicated by septic shock secondary to the above presently off pressor support. Rec'd large of INF for post fluid resuscitation now with anasarca  HARDIK/ATN resolved  Continues to have persistent elevated white count and low grade temp. CT c/w/ perihepatic ascites, no obvious abcess or fluid collection identified on CT. For IR percutaneous drainage today.     Adequate UOP. +4 pitting edema with testicular swelling. Denies SOB.     5/31: denies sob, good uop. s/p drainage of abd fluid collection   6/1: generalized swelling noteably improved. no complaints  6/2: adequate UOP. swelling diminshing    ICU Vital Signs Last 24 Hrs  T(C): 37, Max: 37.8 (06-01 @ 14:39)  T(F): 98.6, Max: 100.1 (06-01 @ 14:39)  HR: 78 (71 - 86)  BP: 119/54 (111/54 - 126/64)  BP(mean): 70 (65 - 99)  ABP: --  ABP(mean): --  RR: 21 (14 - 21)  SpO2: 94% (94% - 100%)            PHYSICAL EXAM:    Constitutional: NAD, awake and alert, well-developed  HEENT: PERR, EOMI, Normal Hearing, MMM  Neck: Soft and supple, No LAD, No JVD  Respiratory: minimal rales to base  Cardiovascular: S1 and S2, regular rate and rhythm, no Murmurs, gallops or rubs  Gastrointestinal: Bowel Sounds present, soft, nontender, nondistended, colostomy bag: stump pink. LUIS M drain: serosanguinous fluid  Extremities: +2 pitting b/l LE with testicular swelling reducing  Vascular: 2+ peripheral pulses  Neurological: A/O x 3, no focal deficits

## 2017-06-02 NOTE — PROGRESS NOTE ADULT - SUBJECTIVE AND OBJECTIVE BOX
Subjective:    pat better,no new complaint.    MEDICATIONS  (STANDING):  pantoprazole  Injectable 40milliGRAM(s) IV Push daily  heparin  Injectable 5000Unit(s) SubCutaneous every 8 hours  ALBUTerol/ipratropium for Nebulization 3milliLiter(s) Nebulizer every 6 hours  furosemide   Injectable 40milliGRAM(s) IV Push every 12 hours  potassium chloride    Tablet ER 40milliEquivalent(s) Oral every 4 hours    MEDICATIONS  (PRN):  naloxone Injectable 0.1milliGRAM(s) IV Push every 3 minutes PRN For ANY of the following changes in patient status:  A. RR LESS THAN 10 breaths per minute, B. Oxygen saturation LESS THAN 90%, C. Sedation score of 6  ondansetron Injectable 4milliGRAM(s) IV Push every 6 hours PRN Nausea  ondansetron Injectable 4milliGRAM(s) IV Push every 6 hours PRN Nausea  acetaminophen  Suppository 650milliGRAM(s) Rectal every 6 hours PRN For Temp greater than 38.5 C (101.3 F)  zolpidem 5milliGRAM(s) Oral at bedtime PRN Insomnia  melatonin Oral Tab/Cap - Peds 3milliGRAM(s) Oral at bedtime PRN Insomnia      Allergies    No Known Allergies    Intolerances        Vital Signs Last 24 Hrs  T(C): 37, Max: 37.8 (06-01 @ 14:39)  T(F): 98.6, Max: 100.1 (06-01 @ 14:39)  HR: 78 (71 - 86)  BP: 119/54 (111/54 - 126/64)  BP(mean): 70 (65 - 99)  RR: 21 (14 - 21)  SpO2: 94% (94% - 100%)    PHYSICAL EXAMINATION:    NECK:  Supple. No lymphadenopathy. Jugular venous pressure not elevated. Carotids equal.   HEART:   The cardiac impulse has a normal quality. Reg., Nl S1 and S2.  There are no murmurs, rubs or gallops noted  CHEST:  Chest is clear to auscultation. Normal respiratory effort.  ABDOMEN:  Soft and nontender.   EXTREMITIES:  There is no edema.       LABS:                        10.0   15.0  )-----------( 375      ( 02 Jun 2017 05:53 )             29.0     06-02    138  |  100  |  8   ----------------------------<  97  3.5   |  32<H>  |  0.88    Ca    7.7<L>      02 Jun 2017 05:53  Phos  2.4     06-02  Mg     2.1     06-02

## 2017-06-02 NOTE — DISCHARGE NOTE ADULT - HOSPITAL COURSE
Mr. Cooper was admitted on 5/19/17 with contained perforated sigmoid diverticulitis. Attempts at conservative management with IV abx failed as patient's WBC progressively worsened and there was evidence of massive third spacing as noted by significant hemoconcentration. He was taken back to the OR on 5/21/17 for an exploratory laparotomy, Hartmanns procedure with ileocolectomy for a SB phlegmon that was causing SBO. Postoperatively, he went into septic shock and required ICU admission with levo gtt and vasopressin gtt for cardiovascular support. At this time, he also went into ARF from acute kidney injury precipitated by shock. Over the course of several days, his cardiopulmonary status stabilized with gradual weaning of the vasopressors. He never required intubation. Once he was HD stable, his diet was gradually advanced. He has had a fluctuating WBC and as such, a CT A/P on 5/29 was obtained which demonstrate perihepatic fluid and air. This was percutaneously drained, but there was no e/o that this fluid was infected. Over the subsequent days, he underwent inpatient rehab, had his antibiotics discontinued and underwent diuresis. Mr. Cooper was admitted on 5/19/17 with contained perforated sigmoid diverticulitis. Attempts at conservative management with IV abx failed as patient's WBC progressively worsened and there was evidence of massive third spacing as noted by significant hemoconcentration. He was taken back to the OR on 5/21/17 for an exploratory laparotomy, Hartmanns procedure with ileocolectomy for a SB phlegmon that was causing SBO. Postoperatively, he went into septic shock and required ICU admission with levo gtt and vasopressin gtt for cardiovascular support. At this time, he also went into ARF from acute kidney injury precipitated by shock. Over the course of several days, his cardiopulmonary status stabilized with gradual weaning of the vasopressors. He never required intubation. Once he was HD stable, his diet was gradually advanced. He has had a fluctuating WBC and as such, a CT A/P on 5/29 was obtained which demonstrate perihepatic fluid and air. This was percutaneously drained, but there was no e/o that this fluid was infected. Over the subsequent days, he underwent inpatient rehab, had his antibiotics discontinued and underwent diuresis. He subsequently had another CT of the abdomen in preparation of removing drains, and a ileocolic leak was noted, Abx restarted, made npo and TPN was initiated. A drain was placed by IR in this area. previous drain was removed along with LUIS M.  in addition octreotide was initiated. He remained stable, he was growing VRE from this culture and ID recommended home invanz and completion of fluconazole. A repeat CT continued to show a leak but collection was smaller and still with air. Plan was for him to go home with home care for ostomy, IR drain, IV abx via picc and home tpn.

## 2017-06-03 LAB
ANION GAP SERPL CALC-SCNC: 7 MMOL/L — SIGNIFICANT CHANGE UP (ref 5–17)
BUN SERPL-MCNC: 9 MG/DL — SIGNIFICANT CHANGE UP (ref 7–23)
CALCIUM SERPL-MCNC: 8 MG/DL — LOW (ref 8.5–10.1)
CHLORIDE SERPL-SCNC: 100 MMOL/L — SIGNIFICANT CHANGE UP (ref 96–108)
CO2 SERPL-SCNC: 30 MMOL/L — SIGNIFICANT CHANGE UP (ref 22–31)
CREAT SERPL-MCNC: 0.9 MG/DL — SIGNIFICANT CHANGE UP (ref 0.5–1.3)
GLUCOSE SERPL-MCNC: 105 MG/DL — HIGH (ref 70–99)
HCT VFR BLD CALC: 31 % — LOW (ref 39–50)
HGB BLD-MCNC: 10.7 G/DL — LOW (ref 13–17)
MAGNESIUM SERPL-MCNC: 2.1 MG/DL — SIGNIFICANT CHANGE UP (ref 1.6–2.6)
MCHC RBC-ENTMCNC: 31 PG — SIGNIFICANT CHANGE UP (ref 27–34)
MCHC RBC-ENTMCNC: 34.5 GM/DL — SIGNIFICANT CHANGE UP (ref 32–36)
MCV RBC AUTO: 89.8 FL — SIGNIFICANT CHANGE UP (ref 80–100)
PHOSPHATE SERPL-MCNC: 2.3 MG/DL — LOW (ref 2.5–4.5)
PLATELET # BLD AUTO: 436 K/UL — HIGH (ref 150–400)
POTASSIUM SERPL-MCNC: 3.5 MMOL/L — SIGNIFICANT CHANGE UP (ref 3.5–5.3)
POTASSIUM SERPL-SCNC: 3.5 MMOL/L — SIGNIFICANT CHANGE UP (ref 3.5–5.3)
RBC # BLD: 3.45 M/UL — LOW (ref 4.2–5.8)
RBC # FLD: 13.2 % — SIGNIFICANT CHANGE UP (ref 10.3–14.5)
SODIUM SERPL-SCNC: 137 MMOL/L — SIGNIFICANT CHANGE UP (ref 135–145)
WBC # BLD: 16.3 K/UL — HIGH (ref 3.8–10.5)
WBC # FLD AUTO: 16.3 K/UL — HIGH (ref 3.8–10.5)

## 2017-06-03 RX ORDER — SODIUM,POTASSIUM PHOSPHATES 278-250MG
1 POWDER IN PACKET (EA) ORAL ONCE
Qty: 0 | Refills: 0 | Status: COMPLETED | OUTPATIENT
Start: 2017-06-03 | End: 2017-06-03

## 2017-06-03 RX ORDER — PANTOPRAZOLE SODIUM 20 MG/1
40 TABLET, DELAYED RELEASE ORAL
Qty: 0 | Refills: 0 | Status: DISCONTINUED | OUTPATIENT
Start: 2017-06-03 | End: 2017-06-07

## 2017-06-03 RX ADMIN — HEPARIN SODIUM 5000 UNIT(S): 5000 INJECTION INTRAVENOUS; SUBCUTANEOUS at 14:28

## 2017-06-03 RX ADMIN — Medication 3 MILLILITER(S): at 20:28

## 2017-06-03 RX ADMIN — Medication 1 TABLET(S): at 18:34

## 2017-06-03 RX ADMIN — HEPARIN SODIUM 5000 UNIT(S): 5000 INJECTION INTRAVENOUS; SUBCUTANEOUS at 05:16

## 2017-06-03 RX ADMIN — Medication 3 MILLILITER(S): at 11:12

## 2017-06-03 RX ADMIN — PANTOPRAZOLE SODIUM 40 MILLIGRAM(S): 20 TABLET, DELAYED RELEASE ORAL at 12:06

## 2017-06-03 RX ADMIN — HEPARIN SODIUM 5000 UNIT(S): 5000 INJECTION INTRAVENOUS; SUBCUTANEOUS at 21:10

## 2017-06-03 RX ADMIN — Medication 40 MILLIGRAM(S): at 05:16

## 2017-06-03 NOTE — PROGRESS NOTE ADULT - ASSESSMENT
47M admitted for a 24hr h/o progressive abdominal pain that began after a 1-2 day trip to [facility]. CT with acute perforated sigmoid diverticulitis, s/p LAP with resection (Hartmanns/ileocolic resection)POD #9 complicated by septic shock secondary to the above presently off pressor support. Rec'd large of INF for post fluid resuscitation now with anasarca

## 2017-06-03 NOTE — PROGRESS NOTE ADULT - SUBJECTIVE AND OBJECTIVE BOX
Subjective:    pat better, no new complaint.    MEDICATIONS  (STANDING):  heparin  Injectable 5000Unit(s) SubCutaneous every 8 hours  ALBUTerol/ipratropium for Nebulization 3milliLiter(s) Nebulizer every 6 hours  furosemide   Injectable 40milliGRAM(s) IV Push daily  pantoprazole    Tablet 40milliGRAM(s) Oral before breakfast    MEDICATIONS  (PRN):  naloxone Injectable 0.1milliGRAM(s) IV Push every 3 minutes PRN For ANY of the following changes in patient status:  A. RR LESS THAN 10 breaths per minute, B. Oxygen saturation LESS THAN 90%, C. Sedation score of 6  ondansetron Injectable 4milliGRAM(s) IV Push every 6 hours PRN Nausea  ondansetron Injectable 4milliGRAM(s) IV Push every 6 hours PRN Nausea  acetaminophen  Suppository 650milliGRAM(s) Rectal every 6 hours PRN For Temp greater than 38.5 C (101.3 F)  zolpidem 5milliGRAM(s) Oral at bedtime PRN Insomnia  melatonin Oral Tab/Cap - Peds 3milliGRAM(s) Oral at bedtime PRN Insomnia      Allergies    No Known Allergies    Intolerances        Vital Signs Last 24 Hrs  T(C): 37.4, Max: 38.2 (06-02 @ 14:42)  T(F): 99.4, Max: 100.7 (06-02 @ 14:42)  HR: 81 (72 - 94)  BP: 116/64 (107/35 - 127/59)  BP(mean): 77 (52 - 80)  RR: 22 (17 - 22)  SpO2: 83% (83% - 97%)    PHYSICAL EXAMINATION:    NECK:  Supple. No lymphadenopathy. Jugular venous pressure not elevated. Carotids equal.   HEART:   The cardiac impulse has a normal quality. Reg., Nl S1 and S2.  There are no murmurs, rubs or gallops noted  CHEST:  Chest is clear to auscultation. Normal respiratory effort.  ABDOMEN:  Soft and nontender.   EXTREMITIES:  There is no edema.       LABS:                        10.7   16.3  )-----------( 436      ( 03 Jun 2017 05:48 )             31.0     06-03    137  |  100  |  9   ----------------------------<  105<H>  3.5   |  30  |  0.90    Ca    8.0<L>      03 Jun 2017 05:48  Phos  2.3     06-03  Mg     2.1     06-03

## 2017-06-03 NOTE — PROGRESS NOTE ADULT - ASSESSMENT
PROBLEMS:    Diverticulitis of intestine with perforation S/P LAP  Perihepatic collection- s/p drain  Moderate pleural effusion with atelectasis  HYPOXAMIA  ATELECTASIS  COPD    PLAN;    pulmonary stable  S/P IR drainage of intra-abdomen collection  pleural effusion sympthatic - fu conservatively  d/w colorectal surgery  AEROSOLS  SUPPORTIVE CARE  DVT PROPHYLASIX

## 2017-06-03 NOTE — PROGRESS NOTE ADULT - SUBJECTIVE AND OBJECTIVE BOX
47M admitted for a 24hr h/o progressive abdominal pain that began after a 1-2 day trip to [facility]. CT with acute perforated sigmoid diverticulitis, s/p LAP with resection (Hartmanns/ileocolic resection)POD #9 complicated by septic shock secondary to the above presently off pressor support. Rec'd large of INF for post fluid resuscitation now with anasarca  HARDIK/ATN resolved  Continues to have persistent elevated white count and low grade temp. CT c/w/ perihepatic ascites, no obvious abcess or fluid collection identified on CT. For IR percutaneous drainage today.     Adequate UOP. +4 pitting edema with testicular swelling. Denies SOB.     5/31: denies sob, good uop. s/p drainage of abd fluid collection   6/1: generalized swelling noteably improved. no complaints  6/2: adequate UOP. swelling diminshing  6/3: overall state improving. low grade temp. non toxic appearing. fluid culture neag to date    ICU Vital Signs Last 24 Hrs  T(C): 37, Max: 37.8 (06-01 @ 14:39)  T(F): 98.6, Max: 100.1 (06-01 @ 14:39)  HR: 78 (71 - 86)  BP: 119/54 (111/54 - 126/64)  BP(mean): 70 (65 - 99)  ABP: --  ABP(mean): --  RR: 21 (14 - 21)  SpO2: 94% (94% - 100%)            PHYSICAL EXAM:    Constitutional: NAD, awake and alert, well-developed  HEENT: PERR, EOMI, Normal Hearing, MMM  Neck: Soft and supple, No LAD, No JVD  Respiratory: minimal rales to base  Cardiovascular: S1 and S2, regular rate and rhythm, no Murmurs, gallops or rubs  Gastrointestinal: Bowel Sounds present, soft, nontender, nondistended, colostomy bag: stump pink. LUIS M drain: serosanguinous fluid  Extremities: +2 pitting b/l LE with testicular swelling reducing  Vascular: 2+ peripheral pulses  Neurological: A/O x 3, no focal deficits

## 2017-06-04 LAB
ANION GAP SERPL CALC-SCNC: 9 MMOL/L — SIGNIFICANT CHANGE UP (ref 5–17)
BUN SERPL-MCNC: 10 MG/DL — SIGNIFICANT CHANGE UP (ref 7–23)
CALCIUM SERPL-MCNC: 8.5 MG/DL — SIGNIFICANT CHANGE UP (ref 8.5–10.1)
CHLORIDE SERPL-SCNC: 99 MMOL/L — SIGNIFICANT CHANGE UP (ref 96–108)
CO2 SERPL-SCNC: 30 MMOL/L — SIGNIFICANT CHANGE UP (ref 22–31)
CREAT SERPL-MCNC: 1.01 MG/DL — SIGNIFICANT CHANGE UP (ref 0.5–1.3)
CULTURE RESULTS: SIGNIFICANT CHANGE UP
GLUCOSE SERPL-MCNC: 93 MG/DL — SIGNIFICANT CHANGE UP (ref 70–99)
HCT VFR BLD CALC: 36.6 % — LOW (ref 39–50)
HGB BLD-MCNC: 12.3 G/DL — LOW (ref 13–17)
MAGNESIUM SERPL-MCNC: 2.3 MG/DL — SIGNIFICANT CHANGE UP (ref 1.6–2.6)
MCHC RBC-ENTMCNC: 30 PG — SIGNIFICANT CHANGE UP (ref 27–34)
MCHC RBC-ENTMCNC: 33.6 GM/DL — SIGNIFICANT CHANGE UP (ref 32–36)
MCV RBC AUTO: 89.1 FL — SIGNIFICANT CHANGE UP (ref 80–100)
PHOSPHATE SERPL-MCNC: 3 MG/DL — SIGNIFICANT CHANGE UP (ref 2.5–4.5)
PLATELET # BLD AUTO: 575 K/UL — HIGH (ref 150–400)
POTASSIUM SERPL-MCNC: 3.9 MMOL/L — SIGNIFICANT CHANGE UP (ref 3.5–5.3)
POTASSIUM SERPL-SCNC: 3.9 MMOL/L — SIGNIFICANT CHANGE UP (ref 3.5–5.3)
RBC # BLD: 4.1 M/UL — LOW (ref 4.2–5.8)
RBC # FLD: 12.7 % — SIGNIFICANT CHANGE UP (ref 10.3–14.5)
SODIUM SERPL-SCNC: 138 MMOL/L — SIGNIFICANT CHANGE UP (ref 135–145)
SPECIMEN SOURCE: SIGNIFICANT CHANGE UP
WBC # BLD: 15.3 K/UL — HIGH (ref 3.8–10.5)
WBC # FLD AUTO: 15.3 K/UL — HIGH (ref 3.8–10.5)

## 2017-06-04 RX ORDER — MORPHINE SULFATE 50 MG/1
2 CAPSULE, EXTENDED RELEASE ORAL EVERY 4 HOURS
Qty: 0 | Refills: 0 | Status: DISCONTINUED | OUTPATIENT
Start: 2017-06-04 | End: 2017-06-04

## 2017-06-04 RX ADMIN — MORPHINE SULFATE 2 MILLIGRAM(S): 50 CAPSULE, EXTENDED RELEASE ORAL at 05:52

## 2017-06-04 RX ADMIN — MORPHINE SULFATE 2 MILLIGRAM(S): 50 CAPSULE, EXTENDED RELEASE ORAL at 05:22

## 2017-06-04 RX ADMIN — PANTOPRAZOLE SODIUM 40 MILLIGRAM(S): 20 TABLET, DELAYED RELEASE ORAL at 05:21

## 2017-06-04 RX ADMIN — HEPARIN SODIUM 5000 UNIT(S): 5000 INJECTION INTRAVENOUS; SUBCUTANEOUS at 15:07

## 2017-06-04 RX ADMIN — Medication 40 MILLIGRAM(S): at 05:21

## 2017-06-04 RX ADMIN — HEPARIN SODIUM 5000 UNIT(S): 5000 INJECTION INTRAVENOUS; SUBCUTANEOUS at 05:22

## 2017-06-04 RX ADMIN — Medication 3 MILLILITER(S): at 07:57

## 2017-06-04 RX ADMIN — HEPARIN SODIUM 5000 UNIT(S): 5000 INJECTION INTRAVENOUS; SUBCUTANEOUS at 21:10

## 2017-06-04 NOTE — PROGRESS NOTE ADULT - SUBJECTIVE AND OBJECTIVE BOX
patient seen and examined. no compliants, denies any abdominal pain, fevers, chills.  PE   aaox3 NAD  abd: soft NT ND + colostomy output  IR drain: scant  LUIS M; 30 cc serous                        10.7   16.3  )-----------( 436      ( 03 Jun 2017 05:48 )             31.0   06-03    137  |  100  |  9   ----------------------------<  105<H>  3.5   |  30  |  0.90    Ca    8.0<L>      03 Jun 2017 05:48  Phos  2.3     06-03  Mg     2.1     06-03

## 2017-06-04 NOTE — PROGRESS NOTE ADULT - SUBJECTIVE AND OBJECTIVE BOX
Subjective:    pat lying in bed, no distress.    MEDICATIONS  (STANDING):  heparin  Injectable 5000Unit(s) SubCutaneous every 8 hours  ALBUTerol/ipratropium for Nebulization 3milliLiter(s) Nebulizer every 6 hours  furosemide   Injectable 40milliGRAM(s) IV Push daily  pantoprazole    Tablet 40milliGRAM(s) Oral before breakfast    MEDICATIONS  (PRN):  naloxone Injectable 0.1milliGRAM(s) IV Push every 3 minutes PRN For ANY of the following changes in patient status:  A. RR LESS THAN 10 breaths per minute, B. Oxygen saturation LESS THAN 90%, C. Sedation score of 6  ondansetron Injectable 4milliGRAM(s) IV Push every 6 hours PRN Nausea  ondansetron Injectable 4milliGRAM(s) IV Push every 6 hours PRN Nausea  zolpidem 5milliGRAM(s) Oral at bedtime PRN Insomnia  melatonin Oral Tab/Cap - Peds 3milliGRAM(s) Oral at bedtime PRN Insomnia  morphine  - Injectable 2milliGRAM(s) IV Push every 4 hours PRN Moderate Pain (4 - 6)  oxyCODONE  5 mG/acetaminophen 325 mG 1Tablet(s) Oral every 4 hours PRN Mild Pain (1 - 3)  oxyCODONE  5 mG/acetaminophen 325 mG 2Tablet(s) Oral every 4 hours PRN Moderate Pain (4 - 6)      Allergies    No Known Allergies    Intolerances        Vital Signs Last 24 Hrs  T(C): 36.9, Max: 37.9 (06-03 @ 13:41)  T(F): 98.5, Max: 100.2 (06-03 @ 13:41)  HR: 71 (69 - 89)  BP: 104/71 (98/41 - 126/72)  BP(mean): 76 (53 - 89)  RR: 16 (13 - 24)  SpO2: 97% (95% - 98%)    PHYSICAL EXAMINATION:    NECK:  Supple. No lymphadenopathy. Jugular venous pressure not elevated. Carotids equal.   HEART:   The cardiac impulse has a normal quality. Reg., Nl S1 and S2.  There are no murmurs, rubs or gallops noted  CHEST:  Chest is clear to auscultation. Normal respiratory effort.  ABDOMEN:  Soft and nontender.   EXTREMITIES:  There is no edema.       LABS:                        12.3   15.3  )-----------( 575      ( 04 Jun 2017 06:49 )             36.6     06-04    138  |  99  |  10  ----------------------------<  93  3.9   |  30  |  1.01    Ca    8.5      04 Jun 2017 06:49  Phos  3.0     06-04  Mg     2.3     06-04

## 2017-06-04 NOTE — PROGRESS NOTE ADULT - ASSESSMENT
doing much better. will get CT scan tomorrow and possible dc of drain and LUIS M.  dc planning for later in the week

## 2017-06-05 LAB
ANION GAP SERPL CALC-SCNC: 4 MMOL/L — LOW (ref 5–17)
BUN SERPL-MCNC: 7 MG/DL — SIGNIFICANT CHANGE UP (ref 7–23)
CALCIUM SERPL-MCNC: 7.7 MG/DL — LOW (ref 8.5–10.1)
CHLORIDE SERPL-SCNC: 104 MMOL/L — SIGNIFICANT CHANGE UP (ref 96–108)
CO2 SERPL-SCNC: 28 MMOL/L — SIGNIFICANT CHANGE UP (ref 22–31)
CREAT SERPL-MCNC: 0.78 MG/DL — SIGNIFICANT CHANGE UP (ref 0.5–1.3)
GLUCOSE SERPL-MCNC: 92 MG/DL — SIGNIFICANT CHANGE UP (ref 70–99)
HCT VFR BLD CALC: 29.6 % — LOW (ref 39–50)
HGB BLD-MCNC: 10.4 G/DL — LOW (ref 13–17)
MAGNESIUM SERPL-MCNC: 2.3 MG/DL — SIGNIFICANT CHANGE UP (ref 1.6–2.6)
MCHC RBC-ENTMCNC: 30.3 PG — SIGNIFICANT CHANGE UP (ref 27–34)
MCHC RBC-ENTMCNC: 34.9 GM/DL — SIGNIFICANT CHANGE UP (ref 32–36)
MCV RBC AUTO: 86.8 FL — SIGNIFICANT CHANGE UP (ref 80–100)
PHOSPHATE SERPL-MCNC: 2.6 MG/DL — SIGNIFICANT CHANGE UP (ref 2.5–4.5)
PLATELET # BLD AUTO: 442 K/UL — HIGH (ref 150–400)
POTASSIUM SERPL-MCNC: 3.9 MMOL/L — SIGNIFICANT CHANGE UP (ref 3.5–5.3)
POTASSIUM SERPL-SCNC: 3.9 MMOL/L — SIGNIFICANT CHANGE UP (ref 3.5–5.3)
RBC # BLD: 3.42 M/UL — LOW (ref 4.2–5.8)
RBC # FLD: 12.8 % — SIGNIFICANT CHANGE UP (ref 10.3–14.5)
SODIUM SERPL-SCNC: 136 MMOL/L — SIGNIFICANT CHANGE UP (ref 135–145)
WBC # BLD: 14.2 K/UL — HIGH (ref 3.8–10.5)
WBC # FLD AUTO: 14.2 K/UL — HIGH (ref 3.8–10.5)

## 2017-06-05 PROCEDURE — 74177 CT ABD & PELVIS W/CONTRAST: CPT | Mod: 26

## 2017-06-05 RX ORDER — OCTREOTIDE ACETATE 200 UG/ML
100 INJECTION, SOLUTION INTRAVENOUS; SUBCUTANEOUS EVERY 8 HOURS
Qty: 0 | Refills: 0 | Status: DISCONTINUED | OUTPATIENT
Start: 2017-06-05 | End: 2017-06-13

## 2017-06-05 RX ORDER — PIPERACILLIN AND TAZOBACTAM 4; .5 G/20ML; G/20ML
3.38 INJECTION, POWDER, LYOPHILIZED, FOR SOLUTION INTRAVENOUS EVERY 8 HOURS
Qty: 0 | Refills: 0 | Status: DISCONTINUED | OUTPATIENT
Start: 2017-06-05 | End: 2017-06-06

## 2017-06-05 RX ORDER — SODIUM CHLORIDE 9 MG/ML
1000 INJECTION INTRAMUSCULAR; INTRAVENOUS; SUBCUTANEOUS
Qty: 0 | Refills: 0 | Status: DISCONTINUED | OUTPATIENT
Start: 2017-06-05 | End: 2017-06-07

## 2017-06-05 RX ADMIN — Medication 40 MILLIGRAM(S): at 06:05

## 2017-06-05 RX ADMIN — Medication 3 MILLILITER(S): at 08:09

## 2017-06-05 RX ADMIN — HEPARIN SODIUM 5000 UNIT(S): 5000 INJECTION INTRAVENOUS; SUBCUTANEOUS at 06:05

## 2017-06-05 RX ADMIN — HEPARIN SODIUM 5000 UNIT(S): 5000 INJECTION INTRAVENOUS; SUBCUTANEOUS at 21:37

## 2017-06-05 RX ADMIN — PIPERACILLIN AND TAZOBACTAM 25 GRAM(S): 4; .5 INJECTION, POWDER, LYOPHILIZED, FOR SOLUTION INTRAVENOUS at 21:37

## 2017-06-05 RX ADMIN — PANTOPRAZOLE SODIUM 40 MILLIGRAM(S): 20 TABLET, DELAYED RELEASE ORAL at 06:07

## 2017-06-05 RX ADMIN — HEPARIN SODIUM 5000 UNIT(S): 5000 INJECTION INTRAVENOUS; SUBCUTANEOUS at 13:28

## 2017-06-05 RX ADMIN — OCTREOTIDE ACETATE 100 MICROGRAM(S): 200 INJECTION, SOLUTION INTRAVENOUS; SUBCUTANEOUS at 21:37

## 2017-06-05 RX ADMIN — SODIUM CHLORIDE 75 MILLILITER(S): 9 INJECTION INTRAMUSCULAR; INTRAVENOUS; SUBCUTANEOUS at 19:30

## 2017-06-05 NOTE — PROGRESS NOTE ADULT - SUBJECTIVE AND OBJECTIVE BOX
Subjective:    pat no new complaint.    MEDICATIONS  (STANDING):  heparin  Injectable 5000Unit(s) SubCutaneous every 8 hours  ALBUTerol/ipratropium for Nebulization 3milliLiter(s) Nebulizer every 6 hours  furosemide   Injectable 40milliGRAM(s) IV Push daily  pantoprazole    Tablet 40milliGRAM(s) Oral before breakfast    MEDICATIONS  (PRN):  naloxone Injectable 0.1milliGRAM(s) IV Push every 3 minutes PRN For ANY of the following changes in patient status:  A. RR LESS THAN 10 breaths per minute, B. Oxygen saturation LESS THAN 90%, C. Sedation score of 6  ondansetron Injectable 4milliGRAM(s) IV Push every 6 hours PRN Nausea  ondansetron Injectable 4milliGRAM(s) IV Push every 6 hours PRN Nausea  zolpidem 5milliGRAM(s) Oral at bedtime PRN Insomnia  melatonin Oral Tab/Cap - Peds 3milliGRAM(s) Oral at bedtime PRN Insomnia  morphine  - Injectable 2milliGRAM(s) IV Push every 4 hours PRN Moderate Pain (4 - 6)  oxyCODONE  5 mG/acetaminophen 325 mG 1Tablet(s) Oral every 4 hours PRN Mild Pain (1 - 3)  oxyCODONE  5 mG/acetaminophen 325 mG 2Tablet(s) Oral every 4 hours PRN Moderate Pain (4 - 6)      Allergies    No Known Allergies    Intolerances        Vital Signs Last 24 Hrs  T(C): 36.9, Max: 38.3 (06-04 @ 14:34)  T(F): 98.5, Max: 100.9 (06-04 @ 14:34)  HR: 84 (72 - 96)  BP: 122/65 (103/37 - 123/70)  BP(mean): 78 (52 - 80)  RR: 18 (15 - 23)  SpO2: 99% (96% - 100%)    PHYSICAL EXAMINATION:    NECK:  Supple. No lymphadenopathy. Jugular venous pressure not elevated. Carotids equal.   HEART:   The cardiac impulse has a normal quality. Reg., Nl S1 and S2.  There are no murmurs, rubs or gallops noted  CHEST:  Chest is clear to auscultation. Normal respiratory effort.  ABDOMEN:  Soft and nontender.   EXTREMITIES:  There is no edema.       LABS:                        10.4   14.2  )-----------( 442      ( 05 Jun 2017 05:48 )             29.6     06-05    136  |  104  |  7   ----------------------------<  92  3.9   |  28  |  0.78    Ca    7.7<L>      05 Jun 2017 05:48  Phos  2.6     06-05  Mg     2.3     06-05

## 2017-06-05 NOTE — PROVIDER CONTACT NOTE (OTHER) - DATE AND TIME:
05-Jun-2017 16:07
22-May-2017 09:15
22-May-2017 09:30
22-May-2017 09:30
22-May-2017 09:38
22-May-2017 10:25
29-May-2017 11:05
29-May-2017 17:30
30-May-2017 16:00

## 2017-06-05 NOTE — PROGRESS NOTE ADULT - PROBLEM SELECTOR PROBLEM 1
Diverticulitis of intestine with perforation without bleeding, unspecified part of intestinal tract

## 2017-06-05 NOTE — PROGRESS NOTE ADULT - SUBJECTIVE AND OBJECTIVE BOX
47M admitted for a 24hr h/o progressive abdominal pain that began after a 1-2 day trip to [facility]. CT with acute perforated sigmoid diverticulitis, s/p LAP with resection (Hartmanns/ileocolic resection)POD #9 complicated by septic shock secondary to the above presently off pressor support. Rec'd large of INF for post fluid resuscitation now with anasarca  HARDIK/ATN resolved  Continues to have persistent elevated white count and low grade temp. CT c/w/ perihepatic ascites, no obvious abcess or fluid collection identified on CT. For IR percutaneous drainage today.     Adequate UOP. +4 pitting edema with testicular swelling. Denies SOB.     5/31: denies sob, good uop. s/p drainage of abd fluid collection   6/1: generalized swelling noteably improved. no complaints  6/2: adequate UOP. swelling diminshing  6/3: overall state improving. low grade temp. non toxic appearing. fluid culture neag to date  6/4: good uop. swelling has improved significantly . for ct scan today and poss removal of kimmy drain    ICU Vital Signs Last 24 Hrs  T(C): 37, Max: 37.8 (06-01 @ 14:39)  T(F): 98.6, Max: 100.1 (06-01 @ 14:39)  HR: 78 (71 - 86)  BP: 119/54 (111/54 - 126/64)  BP(mean): 70 (65 - 99)  ABP: --  ABP(mean): --  RR: 21 (14 - 21)  SpO2: 94% (94% - 100%)            PHYSICAL EXAM:    Constitutional: NAD, awake and alert, well-developed  HEENT: PERR, EOMI, Normal Hearing, MMM  Neck: Soft and supple, No LAD, No JVD  Respiratory: minimal rales to base  Cardiovascular: S1 and S2, regular rate and rhythm, no Murmurs, gallops or rubs  Gastrointestinal: Bowel Sounds present, soft, nontender, nondistended, colostomy bag: stump pink. KIMMY drain: serosanguinous fluid  Extremities: trace pitting pedal  edema, minimal scrotal edema  Vascular: 2+ peripheral pulses  Neurological: A/O x 3, no focal deficits

## 2017-06-05 NOTE — PROGRESS NOTE ADULT - PROBLEM SELECTOR PLAN 1
s/p Hartmanns/ileocolic resection  POD #10  Persistent leukocytosis/Low grade temp  On vanco/zosyn/diflucan  CCT-> perihepatic ascites/fluid collection  s/p IR/perc draninage for dx and Tx purposes: await cultures/cont vanco/zosyn
s/p Hartmanns/ileocolic resection  POD #11  WBC dropping- aggree with monitoring off abx  Adv diet per primary team  On vanco/zosyn/diflucan  CCT-> perihepatic ascites/fluid collection  s/p IR/perc draninage for dx and Tx purposes: await cultures: ngtd
s/p Hartmanns/ileocolic resection  POD #12  WBC dropping- agree with monitoring off abx  Adv diet per primary team  On vanco/zosyn/diflucan  CCT-> perihepatic ascites/fluid collection  s/p IR/perc draninage for dx and Tx purposes: await cultures: ngtd
s/p Hartmanns/ileocolic resection  POD #14  WBC dropping- agree with monitoring off abx  Adv diet per primary team  On vanco/zosyn/diflucan  CCT-> perihepatic ascites/fluid collection  s/p IR/perc draninage for dx and Tx purposes: await cultures: ngtd  Has rec's 14 days of abx. Low grade temp are likely inflmmatory
s/p Hartmanns/ileocolic resection  POD #16  WBC dropping- agree with monitoring off abx  Adv diet per primary team  On vanco/zosyn/diflucan  CCT-> perihepatic ascites/fluid collection  s/p IR/perc draninage for dx and Tx purposes: await cultures: ngtd  Has rec's 14 days of abx. Low grade temp are likely inflammatory
s/p Hartmanns/ileocolic resection  POD #9  Persistent leukocytosis/Low grade temp  On vanco/zosyn/diflucan  CCT-> perihepatic ascites  For IR/perc draninage for dx and Tx purposes

## 2017-06-05 NOTE — CONSULT NOTE ADULT - SUBJECTIVE AND OBJECTIVE BOX
Chief Complaint:    HPI:  47M admitted for a 24hr h/o progressive abdominal pain that began after a 1-2 day trip to [facility]. Pt was found with diverticulitis of intestines with perforation, requiring surgical intervention. Psych consulted per wife's request.     Upon interview, pt is alert and oriented x3, calm, cooperative, pleasant. Pt denies any past psychiatric history, and states that this is his first surgery which made him feel somewhat anxious especially that there was slight confusion after the surgery, possible anesthesia induced. Denies depression, denies SI, denies current anxiety, denies AH/VH/paranoia, no manic sx present. Pt is , owns an electrical supplies business, has 2 daughters aged 10 and 7, and lives with his family in a private residence. Some work related stress, though nothing unusual.     As per staff, pt has been pleasant and there were no psychiatric sx displayed. Wife requested consult.        Current Psychiatric Tx  Provider: none  Meds: none    Social Hx  Employment: self employed  Substance abuse:  denies  Living situation: private residence, domesticated     Past Psychiatric Hx  Past hospitalizations: none  Past rehab: none  Past suicidality/aggression: none    Psychiatric/substance abuse Family Hx: denies    Review of Sx: abdominal discomfort, denies all sx    Access to guns: no    Risk assessment : low    Mental Status Exam  Oriented x3 and to situation  General Appearance:  well nourished, average build, no deformities present, good grooming, good hygiene  Behavior: cooperative, good eye contact, well related  Muscle tone/ Strength: No abnormal movements  Gait/Station: wnl  Speech: normal volume and rate, spontaneous, normal articulation  Mood: euthymic  Affect: congruent full range  Thought Process: wnl, linear, goal oriented, normal reasoning  Thought Associations: normal  Thought Content: wnl  Perceptions: wnl  Attention/Concentration: wnl  Recent Memory: wnl  Remote Memory: wnl  Fund of knowledge: [facility]  Language: [facility], English speaking  Judgement : good  Insight: good    Labs:   06-05    136  |  104  |  7   ----------------------------<  92  3.9   |  28  |  0.78    Ca    7.7<L>      05 Jun 2017 05:48  Phos  2.6     06-05  Mg     2.3     06-05                          10.4   14.2  )-----------( 442      ( 05 Jun 2017 05:48 )             29.6

## 2017-06-05 NOTE — PROGRESS NOTE ADULT - SUBJECTIVE AND OBJECTIVE BOX
Discussion with family at bedside regarding interval development, since his last CT scan 5/29, of leak at ileocolic anastomosis accumulating within subhepatic space.  Radiology read reviewed with Dr. Shultz of IR - findings mostly of air, though oral contrast and stool is also present. There is contrast that continues to flow through transverse colon and into colostomy. Pt has been otherwise clinically stable with low grade fever last night of 38.3 and mild leukocytosis of 14.   Plan is to remove suprahepatic drain, and place large drain within the GB fossa to allow for creation of a controlled fistula. Pt has been made NPO except meds, and consult has been placed to GI for TPN. Octreotide also started tid. Family informed that timing for when the leak may potentially close is uncertain and that he may require long term TPN via PICC, including upon discharge. Zosyn started empirically. They understand that his discharge is currently on hold for another possible 7-10 days.   All questions answered to their satisfaction.  RLDEVIN LUIS M drain removed at bedside without incident.

## 2017-06-05 NOTE — PROGRESS NOTE ADULT - SUBJECTIVE AND OBJECTIVE BOX
No c/o. Tm 38.3, WBC 14. Jil po. No abdominal pain.     MEDICATIONS  (STANDING):  heparin  Injectable 5000Unit(s) SubCutaneous every 8 hours  ALBUTerol/ipratropium for Nebulization 3milliLiter(s) Nebulizer every 6 hours  furosemide   Injectable 40milliGRAM(s) IV Push daily  pantoprazole    Tablet 40milliGRAM(s) Oral before breakfast    MEDICATIONS  (PRN):  naloxone Injectable 0.1milliGRAM(s) IV Push every 3 minutes PRN For ANY of the following changes in patient status:  A. RR LESS THAN 10 breaths per minute, B. Oxygen saturation LESS THAN 90%, C. Sedation score of 6  ondansetron Injectable 4milliGRAM(s) IV Push every 6 hours PRN Nausea  ondansetron Injectable 4milliGRAM(s) IV Push every 6 hours PRN Nausea  zolpidem 5milliGRAM(s) Oral at bedtime PRN Insomnia  melatonin Oral Tab/Cap - Peds 3milliGRAM(s) Oral at bedtime PRN Insomnia  morphine  - Injectable 2milliGRAM(s) IV Push every 4 hours PRN Moderate Pain (4 - 6)  oxyCODONE  5 mG/acetaminophen 325 mG 1Tablet(s) Oral every 4 hours PRN Mild Pain (1 - 3)  oxyCODONE  5 mG/acetaminophen 325 mG 2Tablet(s) Oral every 4 hours PRN Moderate Pain (4 - 6)    Vital Signs Last 24 Hrs  T(C): 36.9, Max: 38.3 (06-04 @ 14:34)  T(F): 98.5, Max: 100.9 (06-04 @ 14:34)  HR: 77 (72 - 96)  BP: 122/65 (103/37 - 123/70)  BP(mean): 78 (52 - 80)  RR: 18 (15 - 23)  SpO2: 99% (96% - 100%)  I&O's Detail    I & Os for current day (as of 05 Jun 2017 09:09)  =============================================  IN:    Total IN: 0 ml  ---------------------------------------------  OUT:    Voided: 3850 ml    Colostomy: 900 ml    Bulb: 80 ml serous     Total OUT: 4830 ml  ---------------------------------------------  Total NET: -4830 ml    ABD exam :                        10.4   14.2  )-----------( 442      ( 05 Jun 2017 05:48 )             29.6     06-05    136  |  104  |  7   ----------------------------<  92  3.9   |  28  |  0.78    Ca    7.7<L>      05 Jun 2017 05:48  Phos  2.6     06-05  Mg     2.3     06-05    A/P -   CT A/P pending to ensure fluid collections have resolved prior to removal of LUIS M and IR perihepatic drain.  D/C planning in 1-2 days.

## 2017-06-05 NOTE — PROGRESS NOTE ADULT - PROBLEM SELECTOR PLAN 3
2/2 to ATN from septic shock-> both resolved

## 2017-06-05 NOTE — PROVIDER CONTACT NOTE (OTHER) - SITUATION
low phos
MD at bedside aware of consult
MD aware of lactate level 3.0
MD aware via hospitalist admit phone for consult for medical management
MD office made aware of return call needed
Message left on PA phone for return phone call
Office made aware of consult
office made aware of consult
requested  by  Dr Wing

## 2017-06-05 NOTE — PROGRESS NOTE ADULT - ASSESSMENT
47M admitted for a 24hr h/o progressive abdominal pain that began after a 1-2 day trip to [facility]. CT with acute perforated sigmoid diverticulitis, s/p LAP with resection (Hartmanns/ileocolic resection)POD #9 complicated by septic shock secondary to the above presently off pressor support. Rec'd large of INF for post fluid resuscitation now with anasarca

## 2017-06-05 NOTE — PROGRESS NOTE ADULT - PROBLEM SELECTOR PROBLEM 3
HARDIK (acute kidney injury)

## 2017-06-05 NOTE — PROGRESS NOTE ADULT - PROBLEM SELECTOR PLAN 2
decrease diuretics 40 qd- cont current dose  secondary to post fluid resucitation  Stop fluids  Ambulate
decrease diuretics 40 qd- cont current dose and consider switch to 40 po tomorrow  secondary to post fluid resucitation  Stop fluids  Ambulate
increase diuretics 40 bid, may consider albumin  secondary to post fluid resucitation  Stop fluids  Ambulate
increase diuretics 40 bid- cont current dose  secondary to post fluid resucitation  Stop fluids  Ambulate
resolved  stop diuretics  Ambulate
increase diuretics  Stop fluids  Ambulate

## 2017-06-06 LAB
ANION GAP SERPL CALC-SCNC: 6 MMOL/L — SIGNIFICANT CHANGE UP (ref 5–17)
BUN SERPL-MCNC: 8 MG/DL — SIGNIFICANT CHANGE UP (ref 7–23)
CALCIUM SERPL-MCNC: 8.3 MG/DL — LOW (ref 8.5–10.1)
CHLORIDE SERPL-SCNC: 101 MMOL/L — SIGNIFICANT CHANGE UP (ref 96–108)
CO2 SERPL-SCNC: 29 MMOL/L — SIGNIFICANT CHANGE UP (ref 22–31)
CREAT SERPL-MCNC: 0.9 MG/DL — SIGNIFICANT CHANGE UP (ref 0.5–1.3)
GLUCOSE SERPL-MCNC: 102 MG/DL — HIGH (ref 70–99)
HCT VFR BLD CALC: 32 % — LOW (ref 39–50)
HGB BLD-MCNC: 10.8 G/DL — LOW (ref 13–17)
MAGNESIUM SERPL-MCNC: 2.3 MG/DL — SIGNIFICANT CHANGE UP (ref 1.6–2.6)
MCHC RBC-ENTMCNC: 30 PG — SIGNIFICANT CHANGE UP (ref 27–34)
MCHC RBC-ENTMCNC: 33.9 GM/DL — SIGNIFICANT CHANGE UP (ref 32–36)
MCV RBC AUTO: 88.5 FL — SIGNIFICANT CHANGE UP (ref 80–100)
PHOSPHATE SERPL-MCNC: 3.9 MG/DL — SIGNIFICANT CHANGE UP (ref 2.5–4.5)
PLATELET # BLD AUTO: 520 K/UL — HIGH (ref 150–400)
POTASSIUM SERPL-MCNC: 4.5 MMOL/L — SIGNIFICANT CHANGE UP (ref 3.5–5.3)
POTASSIUM SERPL-SCNC: 4.5 MMOL/L — SIGNIFICANT CHANGE UP (ref 3.5–5.3)
RBC # BLD: 3.61 M/UL — LOW (ref 4.2–5.8)
RBC # FLD: 12.4 % — SIGNIFICANT CHANGE UP (ref 10.3–14.5)
SODIUM SERPL-SCNC: 136 MMOL/L — SIGNIFICANT CHANGE UP (ref 135–145)
WBC # BLD: 13.5 K/UL — HIGH (ref 3.8–10.5)
WBC # FLD AUTO: 13.5 K/UL — HIGH (ref 3.8–10.5)

## 2017-06-06 PROCEDURE — 49405 IMAGE CATH FLUID COLXN VISC: CPT

## 2017-06-06 PROCEDURE — 71010: CPT | Mod: 26

## 2017-06-06 RX ORDER — MEROPENEM 1 G/30ML
1000 INJECTION INTRAVENOUS EVERY 8 HOURS
Qty: 0 | Refills: 0 | Status: DISCONTINUED | OUTPATIENT
Start: 2017-06-06 | End: 2017-06-13

## 2017-06-06 RX ORDER — SODIUM CHLORIDE 9 MG/ML
1000 INJECTION INTRAMUSCULAR; INTRAVENOUS; SUBCUTANEOUS
Qty: 0 | Refills: 0 | Status: DISCONTINUED | OUTPATIENT
Start: 2017-06-06 | End: 2017-06-06

## 2017-06-06 RX ORDER — ONDANSETRON 8 MG/1
4 TABLET, FILM COATED ORAL ONCE
Qty: 0 | Refills: 0 | Status: DISCONTINUED | OUTPATIENT
Start: 2017-06-06 | End: 2017-06-06

## 2017-06-06 RX ORDER — FENTANYL CITRATE 50 UG/ML
50 INJECTION INTRAVENOUS
Qty: 0 | Refills: 0 | Status: DISCONTINUED | OUTPATIENT
Start: 2017-06-06 | End: 2017-06-06

## 2017-06-06 RX ORDER — ELECTROLYTE SOLUTION,INJ
1 VIAL (ML) INTRAVENOUS
Qty: 0 | Refills: 0 | Status: DISCONTINUED | OUTPATIENT
Start: 2017-06-06 | End: 2017-06-06

## 2017-06-06 RX ORDER — ACETAMINOPHEN 500 MG
1000 TABLET ORAL ONCE
Qty: 0 | Refills: 0 | Status: COMPLETED | OUTPATIENT
Start: 2017-06-06 | End: 2017-06-06

## 2017-06-06 RX ADMIN — HEPARIN SODIUM 5000 UNIT(S): 5000 INJECTION INTRAVENOUS; SUBCUTANEOUS at 05:11

## 2017-06-06 RX ADMIN — SODIUM CHLORIDE 100 MILLILITER(S): 9 INJECTION INTRAMUSCULAR; INTRAVENOUS; SUBCUTANEOUS at 15:34

## 2017-06-06 RX ADMIN — HEPARIN SODIUM 5000 UNIT(S): 5000 INJECTION INTRAVENOUS; SUBCUTANEOUS at 22:37

## 2017-06-06 RX ADMIN — Medication 400 MILLIGRAM(S): at 15:32

## 2017-06-06 RX ADMIN — OCTREOTIDE ACETATE 100 MICROGRAM(S): 200 INJECTION, SOLUTION INTRAVENOUS; SUBCUTANEOUS at 22:37

## 2017-06-06 RX ADMIN — Medication 1 EACH: at 22:38

## 2017-06-06 RX ADMIN — Medication 1000 MILLIGRAM(S): at 15:32

## 2017-06-06 RX ADMIN — MEROPENEM 200 MILLIGRAM(S): 1 INJECTION INTRAVENOUS at 15:30

## 2017-06-06 RX ADMIN — PIPERACILLIN AND TAZOBACTAM 25 GRAM(S): 4; .5 INJECTION, POWDER, LYOPHILIZED, FOR SOLUTION INTRAVENOUS at 05:11

## 2017-06-06 RX ADMIN — PANTOPRAZOLE SODIUM 40 MILLIGRAM(S): 20 TABLET, DELAYED RELEASE ORAL at 05:11

## 2017-06-06 RX ADMIN — MEROPENEM 200 MILLIGRAM(S): 1 INJECTION INTRAVENOUS at 22:37

## 2017-06-06 RX ADMIN — OCTREOTIDE ACETATE 100 MICROGRAM(S): 200 INJECTION, SOLUTION INTRAVENOUS; SUBCUTANEOUS at 05:12

## 2017-06-06 NOTE — PROGRESS NOTE ADULT - ASSESSMENT
s/p hartmans procedure for perforated diverticulitis as well as ileocolic resection with anastomosis. Has anastomotic leak but stable.    Plan for IR drain placement today, NPO, bowel rest and placement of PICC line for TPN.

## 2017-06-06 NOTE — PROGRESS NOTE ADULT - SUBJECTIVE AND OBJECTIVE BOX
Stable overnight. Denies pain. No fevers.    Exam:  Vital Signs Last 24 Hrs  T(C): 36.1, Max: 37.1 (06-05 @ 14:23)  T(F): 97, Max: 98.7 (06-05 @ 14:23)  HR: 78 (67 - 91)  BP: 110/61 (110/61 - 125/67)  BP(mean): 71 (66 - 80)  RR: 20 (16 - 25)  SpO2: 97% (95% - 98%)    General: alert, in no distress  Abdomen: soft, non tender, incision intact with staples, colostomy with air and stool                          10.8   13.5  )-----------( 520      ( 06 Jun 2017 06:47 )             32.0   06-06    136  |  101  |  8   ----------------------------<  102<H>  4.5   |  29  |  0.90    Ca    8.3<L>      06 Jun 2017 06:47  Phos  3.9     06-06  Mg     2.3     06-06

## 2017-06-06 NOTE — PROGRESS NOTE ADULT - SUBJECTIVE AND OBJECTIVE BOX
HPI:  47M with no significant past medical history admitted on 5/19 for evaluation of lower abdominal pain, decreased appetite and upon admission was found to have sigmoid diverticulitis; patient was medically managed however, on 5/21 patient underwent sigmoid resection, ileocolostomy and currently is post op asking for ice chips. He has decreased urine output and worsening renal function as well as hypotension.  hospital course c/b septic shock/renal failure post-operatively, requiring pressor support/icu admission, found to have perihepatic collection s/p drainage on 5/30, noted to be clinically improving, but with low grade temps on 6/4  underwent repeat CT abd/pelvis 6/5 showing anastomotic leak/persistent peritonitis with new R abd collection  plan for IR drainage of collection 6/6 and PICC line for TPN   no abd pain currently, afebrile      MEDICATIONS  (STANDING):  heparin  Injectable 5000Unit(s) SubCutaneous every 8 hours  ALBUTerol/ipratropium for Nebulization 3milliLiter(s) Nebulizer every 6 hours  pantoprazole    Tablet 40milliGRAM(s) Oral before breakfast  octreotide  Injectable 100MICROGram(s) SubCutaneous every 8 hours  sodium chloride 0.9%. 1000milliLiter(s) IV Continuous <Continuous>  Parenteral Nutrition - Adult 1Each TPN Continuous <Continuous>  meropenem IVPB 1000milliGRAM(s) IV Intermittent every 8 hours      Vital Signs Last 24 Hrs  T(C): 37.4, Max: 37.4 (06-06 @ 08:48)  T(F): 99.3, Max: 99.3 (06-06 @ 08:48)  HR: 67 (67 - 91)  BP: 109/63 (109/63 - 125/67)  BP(mean): 73 (66 - 80)  RR: 12 (12 - 25)  SpO2: 97% (95% - 98%)        Physical Exam:  Constitutional: well developed  HEENT: NC/AT, EOMI, PERRLA  Neck: supple  Respiratory: clear  Cardiovascular: S1S2 regular, no murmurs  Abdomen: kimmy drain in place, dressing in place left sided ostomy  Genitourinary: deferred  Rectal: deferred  Musculoskeletal: no muscle tenderness, no joint swelling or tenderness  Neurological: AxOx3, moving all extremities, no focal deficits  Skin: no rashes    Labs:                                   10.8   13.5  )-----------( 520      ( 06 Jun 2017 06:47 )             32.0     06-06    136  |  101  |  8   ----------------------------<  102<H>  4.5   |  29  |  0.90    Ca    8.3<L>      06 Jun 2017 06:47  Phos  3.9     06-06  Mg     2.3     06-06              Cultures: Culture - Blood (05.23.17 @ 11:20)    Gram Stain:   Growth in anaerobic bottle: Gram Positive Cocci in Clusters    Specimen Source: .Blood Blood    Culture Results:   Growth in anaerobic bottle: Coag Negative Staphylococcus  Single set isolate, possible contaminant. Contact  Microbiology if susceptibility testing clinically  indicated.    Culture - Body Fluid with Gram Stain (05.30.17 @ 16:30)    Gram Stain:   polymorphonuclear leukocytes seen  No organisms seen  by cytocentrifuge    Specimen Source: Abdominal Fl None    Culture Results:   No growth to date            Radiology:EXAM:  CT ABDOMEN AND PELVIS IC                            PROCEDURE DATE:  05/19/2017        INTERPRETATION:  CLINICAL INFORMATION: 47-year-old man with abdominal   pain assess for appendicitis     TECHNIQUE:    CT of abdomen and pelvis was performed with axial images   were obtained from the diaphragm to pubic symphysis oral and IV contrast.    COMPARISON:  None.    FINDINGS:    Liver: Borderline hepatomegaly with mild fatty infiltration otherwise   within normal limits  Gallbladder: Within normal limits  Bile ducts: No intrahepatic or extrahepatic biliary dilatation  Pancreas: within normal limits    Spleen: within normal limits  Adrenal: within normal limits  Kidneys, Ureters and Bladder: Symmetric enhancement bilaterally. No   perinephric attending or collections. No hydronephrosis. No intrarenal   calculi. Normal caliber of the ureters. Limited unopacified nondistended   bladder.    Pelvis: No pelvic adenopathy or pelvic free fluid.  Small fat-containing   bilateral inguinal hernias.      Bowel: No bowel obstruction.  There are few scattered colonic   diverticula. There is focal thickening of sigmoid colon in the pelvis   associated with mesenteric fat stranding and thickening of adjacent   fascial planes with localized perforation and small air bubbles without   abscess. Findings are consistent with acute rupture diverticulitis. Small   free fluid adjacent to gas filled appendix.    Peritoneum: Small ascites, no organized fluid collections.    Retroperitoneum: within normal limits  Vessels: Patent.    Abdominal wall: Small fat-containing umbilical hernia.    Lower chest and lung Bases: The visualized lung bases clear except for   subsegmental dependent atelectasis. Heart normal in size.   Bones: Degenerative changes.     IMPRESSION:     Focal thickening of sigmoid colon with mesenteric fat stranding and   thickening of fascial planes with scattered adjacent air bubbles   consistent with acute diverticulitis without abscess. Small free fluid in   the abdomen extending to the right quadrant.    EXAM:  CT ABDOMEN AND PELVIS OC IC                            PROCEDURE DATE:  05/29/2017        INTERPRETATION:  CT OF THE ABDOMEN AND PELVIS WITH IV CONTRAST     CLINICAL INDICATION: diverticulitis, sepsis s/p hartmans    TECHNIQUE: CT scan of the abdomen and pelvis was performed from the domes   of the diaphragm to the symphysis pubis with oral and intravenous   contrast.  Intravenous administration of 90 cc of Omnipaque-350, 10 cc   discarded, was without complication.  Sagittal and coronalreformatted   images were provided.    COMPARISON: CT abdomen pelvis May 19, 2017    FINDINGS:   LOWER THORAX: Moderate bilateral pleural effusions with underlying   compressive atelectasis..    LIVER:  Moderate perihepatic ascites and adjacent free air..  GALLBLADDER: Unremarkable.  BILIARY TREE: Unremarkable.  PANCREAS: Unremarkable.  SPLEEN: Unremarkable.  ADRENALS: There is new expansion of the right adrenal gland with   nonsimple fluid/material, measuring approximately 4.0 x 2.6 cm, may   reflect hemorrhage within the right adrenal gland. Left adrenal gland   appears unremarkable.    KIDNEYS/URETERS: Unremarkable.    BOWEL: Status post sigmoid resection with left lower quadrant colostomy   present and suture material at the rectal stump.Additionally, partial   resection and anastomosis in the region of the cecum is present. There is   peripheral luminal air within the ascending bowel wall and cecum. There   is a surgical drainage catheter with tip in the mid pelvis. No focal   fluidcollection or abscess.    PERITONEUM/RETROPERITONEUM: Extensive free air in the upper abdomen,   compatible with recent surgery. Moderate perihepatic ascites and minimal   fluid in the bilateral paracolic gutters is present.    BLADDER: Small amount of air in the bladder, likely reflecting recent   Cooley catheterization.  REPRODUCTIVE ORGANS: Unremarkable.    VASCULATURE: Within normal limits.  ABDOMINAL WALL: Postsurgical changes within the abdominal wall are   present. Diffuse edema of the abdominal wall. Midline surgical staples   present.    BONES: No aggressive osseous lesion.    IMPRESSION:    Status post sigmoid resection with left lower quadrant colostomy present   and suture material at the rectal stump. Additionally, partial resection   and anastomosis in the region of the cecum is present. There is   peripheral luminal air within the ascending bowel wall and cecum. There   is a surgical drainage catheter with tip in the mid pelvis. No focal   fluid collection or abscess. Moderate perihepatic ascites and minimal   fluid in the bilateral paracolic gutters is present.    New expansion of the right adrenal gland with hyperattenuating material,   measuring approximately 4.0 x 2.6 cm, may reflect hemorrhage within the   right adrenal gland.     Moderate bilateral pleural effusions with underlying compressive   atelectasis..    Rest of the findings as above.    Advanced directive addressed: full resuscitation HPI:  47M with no significant past medical history admitted on 5/19 for evaluation of lower abdominal pain, decreased appetite and upon admission was found to have sigmoid diverticulitis; patient was medically managed however, on 5/21 patient underwent sigmoid resection, ileocolostomy and currently is post op asking for ice chips. He has decreased urine output and worsening renal function as well as hypotension.  hospital course c/b septic shock/renal failure post-operatively, requiring pressor support/icu admission, found to have perihepatic collection s/p drainage on 5/30, noted to be clinically improving, but with low grade temps on 6/4  underwent repeat CT abd/pelvis 6/5 showing anastomotic leak/persistent peritonitis with new R abd collection  plan for IR drainage of collection 6/6 and PICC line for TPN   no abd pain currently, afebrile      MEDICATIONS  (STANDING):  heparin  Injectable 5000Unit(s) SubCutaneous every 8 hours  ALBUTerol/ipratropium for Nebulization 3milliLiter(s) Nebulizer every 6 hours  pantoprazole    Tablet 40milliGRAM(s) Oral before breakfast  octreotide  Injectable 100MICROGram(s) SubCutaneous every 8 hours  sodium chloride 0.9%. 1000milliLiter(s) IV Continuous <Continuous>  Parenteral Nutrition - Adult 1Each TPN Continuous <Continuous>  meropenem IVPB 1000milliGRAM(s) IV Intermittent every 8 hours      Vital Signs Last 24 Hrs  T(C): 37.4, Max: 37.4 (06-06 @ 08:48)  T(F): 99.3, Max: 99.3 (06-06 @ 08:48)  HR: 67 (67 - 91)  BP: 109/63 (109/63 - 125/67)  BP(mean): 73 (66 - 80)  RR: 12 (12 - 25)  SpO2: 97% (95% - 98%)        Physical Exam:  Constitutional: well developed  HEENT: NC/AT, EOMI, PERRLA  Neck: supple  Respiratory: clear  Cardiovascular: S1S2 regular, no murmurs  Abdomen: kimmy drain in place, dressing in place left sided ostomy  Genitourinary: deferred  Rectal: deferred  Musculoskeletal: no muscle tenderness, no joint swelling or tenderness  Neurological: AxOx3, moving all extremities, no focal deficits  Skin: no rashes    Labs:                                   10.8   13.5  )-----------( 520      ( 06 Jun 2017 06:47 )             32.0     06-06    136  |  101  |  8   ----------------------------<  102<H>  4.5   |  29  |  0.90    Ca    8.3<L>      06 Jun 2017 06:47  Phos  3.9     06-06  Mg     2.3     06-06              Cultures: Culture - Blood (05.23.17 @ 11:20)    Gram Stain:   Growth in anaerobic bottle: Gram Positive Cocci in Clusters    Specimen Source: .Blood Blood    Culture Results:   Growth in anaerobic bottle: Coag Negative Staphylococcus  Single set isolate, possible contaminant. Contact  Microbiology if susceptibility testing clinically  indicated.    Culture - Body Fluid with Gram Stain (05.30.17 @ 16:30)    Gram Stain:   polymorphonuclear leukocytes seen  No organisms seen  by cytocentrifuge    Specimen Source: Abdominal Fl None    Culture Results:   No growth to date            Radiology:  EXAM:  CT ABDOMEN AND PELVIS OC IC                            PROCEDURE DATE:  06/05/2017        INTERPRETATION:  CT ABDOMEN AND PELVIS OC IC    HISTORY:  perforated diverticulitis s/p Hartmans,    Technique: CT of the abdomen and pelvis is performed with oral and   intravenous contrast. Axial images are supplemented with coronal and   sagittal reformations. This study was performed using automatic exposure   control (radiation dose reduction software) to obtain a diagnostic image   quality scan with patient dose as low as reasonably achievable.    Contrast: 90 cc Omnipaque 350    Comparison: CT abdomen and pelvis 5/29/2017    Findings:  LIVER: Normal.  SPLEEN: Normal.  PANCREAS: Normal.  GALLBLADDER/BILIARY TREE: Nondilated. Normal gallbladder.  ADRENALS: Stable right adrenal hemorrhage.  KIDNEYS: No calcification, hydronephrosis, or renal mass.  LYMPHADENOPATHY/RETROPERITONEUM: Prominent upper abdominal lymph nodes.  VASCULATURE: Normal caliber aorta.    BOWEL: Stable postsurgical anatomy after [employer] and ileocolic   anastomosis. Normal appearance of the rectal stump with an adjacent drain   in place. Unremarkable left lower quadrant colostomy. Oral contrast   material reaches the colostomy. No bowel obstruction.    PELVIC VISCERA: Unremarkable prostate, seminal vesicles, and urinary   bladder.  PELVIC LYMPH NODES: No pelvic adenopathy.    PERITONEUM/ABDOMINAL WALL: Persistent small pneumoperitoneum with new   droplets of gas on the left. Right perihepatic pigtail catheter is in   place. Increased thickening of the perihepatic and Morison's pouch   peritoneal lining indicative of peritonitis. Stable small perihepatic   fluid. Slightly increased pelvic free fluid.     There is interval development of an air, fluid, and oral contrast   collection at the level of the gallbladder fossa measuring 8.4 x 5.0 cm   (series 2 image 47). The oral contrast material is seen tracking from the   superior aspect of the ileocolic anastomosis.    SKELETAL: No acute bony abnormality.  LUNG BASES: Stable small right pleural effusion with right base   atelectasis. Decreased trace left pleural effusion.    IMPRESSION:     Definitive evidence of an anastomotic leak at the superior aspect of the   ileocolic anastomosis with air, fluid,and oral contrast material   tracking into the gallbladder fossa with the collection measuring 8.4 x   5.0 cm. Persistent pneumoperitoneum and free fluid. Right upper quadrant   drain in place. Thickening of the right-sided peritoneal lining   indicative of peritonitis.    These results were communicated to Dr. Boss by me over telephone at 1:45   PM on 6/5/2017.      EXAM:  CT ABDOMEN AND PELVIS IC                            PROCEDURE DATE:  05/19/2017        INTERPRETATION:  CLINICAL INFORMATION: 47-year-old man with abdominal   pain assess for appendicitis     TECHNIQUE:    CT of abdomen and pelvis was performed with axial images   were obtained from the diaphragm to pubic symphysis oral and IV contrast.    COMPARISON:  None.    FINDINGS:    Liver: Borderline hepatomegaly with mild fatty infiltration otherwise   within normal limits  Gallbladder: Within normal limits  Bile ducts: No intrahepatic or extrahepatic biliary dilatation  Pancreas: within normal limits    Spleen: within normal limits  Adrenal: within normal limits  Kidneys, Ureters and Bladder: Symmetric enhancement bilaterally. No   perinephric attending or collections. No hydronephrosis. No intrarenal   calculi. Normal caliber of the ureters. Limited unopacified nondistended   bladder.    Pelvis: No pelvic adenopathy or pelvic free fluid.  Small fat-containing   bilateral inguinal hernias.      Bowel: No bowel obstruction.  There are few scattered colonic   diverticula. There is focal thickening of sigmoid colon in the pelvis   associated with mesenteric fat stranding and thickening of adjacent   fascial planes with localized perforation and small air bubbles without   abscess. Findings are consistent with acute rupture diverticulitis. Small   free fluid adjacent to gas filled appendix.    Peritoneum: Small ascites, no organized fluid collections.    Retroperitoneum: within normal limits  Vessels: Patent.    Abdominal wall: Small fat-containing umbilical hernia.    Lower chest and lung Bases: The visualized lung bases clear except for   subsegmental dependent atelectasis. Heart normal in size.   Bones: Degenerative changes.     IMPRESSION:     Focal thickening of sigmoid colon with mesenteric fat stranding and   thickening of fascial planes with scattered adjacent air bubbles   consistent with acute diverticulitis without abscess. Small free fluid in   the abdomen extending to the right quadrant.    EXAM:  CT ABDOMEN AND PELVIS OC IC                            PROCEDURE DATE:  05/29/2017        INTERPRETATION:  CT OF THE ABDOMEN AND PELVIS WITH IV CONTRAST     CLINICAL INDICATION: diverticulitis, sepsis s/p hartmans    TECHNIQUE: CT scan of the abdomen and pelvis was performed from the domes   of the diaphragm to the symphysis pubis with oral and intravenous   contrast.  Intravenous administration of 90 cc of Omnipaque-350, 10 cc   discarded, was without complication.  Sagittal and coronalreformatted   images were provided.    COMPARISON: CT abdomen pelvis May 19, 2017    FINDINGS:   LOWER THORAX: Moderate bilateral pleural effusions with underlying   compressive atelectasis..    LIVER:  Moderate perihepatic ascites and adjacent free air..  GALLBLADDER: Unremarkable.  BILIARY TREE: Unremarkable.  PANCREAS: Unremarkable.  SPLEEN: Unremarkable.  ADRENALS: There is new expansion of the right adrenal gland with   nonsimple fluid/material, measuring approximately 4.0 x 2.6 cm, may   reflect hemorrhage within the right adrenal gland. Left adrenal gland   appears unremarkable.    KIDNEYS/URETERS: Unremarkable.    BOWEL: Status post sigmoid resection with left lower quadrant colostomy   present and suture material at the rectal stump.Additionally, partial   resection and anastomosis in the region of the cecum is present. There is   peripheral luminal air within the ascending bowel wall and cecum. There   is a surgical drainage catheter with tip in the mid pelvis. No focal   fluidcollection or abscess.    PERITONEUM/RETROPERITONEUM: Extensive free air in the upper abdomen,   compatible with recent surgery. Moderate perihepatic ascites and minimal   fluid in the bilateral paracolic gutters is present.    BLADDER: Small amount of air in the bladder, likely reflecting recent   Cooley catheterization.  REPRODUCTIVE ORGANS: Unremarkable.    VASCULATURE: Within normal limits.  ABDOMINAL WALL: Postsurgical changes within the abdominal wall are   present. Diffuse edema of the abdominal wall. Midline surgical staples   present.    BONES: No aggressive osseous lesion.    IMPRESSION:    Status post sigmoid resection with left lower quadrant colostomy present   and suture material at the rectal stump. Additionally, partial resection   and anastomosis in the region of the cecum is present. There is   peripheral luminal air within the ascending bowel wall and cecum. There   is a surgical drainage catheter with tip in the mid pelvis. No focal   fluid collection or abscess. Moderate perihepatic ascites and minimal   fluid in the bilateral paracolic gutters is present.    New expansion of the right adrenal gland with hyperattenuating material,   measuring approximately 4.0 x 2.6 cm, may reflect hemorrhage within the   right adrenal gland.     Moderate bilateral pleural effusions with underlying compressive   atelectasis..    Rest of the findings as above.    Advanced directive addressed: full resuscitation

## 2017-06-06 NOTE — CHART NOTE - NSCHARTNOTEFT_GEN_A_CORE
Current TPN order provides 1690 kcal and 85 g protein. TPN meets 70% of calorie needs and 73% of protein needs. Recommend increasing dextrose and amino acids.    Recommend:  2000ml solution  115g amino acids  350g Dextrose  Goal rate 83 ml/hr  Piggyback: 250 ml 20% lipids    TPN recommendation will provide 100% of protein needs and 90% of estimated caloric needs.

## 2017-06-06 NOTE — CONSULT NOTE ADULT - ASSESSMENT
Imp:  Anastomotic leak after ileocolic resection and sigmoid resection/colostomy for diverticulitis    Rec:  Start TPN  Octreotide

## 2017-06-06 NOTE — CONSULT NOTE ADULT - SUBJECTIVE AND OBJECTIVE BOX
HPI:  47M admitted 5/19 for sigmoid diverticulitis. Underwent sigoid colostomy/Eliud and ileocolic resection. Recent CT shows evidence of anastomotic leak from ileo-colic anastomosis. Plan is for TPN octreotide.      PAST MEDICAL & SURGICAL HISTORY:  Fatty liver  Diverticulitis surgery as above      MEDICATIONS  (STANDING):  heparin  Injectable 5000Unit(s) SubCutaneous every 8 hours  ALBUTerol/ipratropium for Nebulization 3milliLiter(s) Nebulizer every 6 hours  pantoprazole    Tablet 40milliGRAM(s) Oral before breakfast  octreotide  Injectable 100MICROGram(s) SubCutaneous every 8 hours  piperacillin/tazobactam IVPB. 3.375Gram(s) IV Intermittent every 8 hours  sodium chloride 0.9%. 1000milliLiter(s) IV Continuous <Continuous>    MEDICATIONS  (PRN):  naloxone Injectable 0.1milliGRAM(s) IV Push every 3 minutes PRN For ANY of the following changes in patient status:  A. RR LESS THAN 10 breaths per minute, B. Oxygen saturation LESS THAN 90%, C. Sedation score of 6  ondansetron Injectable 4milliGRAM(s) IV Push every 6 hours PRN Nausea  ondansetron Injectable 4milliGRAM(s) IV Push every 6 hours PRN Nausea  zolpidem 5milliGRAM(s) Oral at bedtime PRN Insomnia  melatonin Oral Tab/Cap - Peds 3milliGRAM(s) Oral at bedtime PRN Insomnia  morphine  - Injectable 2milliGRAM(s) IV Push every 4 hours PRN Moderate Pain (4 - 6)  oxyCODONE  5 mG/acetaminophen 325 mG 1Tablet(s) Oral every 4 hours PRN Mild Pain (1 - 3)  oxyCODONE  5 mG/acetaminophen 325 mG 2Tablet(s) Oral every 4 hours PRN Moderate Pain (4 - 6)      Allergies    No Known Allergies    Intolerances        SOCIAL HISTORY:  1/2 PPD. Minimal EtOH    FAMILY HISTORY:  No pertinent family history in first degree relatives      ROS  As above  Otherwise unremarkable    Vital Signs Last 24 Hrs  T(C): 36.1, Max: 37.1 (06-05 @ 14:23)  T(F): 97, Max: 98.7 (06-05 @ 14:23)  HR: 78 (67 - 91)  BP: 110/61 (110/61 - 125/67)  BP(mean): 71 (66 - 80)  RR: 20 (16 - 25)  SpO2: 97% (95% - 98%)    Constitutional: NAD, well-developed  Respiratory: CTAB  Cardiovascular: S1 and S2, RRR  Gastrointestinal: BS+, soft, LLQ colostomy  Extremities: No peripheral edema  Psychiatric: Normal mood, normal affect  Skin: No rashes    LABS:                        10.8   13.5  )-----------( 520      ( 06 Jun 2017 06:47 )             32.0     06-05    136  |  104  |  7   ----------------------------<  92  3.9   |  28  |  0.78    Ca    7.7<L>      05 Jun 2017 05:48  Phos  2.6     06-05  Mg     2.3     06-05

## 2017-06-06 NOTE — PROGRESS NOTE ADULT - SUBJECTIVE AND OBJECTIVE BOX
Subjective:    pat lying in bed, no respiratory complanit.    MEDICATIONS  (STANDING):  heparin  Injectable 5000Unit(s) SubCutaneous every 8 hours  ALBUTerol/ipratropium for Nebulization 3milliLiter(s) Nebulizer every 6 hours  pantoprazole    Tablet 40milliGRAM(s) Oral before breakfast  octreotide  Injectable 100MICROGram(s) SubCutaneous every 8 hours  sodium chloride 0.9%. 1000milliLiter(s) IV Continuous <Continuous>  Parenteral Nutrition - Adult 1Each TPN Continuous <Continuous>  meropenem IVPB 1000milliGRAM(s) IV Intermittent every 8 hours    MEDICATIONS  (PRN):  naloxone Injectable 0.1milliGRAM(s) IV Push every 3 minutes PRN For ANY of the following changes in patient status:  A. RR LESS THAN 10 breaths per minute, B. Oxygen saturation LESS THAN 90%, C. Sedation score of 6  ondansetron Injectable 4milliGRAM(s) IV Push every 6 hours PRN Nausea  ondansetron Injectable 4milliGRAM(s) IV Push every 6 hours PRN Nausea  zolpidem 5milliGRAM(s) Oral at bedtime PRN Insomnia  melatonin Oral Tab/Cap - Peds 3milliGRAM(s) Oral at bedtime PRN Insomnia  morphine  - Injectable 2milliGRAM(s) IV Push every 4 hours PRN Moderate Pain (4 - 6)  oxyCODONE  5 mG/acetaminophen 325 mG 1Tablet(s) Oral every 4 hours PRN Mild Pain (1 - 3)  oxyCODONE  5 mG/acetaminophen 325 mG 2Tablet(s) Oral every 4 hours PRN Moderate Pain (4 - 6)      Allergies    No Known Allergies    Intolerances        Vital Signs Last 24 Hrs  T(C): 37.4, Max: 37.4 (06-06 @ 08:48)  T(F): 99.3, Max: 99.3 (06-06 @ 08:48)  HR: 78 (67 - 91)  BP: 110/61 (110/61 - 125/67)  BP(mean): 71 (66 - 80)  RR: 20 (16 - 25)  SpO2: 97% (95% - 98%)    PHYSICAL EXAMINATION:    NECK:  Supple. No lymphadenopathy. Jugular venous pressure not elevated. Carotids equal.   HEART:   The cardiac impulse has a normal quality. Reg., Nl S1 and S2.  There are no murmurs, rubs or gallops noted  CHEST:  Chest crackles to auscultation. Normal respiratory effort.  ABDOMEN:  Soft and nontender.   EXTREMITIES:  There is no edema.       LABS:                        10.8   13.5  )-----------( 520      ( 06 Jun 2017 06:47 )             32.0     06-06    136  |  101  |  8   ----------------------------<  102<H>  4.5   |  29  |  0.90    Ca    8.3<L>      06 Jun 2017 06:47  Phos  3.9     06-06  Mg     2.3     06-06

## 2017-06-06 NOTE — PROGRESS NOTE ADULT - SUBJECTIVE AND OBJECTIVE BOX
SUBJECTIVE: No O/N events.  PICC today for TPN.  IR for drain.    REVIEW OF SYSTEMS:  All other review of systems is negative unless indicated above    Vital Signs Last 24 Hrs  T(C): 37.8, Max: 38 (06-06 @ 15:14)  T(F): 100, Max: 100.4 (06-06 @ 15:14)  HR: 65 (65 - 91)  BP: 116/60 (105/62 - 125/67)  BP(mean): 72 (66 - 81)  RR: 15 (12 - 25)  SpO2: 100% (95% - 100%)    PHYSICAL EXAM:  Constitutional: NAD, awake and alert, well-developed  HEENT: PERR, EOMI, Normal Hearing, MMM  Neck: Soft and supple, No LAD, No JVD  Respiratory: minimal rales to base  Cardiovascular: S1 and S2, regular rate and rhythm, no Murmurs, gallops or rubs  Gastrointestinal: Bowel Sounds present, soft, nontender, nondistended, colostomy bag: stump pink. LUIS M drain: serosanguinous fluid  Extremities: trace pitting pedal  edema, minimal scrotal edema  Vascular: 2+ peripheral pulses  Neurological: A/O x 3, no focal deficits    MEDICATIONS:  MEDICATIONS  (STANDING):  heparin  Injectable 5000Unit(s) SubCutaneous every 8 hours  ALBUTerol/ipratropium for Nebulization 3milliLiter(s) Nebulizer every 6 hours  pantoprazole    Tablet 40milliGRAM(s) Oral before breakfast  octreotide  Injectable 100MICROGram(s) SubCutaneous every 8 hours  sodium chloride 0.9%. 1000milliLiter(s) IV Continuous <Continuous>  Parenteral Nutrition - Adult 1Each TPN Continuous <Continuous>  meropenem IVPB 1000milliGRAM(s) IV Intermittent every 8 hours      LABS: All Labs Reviewed:                        10.8   13.5  )-----------( 520      ( 06 Jun 2017 06:47 )             32.0     06-06    136  |  101  |  8   ----------------------------<  102<H>  4.5   |  29  |  0.90    Ca    8.3<L>      06 Jun 2017 06:47  Phos  3.9     06-06  Mg     2.3     06-06    Assessment and Plan:   · Assessment		  47M admitted for a 24hr h/o progressive abdominal pain that began after a 1-2 day trip to [facility]. CT with acute perforated sigmoid diverticulitis, s/p LAP with resection (Hartmanns/ileocolic resection) complicated by septic shock secondary to the above presently off pressor support. Rec'd large of INF for post fluid resuscitation now with anasarca    Problem/Plan - 1:  ·  Problem: s/p hartmans procedure for perforated diverticulitis as well as ileocolic resection with anastomosis. Has anastomotic leak but stable.  WBC dropping- agree with monitoring off abx  Plan for IR drain placement today, NPO, bowel rest and placement of PICC line for TPN.  CCT-> perihepatic ascites/fluid collection  s/p IR/perc draninage for dx and Tx purposes: await cultures: ngtd  Has rec's 14 days of abx. Low grade temp are likely inflammatory.     Problem/Plan - 2:  ·  Problem: Anasarca.  Plan: resolved  stop diuretics  Ambulate.     Problem/Plan - 3:  ·  Problem: HARDIK (acute kidney injury).  Plan: 2/2 to ATN from septic shock-> both resolved.

## 2017-06-06 NOTE — PROGRESS NOTE ADULT - ASSESSMENT
47M with no significant past medical history admitted on 5/19 for evaluation of lower abdominal pain, decreased appetite and upon admission was found to have sigmoid diverticulitis; patient was medically managed however, on 5/21 patient underwent sigmoid resection, ileocolostomy and currently is post op asking for ice chips. He has decreased urine output and worsening renal function as well as hypotension  1. Post op leukocytosis, hypotension most likely secondary to peritonitis  - coagulase negative staph in blood, post surgery  -started on vancomycin 750 mg iv q 12 hours, given large surgical incision, will continue, day #8  - day #14 zosyn, day #11 diflucan  - tolerating antibiotics without rashes or side effects   - iv hydration and supportive care   -pressors now off  - slow improving  -will stop antibiotics today and observe  Will follow 47M with no significant past medical history admitted on 5/19 for evaluation of lower abdominal pain, decreased appetite and upon admission was found to have sigmoid diverticulitis; patient was medically managed however, on 5/21 patient underwent sigmoid resection, ileocolostomy and currently is post op asking for ice chips. He has decreased urine output and worsening renal function as well as hypotension  1. diverticulitis s/p sigmoid resection/ileostomy c/b sepsis & post-op collection s/p drainage now with persistent peritonitis/abd collection/anastomotic leak  - plan for IR drainage of gallbladder fossa collection   - plan for PICC line placement for TPN   - blood cx on 5/23 with 1 bottle growing CONs which is c/w contaminant, nonpathogenic  - afebrile currently and hd stable  - start IV meropenem after IR drainage 1gmq8h for gram (-) resistant/anaroebic coverage for gi tract  -previously completed course of vancomycin/diflucan/zosyn, antibiotics discontinued on 6/1  - tolerating antibiotics without rashes or side effects   - iv hydration and supportive care

## 2017-06-07 LAB
ALBUMIN SERPL ELPH-MCNC: 1.3 G/DL — LOW (ref 3.3–5)
ALP SERPL-CCNC: 101 U/L — SIGNIFICANT CHANGE UP (ref 40–120)
ALT FLD-CCNC: 24 U/L — SIGNIFICANT CHANGE UP (ref 12–78)
ANION GAP SERPL CALC-SCNC: 7 MMOL/L — SIGNIFICANT CHANGE UP (ref 5–17)
AST SERPL-CCNC: 22 U/L — SIGNIFICANT CHANGE UP (ref 15–37)
BILIRUB SERPL-MCNC: 0.3 MG/DL — SIGNIFICANT CHANGE UP (ref 0.2–1.2)
BUN SERPL-MCNC: 9 MG/DL — SIGNIFICANT CHANGE UP (ref 7–23)
CALCIUM SERPL-MCNC: 7.8 MG/DL — LOW (ref 8.5–10.1)
CHLORIDE SERPL-SCNC: 103 MMOL/L — SIGNIFICANT CHANGE UP (ref 96–108)
CO2 SERPL-SCNC: 28 MMOL/L — SIGNIFICANT CHANGE UP (ref 22–31)
CREAT SERPL-MCNC: 0.78 MG/DL — SIGNIFICANT CHANGE UP (ref 0.5–1.3)
GLUCOSE SERPL-MCNC: 135 MG/DL — HIGH (ref 70–99)
GRAM STN FLD: SIGNIFICANT CHANGE UP
HCT VFR BLD CALC: 28.5 % — LOW (ref 39–50)
HGB BLD-MCNC: 9.7 G/DL — LOW (ref 13–17)
MAGNESIUM SERPL-MCNC: 2.1 MG/DL — SIGNIFICANT CHANGE UP (ref 1.6–2.6)
MCHC RBC-ENTMCNC: 30.2 PG — SIGNIFICANT CHANGE UP (ref 27–34)
MCHC RBC-ENTMCNC: 34 GM/DL — SIGNIFICANT CHANGE UP (ref 32–36)
MCV RBC AUTO: 89 FL — SIGNIFICANT CHANGE UP (ref 80–100)
PHOSPHATE SERPL-MCNC: 3.1 MG/DL — SIGNIFICANT CHANGE UP (ref 2.5–4.5)
PLATELET # BLD AUTO: 416 K/UL — HIGH (ref 150–400)
POTASSIUM SERPL-MCNC: 4 MMOL/L — SIGNIFICANT CHANGE UP (ref 3.5–5.3)
POTASSIUM SERPL-SCNC: 4 MMOL/L — SIGNIFICANT CHANGE UP (ref 3.5–5.3)
PROT SERPL-MCNC: 6.2 GM/DL — SIGNIFICANT CHANGE UP (ref 6–8.3)
RBC # BLD: 3.2 M/UL — LOW (ref 4.2–5.8)
RBC # FLD: 12.9 % — SIGNIFICANT CHANGE UP (ref 10.3–14.5)
SODIUM SERPL-SCNC: 138 MMOL/L — SIGNIFICANT CHANGE UP (ref 135–145)
SPECIMEN SOURCE: SIGNIFICANT CHANGE UP
WBC # BLD: 11 K/UL — HIGH (ref 3.8–10.5)
WBC # FLD AUTO: 11 K/UL — HIGH (ref 3.8–10.5)

## 2017-06-07 RX ORDER — ELECTROLYTE SOLUTION,INJ
1 VIAL (ML) INTRAVENOUS
Qty: 0 | Refills: 0 | Status: DISCONTINUED | OUTPATIENT
Start: 2017-06-07 | End: 2017-06-07

## 2017-06-07 RX ORDER — FLUCONAZOLE 150 MG/1
100 TABLET ORAL DAILY
Qty: 0 | Refills: 0 | Status: DISCONTINUED | OUTPATIENT
Start: 2017-06-07 | End: 2017-06-16

## 2017-06-07 RX ORDER — PANTOPRAZOLE SODIUM 20 MG/1
40 TABLET, DELAYED RELEASE ORAL DAILY
Qty: 0 | Refills: 0 | Status: DISCONTINUED | OUTPATIENT
Start: 2017-06-07 | End: 2017-06-10

## 2017-06-07 RX ORDER — I.V. FAT EMULSION 20 G/100ML
50 EMULSION INTRAVENOUS
Qty: 0 | Refills: 0 | Status: DISCONTINUED | OUTPATIENT
Start: 2017-06-07 | End: 2017-06-07

## 2017-06-07 RX ADMIN — SODIUM CHLORIDE 75 MILLILITER(S): 9 INJECTION INTRAMUSCULAR; INTRAVENOUS; SUBCUTANEOUS at 04:54

## 2017-06-07 RX ADMIN — PANTOPRAZOLE SODIUM 40 MILLIGRAM(S): 20 TABLET, DELAYED RELEASE ORAL at 06:34

## 2017-06-07 RX ADMIN — MEROPENEM 200 MILLIGRAM(S): 1 INJECTION INTRAVENOUS at 06:35

## 2017-06-07 RX ADMIN — OCTREOTIDE ACETATE 100 MICROGRAM(S): 200 INJECTION, SOLUTION INTRAVENOUS; SUBCUTANEOUS at 15:58

## 2017-06-07 RX ADMIN — HEPARIN SODIUM 5000 UNIT(S): 5000 INJECTION INTRAVENOUS; SUBCUTANEOUS at 06:34

## 2017-06-07 RX ADMIN — OCTREOTIDE ACETATE 100 MICROGRAM(S): 200 INJECTION, SOLUTION INTRAVENOUS; SUBCUTANEOUS at 21:56

## 2017-06-07 RX ADMIN — OCTREOTIDE ACETATE 100 MICROGRAM(S): 200 INJECTION, SOLUTION INTRAVENOUS; SUBCUTANEOUS at 06:34

## 2017-06-07 RX ADMIN — PANTOPRAZOLE SODIUM 40 MILLIGRAM(S): 20 TABLET, DELAYED RELEASE ORAL at 14:10

## 2017-06-07 RX ADMIN — MEROPENEM 200 MILLIGRAM(S): 1 INJECTION INTRAVENOUS at 21:55

## 2017-06-07 RX ADMIN — Medication 1 EACH: at 22:51

## 2017-06-07 RX ADMIN — HEPARIN SODIUM 5000 UNIT(S): 5000 INJECTION INTRAVENOUS; SUBCUTANEOUS at 14:10

## 2017-06-07 RX ADMIN — MEROPENEM 200 MILLIGRAM(S): 1 INJECTION INTRAVENOUS at 14:12

## 2017-06-07 RX ADMIN — HEPARIN SODIUM 5000 UNIT(S): 5000 INJECTION INTRAVENOUS; SUBCUTANEOUS at 21:54

## 2017-06-07 RX ADMIN — I.V. FAT EMULSION 31.25 GRAM(S): 20 EMULSION INTRAVENOUS at 22:51

## 2017-06-07 RX ADMIN — FLUCONAZOLE 100 MILLIGRAM(S): 150 TABLET ORAL at 21:55

## 2017-06-07 NOTE — PROGRESS NOTE ADULT - ASSESSMENT
s/p hartmans procedure for perforated diverticulitis as well as ileocolic resection with anastomosis. Has anastomotic leak of ileocolic anastamosis but stable.    NPO with TPN  cont IR drain from yesterday, dc IR drain from last week done on 5/30  OOB  transfer to [facility] surg floor, cont IV abx  Cont octreotide

## 2017-06-07 NOTE — PROGRESS NOTE ADULT - SUBJECTIVE AND OBJECTIVE BOX
: no complaints      PHYSICAL EXAM:    Daily     Daily Weight in k.2 (2017 06:17)    ICU Vital Signs Last 24 Hrs  T(C): 37.1, Max: 37.5 ( @ 08:21)  T(F): 98.8, Max: 99.5 ( @ 08:21)  HR: 77 (64 - 77)  BP: 115/67 (98/63 - 126/84)  BP(mean): 75 (70 - 94)  ABP: --  ABP(mean): --  RR: 16 (12 - 22)  SpO2: 100% (94% - 100%)      Constitutional: Well appearing  HEENT: Atraumatic, MAHAMED, Normal, No congestion  Respiratory: Breath Sounds normal, no rhonchi/wheeze  Cardiovascular: N S1S2; MASOOD present  Gastrointestinal: Abdomen soft, non tender, Bowel Sounds present; colostomy bag present  Extremities: No edema, peripheral pulses present  Neurological: AAO x 3, no gross focal motor deficits  Skin: Non cellulitic, no rash, ulcers  Lymph Nodes: No lymphadenopathy noted  Back: No CVA tenderness   Musculoskeletal: non tender  Breasts: Deferred  Genitourinary: deferred  Rectal: Deferred                          9.7    11.0  )-----------( 416      ( 2017 05:33 )             28.5       CBC Full  -  ( 2017 05:33 )  WBC Count : 11.0 K/uL  Hemoglobin : 9.7 g/dL  Hematocrit : 28.5 %  Platelet Count - Automated : 416 K/uL  Mean Cell Volume : 89.0 fl  Mean Cell Hemoglobin : 30.2 pg  Mean Cell Hemoglobin Concentration : 34.0 gm/dL  Auto Neutrophil # : x  Auto Lymphocyte # : x  Auto Monocyte # : x  Auto Eosinophil # : x  Auto Basophil # : x  Auto Neutrophil % : x  Auto Lymphocyte % : x  Auto Monocyte % : x  Auto Eosinophil % : x  Auto Basophil % : x          138  |  103  |  9   ----------------------------<  135<H>  4.0   |  28  |  0.78    Ca    7.8<L>      2017 05:33  Phos  3.1     06-07  Mg     2.1     06-07    TPro  6.2  /  Alb  1.3<L>  /  TBili  0.3  /  DBili  x   /  AST  22  /  ALT  24  /  AlkPhos  101  06-07      LIVER FUNCTIONS - ( 2017 05:33 )  Alb: 1.3 g/dL / Pro: 6.2 gm/dL / ALK PHOS: 101 U/L / ALT: 24 U/L / AST: 22 U/L / GGT: x                               MEDICATIONS  (STANDING):  heparin  Injectable 5000Unit(s) SubCutaneous every 8 hours  ALBUTerol/ipratropium for Nebulization 3milliLiter(s) Nebulizer every 6 hours  octreotide  Injectable 100MICROGram(s) SubCutaneous every 8 hours  Parenteral Nutrition - Adult 1Each TPN Continuous <Continuous>  meropenem IVPB 1000milliGRAM(s) IV Intermittent every 8 hours  Parenteral Nutrition - Adult 1Each TPN Continuous <Continuous>  fat emulsion 20% Infusion 50Gram(s) IV Continuous <Continuous>  pantoprazole   Suspension 40milliGRAM(s) Oral daily  fluconAZOLE   Tablet 100milliGRAM(s) Oral daily

## 2017-06-07 NOTE — PROGRESS NOTE ADULT - ASSESSMENT
47M admitted for a 24hr h/o progressive abdominal pain that began after a 1-2 day trip to [facility]. CT with acute perforated sigmoid diverticulitis, s/p LAP with resection (Hartmanns/ileocolic resection) complicated by septic shock secondary to the above presently off pressor support. Rec'd large of INF for post fluid resuscitation now with anasarca    Problem/Plan - 1:  ·  Problem: s/p hartmans procedure for perforated diverticulitis as well as ileocolic resection with anastomosis.  strted om meropenem as per ID   NPO, bowel rest and placement of PICC line for TPN.  CCT-> perihepatic ascites/fluid collection  s/p IR/perc draninage for dx and Tx purposes: await cultures: ngtd  Has rec's 14 days of abx.   Diflucan for yeast in culture    Problem: Anasarca.  Plan: resolved  stop diuretics  Ambulate  [place] - 3:  Problem: HARDIK (acute kidney injury).  Plan: 2/2 to ATN from septic shock-> both resolved.

## 2017-06-07 NOTE — PROGRESS NOTE ADULT - SUBJECTIVE AND OBJECTIVE BOX
Subjective:    pat on PPN, two intra-abdomen drain.    MEDICATIONS  (STANDING):  heparin  Injectable 5000Unit(s) SubCutaneous every 8 hours  ALBUTerol/ipratropium for Nebulization 3milliLiter(s) Nebulizer every 6 hours  octreotide  Injectable 100MICROGram(s) SubCutaneous every 8 hours  Parenteral Nutrition - Adult 1Each TPN Continuous <Continuous>  meropenem IVPB 1000milliGRAM(s) IV Intermittent every 8 hours  Parenteral Nutrition - Adult 1Each TPN Continuous <Continuous>  fat emulsion 20% Infusion 50Gram(s) IV Continuous <Continuous>  pantoprazole   Suspension 40milliGRAM(s) Oral daily    MEDICATIONS  (PRN):  naloxone Injectable 0.1milliGRAM(s) IV Push every 3 minutes PRN For ANY of the following changes in patient status:  A. RR LESS THAN 10 breaths per minute, B. Oxygen saturation LESS THAN 90%, C. Sedation score of 6  ondansetron Injectable 4milliGRAM(s) IV Push every 6 hours PRN Nausea  melatonin Oral Tab/Cap - Peds 3milliGRAM(s) Oral at bedtime PRN Insomnia  morphine  - Injectable 2milliGRAM(s) IV Push every 4 hours PRN Moderate Pain (4 - 6)  oxyCODONE  5 mG/acetaminophen 325 mG 1Tablet(s) Oral every 4 hours PRN Mild Pain (1 - 3)  oxyCODONE  5 mG/acetaminophen 325 mG 2Tablet(s) Oral every 4 hours PRN Moderate Pain (4 - 6)      Allergies    No Known Allergies    Intolerances        Vital Signs Last 24 Hrs  T(C): 37.5, Max: 38 (06-06 @ 15:14)  T(F): 99.5, Max: 100.4 (06-06 @ 15:14)  HR: 68 (64 - 71)  BP: 113/69 (98/63 - 126/84)  BP(mean): 79 (70 - 94)  RR: 19 (12 - 22)  SpO2: 96% (94% - 100%)    PHYSICAL EXAMINATION:    NECK:  Supple. No lymphadenopathy. Jugular venous pressure not elevated. Carotids equal.   HEART:   The cardiac impulse has a normal quality. Reg., Nl S1 and S2.  There are no murmurs, rubs or gallops noted  CHEST:  Chest is clear to auscultation. Normal respiratory effort.  ABDOMEN:  Soft and nontender.   EXTREMITIES:  There is no edema.       LABS:                        9.7    11.0  )-----------( 416      ( 07 Jun 2017 05:33 )             28.5     06-07    138  |  103  |  9   ----------------------------<  135<H>  4.0   |  28  |  0.78    Ca    7.8<L>      07 Jun 2017 05:33  Phos  3.1     06-07  Mg     2.1     06-07    TPro  6.2  /  Alb  1.3<L>  /  TBili  0.3  /  DBili  x   /  AST  22  /  ALT  24  /  AlkPhos  101  06-07

## 2017-06-07 NOTE — PROGRESS NOTE ADULT - ASSESSMENT
47M with no significant past medical history admitted on 5/19 for evaluation of lower abdominal pain, decreased appetite and upon admission was found to have sigmoid diverticulitis; patient was medically managed however, on 5/21 patient underwent sigmoid resection, ileocolostomy and currently is post op asking for ice chips. He has decreased urine output and worsening renal function as well as hypotension  1. diverticulitis s/p sigmoid resection/ileostomy c/b sepsis & post-op collection s/p drainage now with persistent peritonitis/abd collection/anastomotic leak  - had IR drainage of fluid collection  -day #2 meroepnem  - will add diflucan given yeast in culture  - follow up cultures   -drain in place  Will follow                  - afebrile currently and hd stable  - start IV meropenem after IR drainage 1gmq8h for gram (-) resistant/anaroebic coverage for gi tract  -previously completed course of vancomycin/diflucan/zosyn, antibiotics discontinued on 6/1  - tolerating antibiotics without rashes or side effects   - iv hydration and supportive care

## 2017-06-07 NOTE — PROGRESS NOTE ADULT - SUBJECTIVE AND OBJECTIVE BOX
HPI:  47M with no significant past medical history admitted on 5/19 for evaluation of lower abdominal pain, decreased appetite and upon admission was found to have sigmoid diverticulitis; patient was medically managed however, on 5/21 patient underwent sigmoid resection, ileocolostomy and currently is post op asking for ice chips. He has decreased urine output and worsening renal function as well as hypotension.  hospital course c/b septic shock/renal failure post-operatively, requiring pressor support/icu admission, found to have perihepatic collection s/p drainage on 5/30, noted to be clinically improving, but with low grade temps on 6/4  underwent repeat CT abd/pelvis 6/5 showing anastomotic leak/persistent peritonitis with new R abd collection  plan for IR drainage of collection 6/6 and PICC line for TPN   Today 6/7 patient comfortable, had dual lumen picc line placed, undergoing TPN      MEDICATIONS  (STANDING):  heparin  Injectable 5000Unit(s) SubCutaneous every 8 hours  ALBUTerol/ipratropium for Nebulization 3milliLiter(s) Nebulizer every 6 hours  octreotide  Injectable 100MICROGram(s) SubCutaneous every 8 hours  Parenteral Nutrition - Adult 1Each TPN Continuous <Continuous>  meropenem IVPB 1000milliGRAM(s) IV Intermittent every 8 hours  Parenteral Nutrition - Adult 1Each TPN Continuous <Continuous>  fat emulsion 20% Infusion 50Gram(s) IV Continuous <Continuous>  pantoprazole   Suspension 40milliGRAM(s) Oral daily  fluconAZOLE   Tablet 100milliGRAM(s) Oral daily    MEDICATIONS  (PRN):  naloxone Injectable 0.1milliGRAM(s) IV Push every 3 minutes PRN For ANY of the following changes in patient status:  A. RR LESS THAN 10 breaths per minute, B. Oxygen saturation LESS THAN 90%, C. Sedation score of 6  ondansetron Injectable 4milliGRAM(s) IV Push every 6 hours PRN Nausea  melatonin Oral Tab/Cap - Peds 3milliGRAM(s) Oral at bedtime PRN Insomnia  morphine  - Injectable 2milliGRAM(s) IV Push every 4 hours PRN Moderate Pain (4 - 6)  oxyCODONE  5 mG/acetaminophen 325 mG 1Tablet(s) Oral every 4 hours PRN Mild Pain (1 - 3)  oxyCODONE  5 mG/acetaminophen 325 mG 2Tablet(s) Oral every 4 hours PRN Moderate Pain (4 - 6)      Vital Signs Last 24 Hrs  T(C): 37.5, Max: 38 (06-06 @ 15:14)  T(F): 99.5, Max: 100.4 (06-06 @ 15:14)  HR: 68 (64 - 71)  BP: 113/69 (98/63 - 126/84)  BP(mean): 79 (70 - 94)  RR: 19 (12 - 22)  SpO2: 96% (94% - 100%)    Physical Exam:          Physical Exam:  Constitutional: well developed  HEENT: NC/AT, EOMI, PERRLA  Neck: supple  Respiratory: clear  Cardiovascular: S1S2 regular, no murmurs  Abdomen: kimmy drain in place, dressing in place left sided ostomy  Genitourinary: deferred  Rectal: deferred  Musculoskeletal: no muscle tenderness, no joint swelling or tenderness  Neurological: AxOx3, moving all extremities, no focal deficits  Skin: no rashes    Labs:                                   10.8   13.5  )-----------( 520      ( 06 Jun 2017 06:47 )             32.0     06-06    136  |  101  |  8   ----------------------------<  102<H>  4.5   |  29  |  0.90    Ca    8.3<L>      06 Jun 2017 06:47  Phos  3.9     06-06  Mg     2.3     06-06      Culture - Body Fluid with Gram Stain (06.06.17 @ 13:50)    Gram Stain:   Few polymorphonuclear leukocytes per low power field  Moderate Gram Negative Rods per oil power field  Moderate Gram Positive Rods per oil power field  Few Gram Positive Cocci in Pairs and Chains per oil power field  Rare Yeast like cells per oil power field    Specimen Source: .Body Fluid Diverticular collection drainage            Cultures: Culture - Blood (05.23.17 @ 11:20)    Gram Stain:   Growth in anaerobic bottle: Gram Positive Cocci in Clusters    Specimen Source: .Blood Blood    Culture Results:   Growth in anaerobic bottle: Coag Negative Staphylococcus  Single set isolate, possible contaminant. Contact  Microbiology if susceptibility testing clinically  indicated.    Culture - Body Fluid with Gram Stain (05.30.17 @ 16:30)    Gram Stain:   polymorphonuclear leukocytes seen  No organisms seen  by cytocentrifuge    Specimen Source: Abdominal Fl None    Culture Results:   No growth to date            Radiology:  EXAM:  CT ABDOMEN AND PELVIS OC IC                            PROCEDURE DATE:  06/05/2017        INTERPRETATION:  CT ABDOMEN AND PELVIS OC IC    HISTORY:  perforated diverticulitis s/p Hartmans,    Technique: CT of the abdomen and pelvis is performed with oral and   intravenous contrast. Axial images are supplemented with coronal and   sagittal reformations. This study was performed using automatic exposure   control (radiation dose reduction software) to obtain a diagnostic image   quality scan with patient dose as low as reasonably achievable.    Contrast: 90 cc Omnipaque 350    Comparison: CT abdomen and pelvis 5/29/2017    Findings:  LIVER: Normal.  SPLEEN: Normal.  PANCREAS: Normal.  GALLBLADDER/BILIARY TREE: Nondilated. Normal gallbladder.  ADRENALS: Stable right adrenal hemorrhage.  KIDNEYS: No calcification, hydronephrosis, or renal mass.  LYMPHADENOPATHY/RETROPERITONEUM: Prominent upper abdominal lymph nodes.  VASCULATURE: Normal caliber aorta.    BOWEL: Stable postsurgical anatomy after [employer] and ileocolic   anastomosis. Normal appearance of the rectal stump with an adjacent drain   in place. Unremarkable left lower quadrant colostomy. Oral contrast   material reaches the colostomy. No bowel obstruction.    PELVIC VISCERA: Unremarkable prostate, seminal vesicles, and urinary   bladder.  PELVIC LYMPH NODES: No pelvic adenopathy.    PERITONEUM/ABDOMINAL WALL: Persistent small pneumoperitoneum with new   droplets of gas on the left. Right perihepatic pigtail catheter is in   place. Increased thickening of the perihepatic and Morison's pouch   peritoneal lining indicative of peritonitis. Stable small perihepatic   fluid. Slightly increased pelvic free fluid.     There is interval development of an air, fluid, and oral contrast   collection at the level of the gallbladder fossa measuring 8.4 x 5.0 cm   (series 2 image 47). The oral contrast material is seen tracking from the   superior aspect of the ileocolic anastomosis.    SKELETAL: No acute bony abnormality.  LUNG BASES: Stable small right pleural effusion with right base   atelectasis. Decreased trace left pleural effusion.    IMPRESSION:     Definitive evidence of an anastomotic leak at the superior aspect of the   ileocolic anastomosis with air, fluid,and oral contrast material   tracking into the gallbladder fossa with the collection measuring 8.4 x   5.0 cm. Persistent pneumoperitoneum and free fluid. Right upper quadrant   drain in place. Thickening of the right-sided peritoneal lining   indicative of peritonitis.    These results were communicated to Dr. Boss by me over telephone at 1:45   PM on 6/5/2017.      EXAM:  CT ABDOMEN AND PELVIS IC                            PROCEDURE DATE:  05/19/2017        INTERPRETATION:  CLINICAL INFORMATION: 47-year-old man with abdominal   pain assess for appendicitis     TECHNIQUE:    CT of abdomen and pelvis was performed with axial images   were obtained from the diaphragm to pubic symphysis oral and IV contrast.    COMPARISON:  None.    FINDINGS:    Liver: Borderline hepatomegaly with mild fatty infiltration otherwise   within normal limits  Gallbladder: Within normal limits  Bile ducts: No intrahepatic or extrahepatic biliary dilatation  Pancreas: within normal limits    Spleen: within normal limits  Adrenal: within normal limits  Kidneys, Ureters and Bladder: Symmetric enhancement bilaterally. No   perinephric attending or collections. No hydronephrosis. No intrarenal   calculi. Normal caliber of the ureters. Limited unopacified nondistended   bladder.    Pelvis: No pelvic adenopathy or pelvic free fluid.  Small fat-containing   bilateral inguinal hernias.      Bowel: No bowel obstruction.  There are few scattered colonic   diverticula. There is focal thickening of sigmoid colon in the pelvis   associated with mesenteric fat stranding and thickening of adjacent   fascial planes with localized perforation and small air bubbles without   abscess. Findings are consistent with acute rupture diverticulitis. Small   free fluid adjacent to gas filled appendix.    Peritoneum: Small ascites, no organized fluid collections.    Retroperitoneum: within normal limits  Vessels: Patent.    Abdominal wall: Small fat-containing umbilical hernia.    Lower chest and lung Bases: The visualized lung bases clear except for   subsegmental dependent atelectasis. Heart normal in size.   Bones: Degenerative changes.     IMPRESSION:     Focal thickening of sigmoid colon with mesenteric fat stranding and   thickening of fascial planes with scattered adjacent air bubbles   consistent with acute diverticulitis without abscess. Small free fluid in   the abdomen extending to the right quadrant.    EXAM:  CT ABDOMEN AND PELVIS OC IC                            PROCEDURE DATE:  05/29/2017        INTERPRETATION:  CT OF THE ABDOMEN AND PELVIS WITH IV CONTRAST     CLINICAL INDICATION: diverticulitis, sepsis s/p hartmans    TECHNIQUE: CT scan of the abdomen and pelvis was performed from the domes   of the diaphragm to the symphysis pubis with oral and intravenous   contrast.  Intravenous administration of 90 cc of Omnipaque-350, 10 cc   discarded, was without complication.  Sagittal and coronalreformatted   images were provided.    COMPARISON: CT abdomen pelvis May 19, 2017    FINDINGS:   LOWER THORAX: Moderate bilateral pleural effusions with underlying   compressive atelectasis..    LIVER:  Moderate perihepatic ascites and adjacent free air..  GALLBLADDER: Unremarkable.  BILIARY TREE: Unremarkable.  PANCREAS: Unremarkable.  SPLEEN: Unremarkable.  ADRENALS: There is new expansion of the right adrenal gland with   nonsimple fluid/material, measuring approximately 4.0 x 2.6 cm, may   reflect hemorrhage within the right adrenal gland. Left adrenal gland   appears unremarkable.    KIDNEYS/URETERS: Unremarkable.    BOWEL: Status post sigmoid resection with left lower quadrant colostomy   present and suture material at the rectal stump.Additionally, partial   resection and anastomosis in the region of the cecum is present. There is   peripheral luminal air within the ascending bowel wall and cecum. There   is a surgical drainage catheter with tip in the mid pelvis. No focal   fluidcollection or abscess.    PERITONEUM/RETROPERITONEUM: Extensive free air in the upper abdomen,   compatible with recent surgery. Moderate perihepatic ascites and minimal   fluid in the bilateral paracolic gutters is present.    BLADDER: Small amount of air in the bladder, likely reflecting recent   Cooley catheterization.  REPRODUCTIVE ORGANS: Unremarkable.    VASCULATURE: Within normal limits.  ABDOMINAL WALL: Postsurgical changes within the abdominal wall are   present. Diffuse edema of the abdominal wall. Midline surgical staples   present.    BONES: No aggressive osseous lesion.    IMPRESSION:    Status post sigmoid resection with left lower quadrant colostomy present   and suture material at the rectal stump. Additionally, partial resection   and anastomosis in the region of the cecum is present. There is   peripheral luminal air within the ascending bowel wall and cecum. There   is a surgical drainage catheter with tip in the mid pelvis. No focal   fluid collection or abscess. Moderate perihepatic ascites and minimal   fluid in the bilateral paracolic gutters is present.    New expansion of the right adrenal gland with hyperattenuating material,   measuring approximately 4.0 x 2.6 cm, may reflect hemorrhage within the   right adrenal gland.     Moderate bilateral pleural effusions with underlying compressive   atelectasis..    Rest of the findings as above.    Advanced directive addressed: full resuscitation

## 2017-06-07 NOTE — PROGRESS NOTE ADULT - SUBJECTIVE AND OBJECTIVE BOX
POD 17  no major c/o..S/P IR drain placed yesterday     Vital Signs Last 24 Hrs  T(C): 37.5, Max: 38 (06-06 @ 15:14)  T(F): 99.5, Max: 100.4 (06-06 @ 15:14)  HR: 65 (64 - 71)  BP: 115/66 (98/63 - 126/84)  BP(mean): 74 (70 - 94)  RR: 16 (12 - 22)  SpO2: 96% (94% - 100%)    NAD  abd soft, midline incision clean, small wound in between staples clean and gauze replaced  ostomy with some output  2 RUQ drains  minimal output                          9.7    11.0  )-----------( 416      ( 07 Jun 2017 05:33 )             28.5     06-07    138  |  103  |  9   ----------------------------<  135<H>  4.0   |  28  |  0.78    Ca    7.8<L>      07 Jun 2017 05:33  Phos  3.1     06-07  Mg     2.1     06-07    TPro  6.2  /  Alb  1.3<L>  /  TBili  0.3  /  DBili  x   /  AST  22  /  ALT  24  /  AlkPhos  101  06-07

## 2017-06-08 LAB
-  AMIKACIN: SIGNIFICANT CHANGE UP
-  AMPICILLIN/SULBACTAM: SIGNIFICANT CHANGE UP
-  AMPICILLIN: SIGNIFICANT CHANGE UP
-  AMPICILLIN: SIGNIFICANT CHANGE UP
-  AZTREONAM: SIGNIFICANT CHANGE UP
-  CEFAZOLIN: SIGNIFICANT CHANGE UP
-  CEFEPIME: SIGNIFICANT CHANGE UP
-  CEFOXITIN: SIGNIFICANT CHANGE UP
-  CEFTAZIDIME: SIGNIFICANT CHANGE UP
-  CEFTRIAXONE: SIGNIFICANT CHANGE UP
-  CIPROFLOXACIN: SIGNIFICANT CHANGE UP
-  CIPROFLOXACIN: SIGNIFICANT CHANGE UP
-  ERTAPENEM: SIGNIFICANT CHANGE UP
-  ERYTHROMYCIN: SIGNIFICANT CHANGE UP
-  GENTAMICIN: SIGNIFICANT CHANGE UP
-  IMIPENEM: SIGNIFICANT CHANGE UP
-  LEVOFLOXACIN: SIGNIFICANT CHANGE UP
-  MEROPENEM: SIGNIFICANT CHANGE UP
-  PIPERACILLIN/TAZOBACTAM: SIGNIFICANT CHANGE UP
-  TETRACYCLINE: SIGNIFICANT CHANGE UP
-  TOBRAMYCIN: SIGNIFICANT CHANGE UP
-  TRIMETHOPRIM/SULFAMETHOXAZOLE: SIGNIFICANT CHANGE UP
-  VANCOMYCIN: SIGNIFICANT CHANGE UP
ALBUMIN SERPL ELPH-MCNC: 1.5 G/DL — LOW (ref 3.3–5)
ALP SERPL-CCNC: 100 U/L — SIGNIFICANT CHANGE UP (ref 40–120)
ALT FLD-CCNC: 24 U/L — SIGNIFICANT CHANGE UP (ref 12–78)
ANION GAP SERPL CALC-SCNC: 7 MMOL/L — SIGNIFICANT CHANGE UP (ref 5–17)
AST SERPL-CCNC: 22 U/L — SIGNIFICANT CHANGE UP (ref 15–37)
BILIRUB SERPL-MCNC: 0.3 MG/DL — SIGNIFICANT CHANGE UP (ref 0.2–1.2)
BUN SERPL-MCNC: 9 MG/DL — SIGNIFICANT CHANGE UP (ref 7–23)
C DIFF BY PCR RESULT: SIGNIFICANT CHANGE UP
C DIFF TOX GENS STL QL NAA+PROBE: SIGNIFICANT CHANGE UP
CALCIUM SERPL-MCNC: 7.9 MG/DL — LOW (ref 8.5–10.1)
CHLORIDE SERPL-SCNC: 103 MMOL/L — SIGNIFICANT CHANGE UP (ref 96–108)
CO2 SERPL-SCNC: 29 MMOL/L — SIGNIFICANT CHANGE UP (ref 22–31)
CREAT SERPL-MCNC: 0.86 MG/DL — SIGNIFICANT CHANGE UP (ref 0.5–1.3)
GLUCOSE SERPL-MCNC: 116 MG/DL — HIGH (ref 70–99)
HCT VFR BLD CALC: 29.4 % — LOW (ref 39–50)
HGB BLD-MCNC: 9.8 G/DL — LOW (ref 13–17)
MAGNESIUM SERPL-MCNC: 2.2 MG/DL — SIGNIFICANT CHANGE UP (ref 1.6–2.6)
MCHC RBC-ENTMCNC: 30 PG — SIGNIFICANT CHANGE UP (ref 27–34)
MCHC RBC-ENTMCNC: 33.5 GM/DL — SIGNIFICANT CHANGE UP (ref 32–36)
MCV RBC AUTO: 89.4 FL — SIGNIFICANT CHANGE UP (ref 80–100)
METHOD TYPE: SIGNIFICANT CHANGE UP
METHOD TYPE: SIGNIFICANT CHANGE UP
PHOSPHATE SERPL-MCNC: 3.3 MG/DL — SIGNIFICANT CHANGE UP (ref 2.5–4.5)
PLATELET # BLD AUTO: 431 K/UL — HIGH (ref 150–400)
POTASSIUM SERPL-MCNC: 4 MMOL/L — SIGNIFICANT CHANGE UP (ref 3.5–5.3)
POTASSIUM SERPL-SCNC: 4 MMOL/L — SIGNIFICANT CHANGE UP (ref 3.5–5.3)
PROT SERPL-MCNC: 6.8 GM/DL — SIGNIFICANT CHANGE UP (ref 6–8.3)
RBC # BLD: 3.28 M/UL — LOW (ref 4.2–5.8)
RBC # FLD: 13.2 % — SIGNIFICANT CHANGE UP (ref 10.3–14.5)
SODIUM SERPL-SCNC: 139 MMOL/L — SIGNIFICANT CHANGE UP (ref 135–145)
WBC # BLD: 9.7 K/UL — SIGNIFICANT CHANGE UP (ref 3.8–10.5)
WBC # FLD AUTO: 9.7 K/UL — SIGNIFICANT CHANGE UP (ref 3.8–10.5)

## 2017-06-08 RX ORDER — I.V. FAT EMULSION 20 G/100ML
250 EMULSION INTRAVENOUS ONCE
Qty: 0 | Refills: 0 | Status: COMPLETED | OUTPATIENT
Start: 2017-06-08 | End: 2017-06-08

## 2017-06-08 RX ORDER — ELECTROLYTE SOLUTION,INJ
1 VIAL (ML) INTRAVENOUS
Qty: 0 | Refills: 0 | Status: DISCONTINUED | OUTPATIENT
Start: 2017-06-08 | End: 2017-06-08

## 2017-06-08 RX ORDER — I.V. FAT EMULSION 20 G/100ML
50 EMULSION INTRAVENOUS
Qty: 0 | Refills: 0 | Status: DISCONTINUED | OUTPATIENT
Start: 2017-06-08 | End: 2017-06-08

## 2017-06-08 RX ADMIN — PANTOPRAZOLE SODIUM 40 MILLIGRAM(S): 20 TABLET, DELAYED RELEASE ORAL at 11:49

## 2017-06-08 RX ADMIN — HEPARIN SODIUM 5000 UNIT(S): 5000 INJECTION INTRAVENOUS; SUBCUTANEOUS at 06:03

## 2017-06-08 RX ADMIN — OCTREOTIDE ACETATE 100 MICROGRAM(S): 200 INJECTION, SOLUTION INTRAVENOUS; SUBCUTANEOUS at 21:43

## 2017-06-08 RX ADMIN — MEROPENEM 200 MILLIGRAM(S): 1 INJECTION INTRAVENOUS at 06:04

## 2017-06-08 RX ADMIN — OCTREOTIDE ACETATE 100 MICROGRAM(S): 200 INJECTION, SOLUTION INTRAVENOUS; SUBCUTANEOUS at 15:57

## 2017-06-08 RX ADMIN — FLUCONAZOLE 100 MILLIGRAM(S): 150 TABLET ORAL at 11:49

## 2017-06-08 RX ADMIN — I.V. FAT EMULSION 31.25 MILLILITER(S): 20 EMULSION INTRAVENOUS at 21:22

## 2017-06-08 RX ADMIN — HEPARIN SODIUM 5000 UNIT(S): 5000 INJECTION INTRAVENOUS; SUBCUTANEOUS at 14:41

## 2017-06-08 RX ADMIN — MEROPENEM 200 MILLIGRAM(S): 1 INJECTION INTRAVENOUS at 14:41

## 2017-06-08 RX ADMIN — OCTREOTIDE ACETATE 100 MICROGRAM(S): 200 INJECTION, SOLUTION INTRAVENOUS; SUBCUTANEOUS at 06:04

## 2017-06-08 RX ADMIN — MEROPENEM 200 MILLIGRAM(S): 1 INJECTION INTRAVENOUS at 21:26

## 2017-06-08 RX ADMIN — Medication 1 EACH: at 21:21

## 2017-06-08 RX ADMIN — HEPARIN SODIUM 5000 UNIT(S): 5000 INJECTION INTRAVENOUS; SUBCUTANEOUS at 21:26

## 2017-06-08 NOTE — PROGRESS NOTE ADULT - SUBJECTIVE AND OBJECTIVE BOX
6/7: no complaints    6/8:  feeling better, no complaints      PHYSICAL EXAM:    Daily     Daily     ICU Vital Signs Last 24 Hrs  T(C): 37, Max: 37 (06-07 @ 17:37)  T(F): 98.6, Max: 98.6 (06-07 @ 17:37)  HR: 105 (66 - 105)  BP: 106/66 (101/62 - 113/61)  BP(mean): --  ABP: --  ABP(mean): --  RR: 18 (16 - 18)  SpO2: 97% (96% - 99%)      Constitutional: Well appearing  HEENT: Atraumatic, MAHAMED, Normal, No congestion  Respiratory: Breath Sounds normal, no rhonchi/wheeze  Cardiovascular: N S1S2; MASOOD present  Gastrointestinal: Abdomen soft, non tender, Bowel Sounds present; colostomy bag present, kimmy drain present  Extremities: No edema, peripheral pulses present  Neurological: AAO x 3, no gross focal motor deficits  Skin: Non cellulitic, no rash, ulcers  Lymph Nodes: No lymphadenopathy noted  Back: No CVA tenderness   Musculoskeletal: non tender  Breasts: Deferred  Genitourinary: deferred  Rectal: Deferred                          9.8    9.7   )-----------( 431      ( 08 Jun 2017 06:49 )             29.4       CBC Full  -  ( 08 Jun 2017 06:49 )  WBC Count : 9.7 K/uL  Hemoglobin : 9.8 g/dL  Hematocrit : 29.4 %  Platelet Count - Automated : 431 K/uL  Mean Cell Volume : 89.4 fl  Mean Cell Hemoglobin : 30.0 pg  Mean Cell Hemoglobin Concentration : 33.5 gm/dL  Auto Neutrophil # : x  Auto Lymphocyte # : x  Auto Monocyte # : x  Auto Eosinophil # : x  Auto Basophil # : x  Auto Neutrophil % : x  Auto Lymphocyte % : x  Auto Monocyte % : x  Auto Eosinophil % : x  Auto Basophil % : x      06-08    139  |  103  |  9   ----------------------------<  116<H>  4.0   |  29  |  0.86    Ca    7.9<L>      08 Jun 2017 06:49  Phos  3.3     06-08  Mg     2.2     06-08    TPro  6.8  /  Alb  1.5<L>  /  TBili  0.3  /  DBili  x   /  AST  22  /  ALT  24  /  AlkPhos  100  06-08      LIVER FUNCTIONS - ( 08 Jun 2017 06:49 )  Alb: 1.5 g/dL / Pro: 6.8 gm/dL / ALK PHOS: 100 U/L / ALT: 24 U/L / AST: 22 U/L / GGT: x                               MEDICATIONS  (STANDING):  heparin  Injectable 5000Unit(s) SubCutaneous every 8 hours  ALBUTerol/ipratropium for Nebulization 3milliLiter(s) Nebulizer every 6 hours  octreotide  Injectable 100MICROGram(s) SubCutaneous every 8 hours  meropenem IVPB 1000milliGRAM(s) IV Intermittent every 8 hours  Parenteral Nutrition - Adult 1Each TPN Continuous <Continuous>  pantoprazole   Suspension 40milliGRAM(s) Oral daily  fluconAZOLE   Tablet 100milliGRAM(s) Oral daily  fat emulsion 20% IVPB 250milliLiter(s) IV Intermittent once  Parenteral Nutrition - Adult 1Each TPN Continuous <Continuous>

## 2017-06-08 NOTE — PROGRESS NOTE ADULT - ASSESSMENT
47M with no significant past medical history admitted on 5/19 for evaluation of lower abdominal pain, decreased appetite and upon admission was found to have sigmoid diverticulitis; patient was medically managed however, on 5/21 patient underwent sigmoid resection, ileocolostomy and currently is post op asking for ice chips. He has decreased urine output and worsening renal function as well as hypotension  1. diverticulitis s/p sigmoid resection/ileostomy c/b sepsis & post-op collection s/p drainage now with persistent peritonitis/abd collection/anastomotic leak  - had IR drainage of fluid collection  -day #3 meroepnem  -day #2 diflucan given yeast in culture  - follow up cultures   -drain in place  Will follow                  - afebrile currently and hd stable  - start IV meropenem after IR drainage 1gmq8h for gram (-) resistant/anaroebic coverage for gi tract  -previously completed course of vancomycin/diflucan/zosyn, antibiotics discontinued on 6/1  - tolerating antibiotics without rashes or side effects   - iv hydration and supportive care

## 2017-06-08 NOTE — PROGRESS NOTE ADULT - ASSESSMENT
47M admitted for a 24hr h/o progressive abdominal pain that began after a 1-2 day trip to [facility]. CT with acute perforated sigmoid diverticulitis, s/p LAP with resection (Hartmanns/ileocolic resection) complicated by septic shock secondary to the above presently off pressor support. Rec'd large of INF for post fluid resuscitation now with anasarca      1)   Problem: s/p hartmans procedure for perforated diverticulitis as well as ileocolic resection with anastomosis and controlled anastomotic leak:  started on meropenem day 3 and diflucan day 2 as per ID   NPO, bowel rest,  TPN.  CCT-> perihepatic ascites/fluid collection; repeat CT next week  Has rec's 14 days of abx.   Diflucan for yeast in culture    Problem: Anasarca.  Plan: resolved  stop diuretics  Ambulate    [place] - 3:  Problem: HARDIK (acute kidney injury).  Plan: 2/2 to AT from septic shock-> both resolved.     poc discussed with pt, team

## 2017-06-08 NOTE — PROGRESS NOTE ADULT - SUBJECTIVE AND OBJECTIVE BOX
No c/o.     MEDICATIONS  (STANDING):  heparin  Injectable 5000Unit(s) SubCutaneous every 8 hours  ALBUTerol/ipratropium for Nebulization 3milliLiter(s) Nebulizer every 6 hours  octreotide  Injectable 100MICROGram(s) SubCutaneous every 8 hours  meropenem IVPB 1000milliGRAM(s) IV Intermittent every 8 hours  Parenteral Nutrition - Adult 1Each TPN Continuous <Continuous>  pantoprazole   Suspension 40milliGRAM(s) Oral daily  fluconAZOLE   Tablet 100milliGRAM(s) Oral daily    MEDICATIONS  (PRN):  naloxone Injectable 0.1milliGRAM(s) IV Push every 3 minutes PRN For ANY of the following changes in patient status:  A. RR LESS THAN 10 breaths per minute, B. Oxygen saturation LESS THAN 90%, C. Sedation score of 6  ondansetron Injectable 4milliGRAM(s) IV Push every 6 hours PRN Nausea  melatonin Oral Tab/Cap - Peds 3milliGRAM(s) Oral at bedtime PRN Insomnia  morphine  - Injectable 2milliGRAM(s) IV Push every 4 hours PRN Moderate Pain (4 - 6)  oxyCODONE  5 mG/acetaminophen 325 mG 1Tablet(s) Oral every 4 hours PRN Mild Pain (1 - 3)  oxyCODONE  5 mG/acetaminophen 325 mG 2Tablet(s) Oral every 4 hours PRN Moderate Pain (4 - 6)    Vital Signs Last 24 Hrs  T(C): 36.5, Max: 37.5 (06-07 @ 08:21)  T(F): 97.7, Max: 99.5 (06-07 @ 08:21)  HR: 94 (65 - 94)  BP: 101/62 (101/62 - 115/67)  BP(mean): 75 (75 - 79)  RR: 16 (16 - 19)  SpO2: 97% (96% - 100%)  I&O's Detail    I & Os for current day (as of 08 Jun 2017 08:17)  =============================================  IN:    TPN (Total Parenteral Nutrition): 2723 ml    IV PiggyBack: 300 ml    Fat Emulsion 20%: 250 ml    Oral Fluid: 15 ml    Total IN: 3288 ml  ---------------------------------------------  OUT:    Voided: 3375 ml    Colostomy: 450 ml    T-Tube: 140 ml thin feculent     Total OUT: 3965 ml  ---------------------------------------------  Total NET: -677 ml    ABD exam :incision CDI, colostomy functional, soft, NTND                        9.8    9.7   )-----------( 431      ( 08 Jun 2017 06:49 )             29.4     06-08    139  |  103  |  9   ----------------------------<  116<H>  4.0   |  29  |  0.86    Ca    7.9<L>      08 Jun 2017 06:49  Phos  3.3     06-08  Mg     2.2     06-08    TPro  6.8  /  Alb  1.5<L>  /  TBili  0.3  /  DBili  x   /  AST  22  /  ALT  24  /  AlkPhos  100  06-08    A/P - Hartmanns with ilecolic resection, now with controlled anastomotic leak  WBC normalized. Drain cultures with numerous E.Coli, moderate E.faecium. On diflucan and meropenem.  Cont current care of NPO, TPN and octreotide for now.  Repeat CT A/P early next week.

## 2017-06-08 NOTE — PROGRESS NOTE ADULT - SUBJECTIVE AND OBJECTIVE BOX
HPI:  47M with no significant past medical history admitted on 5/19 for evaluation of lower abdominal pain, decreased appetite and upon admission was found to have sigmoid diverticulitis; patient was medically managed however, on 5/21 patient underwent sigmoid resection, ileocolostomy and currently is post op asking for ice chips. He has decreased urine output and worsening renal function as well as hypotension.  hospital course c/b septic shock/renal failure post-operatively, requiring pressor support/icu admission, found to have perihepatic collection s/p drainage on 5/30, noted to be clinically improving, but with low grade temps on 6/4  underwent repeat CT abd/pelvis 6/5 showing anastomotic leak/persistent peritonitis with new R abd collection  plan for IR drainage of collection 6/6 and PICC line for TPN   Today 6/7 patient comfortable, had dual lumen picc line placed, undergoing TPN  Today 6/8 patient feels good, no complaints, has aroma to stool that alerted nursing to check for cdiff      MEDICATIONS  (STANDING):  heparin  Injectable 5000Unit(s) SubCutaneous every 8 hours  ALBUTerol/ipratropium for Nebulization 3milliLiter(s) Nebulizer every 6 hours  octreotide  Injectable 100MICROGram(s) SubCutaneous every 8 hours  meropenem IVPB 1000milliGRAM(s) IV Intermittent every 8 hours  Parenteral Nutrition - Adult 1Each TPN Continuous <Continuous>  pantoprazole   Suspension 40milliGRAM(s) Oral daily  fluconAZOLE   Tablet 100milliGRAM(s) Oral daily  fat emulsion 20% IVPB 250milliLiter(s) IV Intermittent once  Parenteral Nutrition - Adult 1Each TPN Continuous <Continuous>    MEDICATIONS  (PRN):  naloxone Injectable 0.1milliGRAM(s) IV Push every 3 minutes PRN For ANY of the following changes in patient status:  A. RR LESS THAN 10 breaths per minute, B. Oxygen saturation LESS THAN 90%, C. Sedation score of 6  ondansetron Injectable 4milliGRAM(s) IV Push every 6 hours PRN Nausea  melatonin Oral Tab/Cap - Peds 3milliGRAM(s) Oral at bedtime PRN Insomnia  morphine  - Injectable 2milliGRAM(s) IV Push every 4 hours PRN Moderate Pain (4 - 6)  oxyCODONE  5 mG/acetaminophen 325 mG 1Tablet(s) Oral every 4 hours PRN Mild Pain (1 - 3)  oxyCODONE  5 mG/acetaminophen 325 mG 2Tablet(s) Oral every 4 hours PRN Moderate Pain (4 - 6)      Vital Signs Last 24 Hrs  T(C): 37, Max: 37 (06-07 @ 17:37)  T(F): 98.6, Max: 98.6 (06-07 @ 17:37)  HR: 105 (66 - 105)  BP: 106/66 (101/62 - 113/61)  BP(mean): --  RR: 18 (16 - 18)  SpO2: 97% (96% - 99%)    Physical Exam:          Physical Exam:  Constitutional: well developed  HEENT: NC/AT, EOMI, PERRLA  Neck: supple  Respiratory: clear  Cardiovascular: S1S2 regular, no murmurs  Abdomen: kimmy drain in place, dressing in place left sided ostomy, drain in right quadrant with feculent material  Genitourinary: deferred  Rectal: deferred  Musculoskeletal: no muscle tenderness, no joint swelling or tenderness  Neurological: AxOx3, moving all extremities, no focal deficits  Skin: no rashes    Labs:                                   10.8   13.5  )-----------( 520      ( 06 Jun 2017 06:47 )             32.0     06-06    136  |  101  |  8   ----------------------------<  102<H>  4.5   |  29  |  0.90    Ca    8.3<L>      06 Jun 2017 06:47  Phos  3.9     06-06  Mg     2.3     06-06      Culture - Body Fluid with Gram Stain (06.06.17 @ 13:50)    Gram Stain:   Few polymorphonuclear leukocytes per low power field  Moderate Gram Negative Rods per oil power field  Moderate Gram Positive Rods per oil power field  Few Gram Positive Cocci in Pairs and Chains per oil power field  Rare Yeast like cells per oil power field    Specimen Source: .Body Fluid Diverticular collection drainage    Culture Results:   Numerous Escherichia coli  Moderate Enterococcus faecium            Cultures: Culture - Blood (05.23.17 @ 11:20)    Gram Stain:   Growth in anaerobic bottle: Gram Positive Cocci in Clusters    Specimen Source: .Blood Blood    Culture Results:   Growth in anaerobic bottle: Coag Negative Staphylococcus  Single set isolate, possible contaminant. Contact  Microbiology if susceptibility testing clinically  indicated.    Culture - Body Fluid with Gram Stain (05.30.17 @ 16:30)    Gram Stain:   polymorphonuclear leukocytes seen  No organisms seen  by cytocentrifuge    Specimen Source: Abdominal Fl None    Culture Results:   No growth to date            Radiology:  EXAM:  CT ABDOMEN AND PELVIS OC IC                            PROCEDURE DATE:  06/05/2017        INTERPRETATION:  CT ABDOMEN AND PELVIS OC IC    HISTORY:  perforated diverticulitis s/p Hartgeoff,    Technique: CT of the abdomen and pelvis is performed with oral and   intravenous contrast. Axial images are supplemented with coronal and   sagittal reformations. This study was performed using automatic exposure   control (radiation dose reduction software) to obtain a diagnostic image   quality scan with patient dose as low as reasonably achievable.    Contrast: 90 cc Omnipaque 350    Comparison: CT abdomen and pelvis 5/29/2017    Findings:  LIVER: Normal.  SPLEEN: Normal.  PANCREAS: Normal.  GALLBLADDER/BILIARY TREE: Nondilated. Normal gallbladder.  ADRENALS: Stable right adrenal hemorrhage.  KIDNEYS: No calcification, hydronephrosis, or renal mass.  LYMPHADENOPATHY/RETROPERITONEUM: Prominent upper abdominal lymph nodes.  VASCULATURE: Normal caliber aorta.    BOWEL: Stable postsurgical anatomy after [employer] and ileocolic   anastomosis. Normal appearance of the rectal stump with an adjacent drain   in place. Unremarkable left lower quadrant colostomy. Oral contrast   material reaches the colostomy. No bowel obstruction.    PELVIC VISCERA: Unremarkable prostate, seminal vesicles, and urinary   bladder.  PELVIC LYMPH NODES: No pelvic adenopathy.    PERITONEUM/ABDOMINAL WALL: Persistent small pneumoperitoneum with new   droplets of gas on the left. Right perihepatic pigtail catheter is in   place. Increased thickening of the perihepatic and Morison's pouch   peritoneal lining indicative of peritonitis. Stable small perihepatic   fluid. Slightly increased pelvic free fluid.     There is interval development of an air, fluid, and oral contrast   collection at the level of the gallbladder fossa measuring 8.4 x 5.0 cm   (series 2 image 47). The oral contrast material is seen tracking from the   superior aspect of the ileocolic anastomosis.    SKELETAL: No acute bony abnormality.  LUNG BASES: Stable small right pleural effusion with right base   atelectasis. Decreased trace left pleural effusion.    IMPRESSION:     Definitive evidence of an anastomotic leak at the superior aspect of the   ileocolic anastomosis with air, fluid,and oral contrast material   tracking into the gallbladder fossa with the collection measuring 8.4 x   5.0 cm. Persistent pneumoperitoneum and free fluid. Right upper quadrant   drain in place. Thickening of the right-sided peritoneal lining   indicative of peritonitis.    These results were communicated to Dr. Boss by me over telephone at 1:45   PM on 6/5/2017.      EXAM:  CT ABDOMEN AND PELVIS IC                            PROCEDURE DATE:  05/19/2017        INTERPRETATION:  CLINICAL INFORMATION: 47-year-old man with abdominal   pain assess for appendicitis     TECHNIQUE:    CT of abdomen and pelvis was performed with axial images   were obtained from the diaphragm to pubic symphysis oral and IV contrast.    COMPARISON:  None.    FINDINGS:    Liver: Borderline hepatomegaly with mild fatty infiltration otherwise   within normal limits  Gallbladder: Within normal limits  Bile ducts: No intrahepatic or extrahepatic biliary dilatation  Pancreas: within normal limits    Spleen: within normal limits  Adrenal: within normal limits  Kidneys, Ureters and Bladder: Symmetric enhancement bilaterally. No   perinephric attending or collections. No hydronephrosis. No intrarenal   calculi. Normal caliber of the ureters. Limited unopacified nondistended   bladder.    Pelvis: No pelvic adenopathy or pelvic free fluid.  Small fat-containing   bilateral inguinal hernias.      Bowel: No bowel obstruction.  There are few scattered colonic   diverticula. There is focal thickening of sigmoid colon in the pelvis   associated with mesenteric fat stranding and thickening of adjacent   fascial planes with localized perforation and small air bubbles without   abscess. Findings are consistent with acute rupture diverticulitis. Small   free fluid adjacent to gas filled appendix.    Peritoneum: Small ascites, no organized fluid collections.    Retroperitoneum: within normal limits  Vessels: Patent.    Abdominal wall: Small fat-containing umbilical hernia.    Lower chest and lung Bases: The visualized lung bases clear except for   subsegmental dependent atelectasis. Heart normal in size.   Bones: Degenerative changes.     IMPRESSION:     Focal thickening of sigmoid colon with mesenteric fat stranding and   thickening of fascial planes with scattered adjacent air bubbles   consistent with acute diverticulitis without abscess. Small free fluid in   the abdomen extending to the right quadrant.    EXAM:  CT ABDOMEN AND PELVIS OC IC                            PROCEDURE DATE:  05/29/2017        INTERPRETATION:  CT OF THE ABDOMEN AND PELVIS WITH IV CONTRAST     CLINICAL INDICATION: diverticulitis, sepsis s/p hartmans    TECHNIQUE: CT scan of the abdomen and pelvis was performed from the domes   of the diaphragm to the symphysis pubis with oral and intravenous   contrast.  Intravenous administration of 90 cc of Omnipaque-350, 10 cc   discarded, was without complication.  Sagittal and coronalreformatted   images were provided.    COMPARISON: CT abdomen pelvis May 19, 2017    FINDINGS:   LOWER THORAX: Moderate bilateral pleural effusions with underlying   compressive atelectasis..    LIVER:  Moderate perihepatic ascites and adjacent free air..  GALLBLADDER: Unremarkable.  BILIARY TREE: Unremarkable.  PANCREAS: Unremarkable.  SPLEEN: Unremarkable.  ADRENALS: There is new expansion of the right adrenal gland with   nonsimple fluid/material, measuring approximately 4.0 x 2.6 cm, may   reflect hemorrhage within the right adrenal gland. Left adrenal gland   appears unremarkable.    KIDNEYS/URETERS: Unremarkable.    BOWEL: Status post sigmoid resection with left lower quadrant colostomy   present and suture material at the rectal stump.Additionally, partial   resection and anastomosis in the region of the cecum is present. There is   peripheral luminal air within the ascending bowel wall and cecum. There   is a surgical drainage catheter with tip in the mid pelvis. No focal   fluidcollection or abscess.    PERITONEUM/RETROPERITONEUM: Extensive free air in the upper abdomen,   compatible with recent surgery. Moderate perihepatic ascites and minimal   fluid in the bilateral paracolic gutters is present.    BLADDER: Small amount of air in the bladder, likely reflecting recent   Cooley catheterization.  REPRODUCTIVE ORGANS: Unremarkable.    VASCULATURE: Within normal limits.  ABDOMINAL WALL: Postsurgical changes within the abdominal wall are   present. Diffuse edema of the abdominal wall. Midline surgical staples   present.    BONES: No aggressive osseous lesion.    IMPRESSION:    Status post sigmoid resection with left lower quadrant colostomy present   and suture material at the rectal stump. Additionally, partial resection   and anastomosis in the region of the cecum is present. There is   peripheral luminal air within the ascending bowel wall and cecum. There   is a surgical drainage catheter with tip in the mid pelvis. No focal   fluid collection or abscess. Moderate perihepatic ascites and minimal   fluid in the bilateral paracolic gutters is present.    New expansion of the right adrenal gland with hyperattenuating material,   measuring approximately 4.0 x 2.6 cm, may reflect hemorrhage within the   right adrenal gland.     Moderate bilateral pleural effusions with underlying compressive   atelectasis..    Rest of the findings as above.    Advanced directive addressed: full resuscitation

## 2017-06-09 LAB
ALBUMIN SERPL ELPH-MCNC: 1.7 G/DL — LOW (ref 3.3–5)
ALP SERPL-CCNC: 108 U/L — SIGNIFICANT CHANGE UP (ref 40–120)
ALT FLD-CCNC: 31 U/L — SIGNIFICANT CHANGE UP (ref 12–78)
ANION GAP SERPL CALC-SCNC: 7 MMOL/L — SIGNIFICANT CHANGE UP (ref 5–17)
AST SERPL-CCNC: 27 U/L — SIGNIFICANT CHANGE UP (ref 15–37)
BILIRUB SERPL-MCNC: 0.3 MG/DL — SIGNIFICANT CHANGE UP (ref 0.2–1.2)
BUN SERPL-MCNC: 11 MG/DL — SIGNIFICANT CHANGE UP (ref 7–23)
CALCIUM SERPL-MCNC: 8.3 MG/DL — LOW (ref 8.5–10.1)
CHLORIDE SERPL-SCNC: 104 MMOL/L — SIGNIFICANT CHANGE UP (ref 96–108)
CO2 SERPL-SCNC: 28 MMOL/L — SIGNIFICANT CHANGE UP (ref 22–31)
CREAT SERPL-MCNC: 0.89 MG/DL — SIGNIFICANT CHANGE UP (ref 0.5–1.3)
GLUCOSE SERPL-MCNC: 105 MG/DL — HIGH (ref 70–99)
MAGNESIUM SERPL-MCNC: 2.3 MG/DL — SIGNIFICANT CHANGE UP (ref 1.6–2.6)
PHOSPHATE SERPL-MCNC: 3 MG/DL — SIGNIFICANT CHANGE UP (ref 2.5–4.5)
POTASSIUM SERPL-MCNC: 4.3 MMOL/L — SIGNIFICANT CHANGE UP (ref 3.5–5.3)
POTASSIUM SERPL-SCNC: 4.3 MMOL/L — SIGNIFICANT CHANGE UP (ref 3.5–5.3)
PROT SERPL-MCNC: 7.5 GM/DL — SIGNIFICANT CHANGE UP (ref 6–8.3)
SODIUM SERPL-SCNC: 139 MMOL/L — SIGNIFICANT CHANGE UP (ref 135–145)

## 2017-06-09 RX ORDER — I.V. FAT EMULSION 20 G/100ML
250 EMULSION INTRAVENOUS
Qty: 0 | Refills: 0 | Status: COMPLETED | OUTPATIENT
Start: 2017-06-09 | End: 2017-06-09

## 2017-06-09 RX ORDER — I.V. FAT EMULSION 20 G/100ML
50 EMULSION INTRAVENOUS
Qty: 0 | Refills: 0 | Status: DISCONTINUED | OUTPATIENT
Start: 2017-06-09 | End: 2017-06-09

## 2017-06-09 RX ORDER — ELECTROLYTE SOLUTION,INJ
1 VIAL (ML) INTRAVENOUS
Qty: 0 | Refills: 0 | Status: DISCONTINUED | OUTPATIENT
Start: 2017-06-09 | End: 2017-06-09

## 2017-06-09 RX ADMIN — MEROPENEM 200 MILLIGRAM(S): 1 INJECTION INTRAVENOUS at 21:43

## 2017-06-09 RX ADMIN — HEPARIN SODIUM 5000 UNIT(S): 5000 INJECTION INTRAVENOUS; SUBCUTANEOUS at 13:22

## 2017-06-09 RX ADMIN — Medication 1 EACH: at 22:27

## 2017-06-09 RX ADMIN — HEPARIN SODIUM 5000 UNIT(S): 5000 INJECTION INTRAVENOUS; SUBCUTANEOUS at 06:01

## 2017-06-09 RX ADMIN — OCTREOTIDE ACETATE 100 MICROGRAM(S): 200 INJECTION, SOLUTION INTRAVENOUS; SUBCUTANEOUS at 06:01

## 2017-06-09 RX ADMIN — MEROPENEM 200 MILLIGRAM(S): 1 INJECTION INTRAVENOUS at 13:20

## 2017-06-09 RX ADMIN — PANTOPRAZOLE SODIUM 40 MILLIGRAM(S): 20 TABLET, DELAYED RELEASE ORAL at 12:07

## 2017-06-09 RX ADMIN — I.V. FAT EMULSION 31.25 MILLILITER(S): 20 EMULSION INTRAVENOUS at 22:27

## 2017-06-09 RX ADMIN — MEROPENEM 200 MILLIGRAM(S): 1 INJECTION INTRAVENOUS at 06:01

## 2017-06-09 RX ADMIN — HEPARIN SODIUM 5000 UNIT(S): 5000 INJECTION INTRAVENOUS; SUBCUTANEOUS at 22:29

## 2017-06-09 RX ADMIN — FLUCONAZOLE 100 MILLIGRAM(S): 150 TABLET ORAL at 12:07

## 2017-06-09 RX ADMIN — OCTREOTIDE ACETATE 100 MICROGRAM(S): 200 INJECTION, SOLUTION INTRAVENOUS; SUBCUTANEOUS at 13:20

## 2017-06-09 RX ADMIN — OCTREOTIDE ACETATE 100 MICROGRAM(S): 200 INJECTION, SOLUTION INTRAVENOUS; SUBCUTANEOUS at 22:29

## 2017-06-09 NOTE — PROGRESS NOTE ADULT - SUBJECTIVE AND OBJECTIVE BOX
Subjective:      MEDICATIONS  (STANDING):  heparin  Injectable 5000Unit(s) SubCutaneous every 8 hours  ALBUTerol/ipratropium for Nebulization 3milliLiter(s) Nebulizer every 6 hours  octreotide  Injectable 100MICROGram(s) SubCutaneous every 8 hours  meropenem IVPB 1000milliGRAM(s) IV Intermittent every 8 hours  pantoprazole   Suspension 40milliGRAM(s) Oral daily  fluconAZOLE   Tablet 100milliGRAM(s) Oral daily  Parenteral Nutrition - Adult 1Each TPN Continuous <Continuous>    MEDICATIONS  (PRN):  naloxone Injectable 0.1milliGRAM(s) IV Push every 3 minutes PRN For ANY of the following changes in patient status:  A. RR LESS THAN 10 breaths per minute, B. Oxygen saturation LESS THAN 90%, C. Sedation score of 6  ondansetron Injectable 4milliGRAM(s) IV Push every 6 hours PRN Nausea  melatonin Oral Tab/Cap - Peds 3milliGRAM(s) Oral at bedtime PRN Insomnia  morphine  - Injectable 2milliGRAM(s) IV Push every 4 hours PRN Moderate Pain (4 - 6)  oxyCODONE  5 mG/acetaminophen 325 mG 1Tablet(s) Oral every 4 hours PRN Mild Pain (1 - 3)  oxyCODONE  5 mG/acetaminophen 325 mG 2Tablet(s) Oral every 4 hours PRN Moderate Pain (4 - 6)      Allergies    No Known Allergies    Intolerances        Vital Signs Last 24 Hrs  T(C): 37.1, Max: 37.1 (06-08 @ 21:57)  T(F): 98.8, Max: 98.8 (06-09 @ 06:04)  HR: 72 (59 - 105)  BP: 114/63 (106/66 - 114/63)  BP(mean): --  RR: 18 (18 - 18)  SpO2: 97% (97% - 100%)    PHYSICAL EXAMINATION:    NECK:  Supple. No lymphadenopathy. Jugular venous pressure not elevated. Carotids equal.   HEART:   The cardiac impulse has a normal quality. Reg., Nl S1 and S2.  There are no murmurs, rubs or gallops noted  CHEST:  Chest is clear to auscultation. Normal respiratory effort.  ABDOMEN:  Soft and nontender.   EXTREMITIES:  There is no edema.       LABS:                        9.8    9.7   )-----------( 431      ( 08 Jun 2017 06:49 )             29.4     06-08    139  |  103  |  9   ----------------------------<  116<H>  4.0   |  29  |  0.86    Ca    7.9<L>      08 Jun 2017 06:49  Phos  3.3     06-08  Mg     2.2     06-08    TPro  6.8  /  Alb  1.5<L>  /  TBili  0.3  /  DBili  x   /  AST  22  /  ALT  24  /  AlkPhos  100  06-08 Subjective:    pat sitting in chair, no new complaint.    MEDICATIONS  (STANDING):  heparin  Injectable 5000Unit(s) SubCutaneous every 8 hours  ALBUTerol/ipratropium for Nebulization 3milliLiter(s) Nebulizer every 6 hours  octreotide  Injectable 100MICROGram(s) SubCutaneous every 8 hours  meropenem IVPB 1000milliGRAM(s) IV Intermittent every 8 hours  pantoprazole   Suspension 40milliGRAM(s) Oral daily  fluconAZOLE   Tablet 100milliGRAM(s) Oral daily  Parenteral Nutrition - Adult 1Each TPN Continuous <Continuous>    MEDICATIONS  (PRN):  naloxone Injectable 0.1milliGRAM(s) IV Push every 3 minutes PRN For ANY of the following changes in patient status:  A. RR LESS THAN 10 breaths per minute, B. Oxygen saturation LESS THAN 90%, C. Sedation score of 6  ondansetron Injectable 4milliGRAM(s) IV Push every 6 hours PRN Nausea  melatonin Oral Tab/Cap - Peds 3milliGRAM(s) Oral at bedtime PRN Insomnia  morphine  - Injectable 2milliGRAM(s) IV Push every 4 hours PRN Moderate Pain (4 - 6)  oxyCODONE  5 mG/acetaminophen 325 mG 1Tablet(s) Oral every 4 hours PRN Mild Pain (1 - 3)  oxyCODONE  5 mG/acetaminophen 325 mG 2Tablet(s) Oral every 4 hours PRN Moderate Pain (4 - 6)      Allergies    No Known Allergies    Intolerances        Vital Signs Last 24 Hrs  T(C): 37.1, Max: 37.1 (06-08 @ 21:57)  T(F): 98.8, Max: 98.8 (06-09 @ 06:04)  HR: 72 (59 - 105)  BP: 114/63 (106/66 - 114/63)  BP(mean): --  RR: 18 (18 - 18)  SpO2: 97% (97% - 100%)    PHYSICAL EXAMINATION:    NECK:  Supple. No lymphadenopathy. Jugular venous pressure not elevated. Carotids equal.   HEART:   The cardiac impulse has a normal quality. Reg., Nl S1 and S2.  There are no murmurs, rubs or gallops noted  CHEST:  Chest crackles to auscultation. Normal respiratory effort.  ABDOMEN:  Soft and nontender.   EXTREMITIES:  There is no edema.       LABS:                        9.8    9.7   )-----------( 431      ( 08 Jun 2017 06:49 )             29.4     06-08    139  |  103  |  9   ----------------------------<  116<H>  4.0   |  29  |  0.86    Ca    7.9<L>      08 Jun 2017 06:49  Phos  3.3     06-08  Mg     2.2     06-08    TPro  6.8  /  Alb  1.5<L>  /  TBili  0.3  /  DBili  x   /  AST  22  /  ALT  24  /  AlkPhos  100  06-08

## 2017-06-09 NOTE — PROGRESS NOTE ADULT - SUBJECTIVE AND OBJECTIVE BOX
Patient is a 47y old  Male who presents with a chief complaint of Sigmoid diverticulitis (19 May 2017 18:07)      Subective:  Feels good. Tolerates TPN      MEDICATIONS  (STANDING):  heparin  Injectable 5000Unit(s) SubCutaneous every 8 hours  ALBUTerol/ipratropium for Nebulization 3milliLiter(s) Nebulizer every 6 hours  octreotide  Injectable 100MICROGram(s) SubCutaneous every 8 hours  meropenem IVPB 1000milliGRAM(s) IV Intermittent every 8 hours  pantoprazole   Suspension 40milliGRAM(s) Oral daily  fluconAZOLE   Tablet 100milliGRAM(s) Oral daily  Parenteral Nutrition - Adult 1Each TPN Continuous <Continuous>  Parenteral Nutrition - Adult 1Each TPN Continuous <Continuous>  fat emulsion 20% Infusion 50Gram(s) IV Continuous <Continuous>    MEDICATIONS  (PRN):  naloxone Injectable 0.1milliGRAM(s) IV Push every 3 minutes PRN For ANY of the following changes in patient status:  A. RR LESS THAN 10 breaths per minute, B. Oxygen saturation LESS THAN 90%, C. Sedation score of 6  ondansetron Injectable 4milliGRAM(s) IV Push every 6 hours PRN Nausea  melatonin Oral Tab/Cap - Peds 3milliGRAM(s) Oral at bedtime PRN Insomnia  morphine  - Injectable 2milliGRAM(s) IV Push every 4 hours PRN Moderate Pain (4 - 6)  oxyCODONE  5 mG/acetaminophen 325 mG 1Tablet(s) Oral every 4 hours PRN Mild Pain (1 - 3)  oxyCODONE  5 mG/acetaminophen 325 mG 2Tablet(s) Oral every 4 hours PRN Moderate Pain (4 - 6)      REVIEW OF SYSTEMS:    RESPIRATORY: No shortness of breath  CARDIOVASCULAR: No chest pain  All other review of systems is negative unless indicated above.    Vital Signs Last 24 Hrs  T(C): 37.1, Max: 37.1 (06-08 @ 21:57)  T(F): 98.8, Max: 98.8 (06-09 @ 06:04)  HR: 72 (59 - 105)  BP: 114/63 (106/66 - 114/63)  BP(mean): --  RR: 18 (18 - 18)  SpO2: 97% (97% - 100%)    PHYSICAL EXAM:    Constitutional: NAD, well-developed  Respiratory: CTAB  Cardiovascular: S1 and S2, RRR  Gastrointestinal: BS+, soft, NT/ND  Psychiatric: Normal mood, normal affect    LABS:                        9.8    9.7   )-----------( 431      ( 08 Jun 2017 06:49 )             29.4     06-08    139  |  103  |  9   ----------------------------<  116<H>  4.0   |  29  |  0.86    Ca    7.9<L>      08 Jun 2017 06:49  Phos  3.3     06-08  Mg     2.2     06-08    TPro  6.8  /  Alb  1.5<L>  /  TBili  0.3  /  DBili  x   /  AST  22  /  ALT  24  /  AlkPhos  100  06-08      LIVER FUNCTIONS - ( 08 Jun 2017 06:49 )  Alb: 1.5 g/dL / Pro: 6.8 gm/dL / ALK PHOS: 100 U/L / ALT: 24 U/L / AST: 22 U/L / GGT: x

## 2017-06-09 NOTE — PROGRESS NOTE ADULT - ASSESSMENT
47M admitted for a 24hr h/o progressive abdominal pain that began after a 1-2 day trip to [facility]. CT with acute perforated sigmoid diverticulitis, s/p LAP with resection (Hartmanns/ileocolic resection) complicated by septic shock secondary to the above presently off pressor support.     s/p hartmans procedure for perforated diverticulitis as well as ileocolic resection with anastomosis and controlled anastomotic leak:   NPO, bowel rest,  TPN.  CCT-> perihepatic ascites/fluid collection; repeat CT next week

## 2017-06-09 NOTE — PROGRESS NOTE ADULT - SUBJECTIVE AND OBJECTIVE BOX
6/7: no complaints    6/8:  feeling better, no complaints    6/9: sitting conformably no complaints      PHYSICAL EXAM:    Daily     Daily     ICU Vital Signs Last 24 Hrs  T(C): 36.3, Max: 37.1 (06-08 @ 21:57)  T(F): 97.4, Max: 98.8 (06-09 @ 06:04)  HR: 64 (59 - 72)  BP: 108/63 (108/63 - 114/63)  BP(mean): --  ABP: --  ABP(mean): --  RR: 18 (18 - 18)  SpO2: 100% (97% - 100%)      Constitutional: Well appearing  HEENT: Atraumatic, MAHMAED, Normal, No congestion  Respiratory: Breath Sounds normal, no rhonchi/wheeze  Cardiovascular: N S1S2; MASOOD present  Gastrointestinal: Abdomen soft, non tender, Bowel Sounds present; colostomy bag present  Extremities: No edema, peripheral pulses present  Neurological: AAO x 3, no gross focal motor deficits  Skin: Non cellulitic, no rash, ulcers  Lymph Nodes: No lymphadenopathy noted  Back: No CVA tenderness   Musculoskeletal: non tender  Breasts: Deferred  Genitourinary: deferred  Rectal: Deferred                          9.8    9.7   )-----------( 431      ( 08 Jun 2017 06:49 )             29.4       CBC Full  -  ( 08 Jun 2017 06:49 )  WBC Count : 9.7 K/uL  Hemoglobin : 9.8 g/dL  Hematocrit : 29.4 %  Platelet Count - Automated : 431 K/uL  Mean Cell Volume : 89.4 fl  Mean Cell Hemoglobin : 30.0 pg  Mean Cell Hemoglobin Concentration : 33.5 gm/dL  Auto Neutrophil # : x  Auto Lymphocyte # : x  Auto Monocyte # : x  Auto Eosinophil # : x  Auto Basophil # : x  Auto Neutrophil % : x  Auto Lymphocyte % : x  Auto Monocyte % : x  Auto Eosinophil % : x  Auto Basophil % : x      06-09    139  |  104  |  11  ----------------------------<  105<H>  4.3   |  28  |  0.89    Ca    8.3<L>      09 Jun 2017 10:21  Phos  3.0     06-09  Mg     2.3     06-09    TPro  7.5  /  Alb  1.7<L>  /  TBili  0.3  /  DBili  x   /  AST  27  /  ALT  31  /  AlkPhos  108  06-09      LIVER FUNCTIONS - ( 09 Jun 2017 10:21 )  Alb: 1.7 g/dL / Pro: 7.5 gm/dL / ALK PHOS: 108 U/L / ALT: 31 U/L / AST: 27 U/L / GGT: x                               MEDICATIONS  (STANDING):  heparin  Injectable 5000Unit(s) SubCutaneous every 8 hours  ALBUTerol/ipratropium for Nebulization 3milliLiter(s) Nebulizer every 6 hours  octreotide  Injectable 100MICROGram(s) SubCutaneous every 8 hours  meropenem IVPB 1000milliGRAM(s) IV Intermittent every 8 hours  pantoprazole   Suspension 40milliGRAM(s) Oral daily  fluconAZOLE   Tablet 100milliGRAM(s) Oral daily  Parenteral Nutrition - Adult 1Each TPN Continuous <Continuous>  fat emulsion 20% IVPB 250milliLiter(s) IV Intermittent <User Schedule>  Parenteral Nutrition - Adult 1Each TPN Continuous <Continuous>

## 2017-06-09 NOTE — PROGRESS NOTE ADULT - ASSESSMENT
47M with no significant past medical history admitted on 5/19 for evaluation of lower abdominal pain, decreased appetite and upon admission was found to have sigmoid diverticulitis; patient was medically managed however, on 5/21 patient underwent sigmoid resection, ileocolostomy and currently is post op asking for ice chips. He has decreased urine output and worsening renal function as well as hypotension  1. diverticulitis s/p sigmoid resection/ileostomy c/b sepsis & post-op collection s/p drainage now with persistent peritonitis/abd collection/anastomotic leak  - had IR drainage of fluid collection  -day #4 meroepnem  -day #3 diflucan given yeast in culture  - will not treat the panresistant E faecium, representative of bowel gregory  -drain in place  Will follow                  - afebrile currently and hd stable  - start IV meropenem after IR drainage 1gmq8h for gram (-) resistant/anaroebic coverage for gi tract  -previously completed course of vancomycin/diflucan/zosyn, antibiotics discontinued on 6/1  - tolerating antibiotics without rashes or side effects   - iv hydration and supportive care

## 2017-06-09 NOTE — PROGRESS NOTE ADULT - SUBJECTIVE AND OBJECTIVE BOX
HPI:  47M with no significant past medical history admitted on 5/19 for evaluation of lower abdominal pain, decreased appetite and upon admission was found to have sigmoid diverticulitis; patient was medically managed however, on 5/21 patient underwent sigmoid resection, ileocolostomy and currently is post op asking for ice chips. He has decreased urine output and worsening renal function as well as hypotension.  hospital course c/b septic shock/renal failure post-operatively, requiring pressor support/icu admission, found to have perihepatic collection s/p drainage on 5/30, noted to be clinically improving, but with low grade temps on 6/4  underwent repeat CT abd/pelvis 6/5 showing anastomotic leak/persistent peritonitis with new R abd collection  plan for IR drainage of collection 6/6 and PICC line for TPN   Today 6/7 patient comfortable, had dual lumen picc line placed, undergoing TPN  Today 6/8 patient feels good, no complaints, has aroma to stool that alerted nursing to check for cdiff  Today 6/9 patient without complaints, cdiff assy negative    MEDICATIONS  (STANDING):  heparin  Injectable 5000Unit(s) SubCutaneous every 8 hours  ALBUTerol/ipratropium for Nebulization 3milliLiter(s) Nebulizer every 6 hours  octreotide  Injectable 100MICROGram(s) SubCutaneous every 8 hours  meropenem IVPB 1000milliGRAM(s) IV Intermittent every 8 hours  pantoprazole   Suspension 40milliGRAM(s) Oral daily  fluconAZOLE   Tablet 100milliGRAM(s) Oral daily  Parenteral Nutrition - Adult 1Each TPN Continuous <Continuous>  fat emulsion 20% IVPB 250milliLiter(s) IV Intermittent <User Schedule>  Parenteral Nutrition - Adult 1Each TPN Continuous <Continuous>    MEDICATIONS  (PRN):  naloxone Injectable 0.1milliGRAM(s) IV Push every 3 minutes PRN For ANY of the following changes in patient status:  A. RR LESS THAN 10 breaths per minute, B. Oxygen saturation LESS THAN 90%, C. Sedation score of 6  ondansetron Injectable 4milliGRAM(s) IV Push every 6 hours PRN Nausea  melatonin Oral Tab/Cap - Peds 3milliGRAM(s) Oral at bedtime PRN Insomnia  morphine  - Injectable 2milliGRAM(s) IV Push every 4 hours PRN Moderate Pain (4 - 6)  oxyCODONE  5 mG/acetaminophen 325 mG 1Tablet(s) Oral every 4 hours PRN Mild Pain (1 - 3)  oxyCODONE  5 mG/acetaminophen 325 mG 2Tablet(s) Oral every 4 hours PRN Moderate Pain (4 - 6)      Vital Signs Last 24 Hrs  T(C): 36.3, Max: 37.1 (06-08 @ 21:57)  T(F): 97.4, Max: 98.8 (06-09 @ 06:04)  HR: 64 (59 - 72)  BP: 108/63 (108/63 - 114/63)  BP(mean): --  RR: 18 (18 - 18)  SpO2: 100% (97% - 100%)    Physical Exam:          Physical Exam:  Constitutional: well developed  HEENT: NC/AT, EOMI, PERRLA  Neck: supple  Respiratory: clear  Cardiovascular: S1S2 regular, no murmurs  Abdomen: kimmy drain in place, dressing in place left sided ostomy, drain in right quadrant with feculent material  Genitourinary: deferred  Rectal: deferred  Musculoskeletal: no muscle tenderness, no joint swelling or tenderness  Neurological: AxOx3, moving all extremities, no focal deficits  Skin: no rashes    Labs:                                   10.8   13.5  )-----------( 520      ( 06 Jun 2017 06:47 )             32.0     06-06    136  |  101  |  8   ----------------------------<  102<H>  4.5   |  29  |  0.90    Ca    8.3<L>      06 Jun 2017 06:47  Phos  3.9     06-06  Mg     2.3     06-06      Culture - Body Fluid with Gram Stain (06.06.17 @ 13:50)    -  Ampicillin: S 4    -  Cefazolin: S <=2    -  Ceftriaxone: S <=1    -  Ertapenem: S <=0.5    -  Gentamicin: S <=1    -  Piperacillin/Tazobactam: S <=8    -  Tetra/Doxy: R >8    -  Vancomycin: R >16    -  Ceftazidime: S <=1    -  Ciprofloxacin: S <=0.5    -  Levofloxacin: S <=1    -  Trimethoprim/Sulfamethoxazole: S <=0.5/9.5    -  Ampicillin: R >8    -  Aztreonam: S <=4    -  Cefepime: S <=2    -  Cefoxitin: S <=4    -  Meropenem: S <=1    -  Tobramycin: S 4    Gram Stain:   Few polymorphonuclear leukocytes per low power field  Moderate Gram Negative Rods per oil power field  Moderate Gram Positive Rods per oil power field  Few Gram Positive Cocci in Pairs and Chains per oil power field  Rare Yeast like cells per oil power field    -  Amikacin: S <=8    -  Ampicillin/Sulbactam: S <=4/2    -  Ciprofloxacin: R >2    -  Erythromycin: R >4    -  Imipenem: S <=1    Specimen Source: .Body Fluid Diverticular collection drainage    Culture Results:   Numerous Escherichia coli  Moderate Enterococcus faecium  Few Yeast  Rule Out Anaerobes    Organism Identification: Escherichia coli  Enterococcus faecium    Organism: Escherichia coli    Organism: Enterococcus faecium    Method Type: MADISON    Method Type: MADISON              Cultures: Culture - Blood (05.23.17 @ 11:20)    Gram Stain:   Growth in anaerobic bottle: Gram Positive Cocci in Clusters    Specimen Source: .Blood Blood    Culture Results:   Growth in anaerobic bottle: Coag Negative Staphylococcus  Single set isolate, possible contaminant. Contact  Microbiology if susceptibility testing clinically  indicated.    Culture - Body Fluid with Gram Stain (05.30.17 @ 16:30)    Gram Stain:   polymorphonuclear leukocytes seen  No organisms seen  by cytocentrifuge    Specimen Source: Abdominal Fl None    Culture Results:   No growth to date            Radiology:  EXAM:  CT ABDOMEN AND PELVIS OC IC                            PROCEDURE DATE:  06/05/2017        INTERPRETATION:  CT ABDOMEN AND PELVIS OC IC    HISTORY:  perforated diverticulitis s/p Hartmans,    Technique: CT of the abdomen and pelvis is performed with oral and   intravenous contrast. Axial images are supplemented with coronal and   sagittal reformations. This study was performed using automatic exposure   control (radiation dose reduction software) to obtain a diagnostic image   quality scan with patient dose as low as reasonably achievable.    Contrast: 90 cc Omnipaque 350    Comparison: CT abdomen and pelvis 5/29/2017    Findings:  LIVER: Normal.  SPLEEN: Normal.  PANCREAS: Normal.  GALLBLADDER/BILIARY TREE: Nondilated. Normal gallbladder.  ADRENALS: Stable right adrenal hemorrhage.  KIDNEYS: No calcification, hydronephrosis, or renal mass.  LYMPHADENOPATHY/RETROPERITONEUM: Prominent upper abdominal lymph nodes.  VASCULATURE: Normal caliber aorta.    BOWEL: Stable postsurgical anatomy after [employer] and ileocolic   anastomosis. Normal appearance of the rectal stump with an adjacent drain   in place. Unremarkable left lower quadrant colostomy. Oral contrast   material reaches the colostomy. No bowel obstruction.    PELVIC VISCERA: Unremarkable prostate, seminal vesicles, and urinary   bladder.  PELVIC LYMPH NODES: No pelvic adenopathy.    PERITONEUM/ABDOMINAL WALL: Persistent small pneumoperitoneum with new   droplets of gas on the left. Right perihepatic pigtail catheter is in   place. Increased thickening of the perihepatic and Morison's pouch   peritoneal lining indicative of peritonitis. Stable small perihepatic   fluid. Slightly increased pelvic free fluid.     There is interval development of an air, fluid, and oral contrast   collection at the level of the gallbladder fossa measuring 8.4 x 5.0 cm   (series 2 image 47). The oral contrast material is seen tracking from the   superior aspect of the ileocolic anastomosis.    SKELETAL: No acute bony abnormality.  LUNG BASES: Stable small right pleural effusion with right base   atelectasis. Decreased trace left pleural effusion.    IMPRESSION:     Definitive evidence of an anastomotic leak at the superior aspect of the   ileocolic anastomosis with air, fluid,and oral contrast material   tracking into the gallbladder fossa with the collection measuring 8.4 x   5.0 cm. Persistent pneumoperitoneum and free fluid. Right upper quadrant   drain in place. Thickening of the right-sided peritoneal lining   indicative of peritonitis.    These results were communicated to Dr. Boss by me over telephone at 1:45   PM on 6/5/2017.      EXAM:  CT ABDOMEN AND PELVIS IC                            PROCEDURE DATE:  05/19/2017        INTERPRETATION:  CLINICAL INFORMATION: 47-year-old man with abdominal   pain assess for appendicitis     TECHNIQUE:    CT of abdomen and pelvis was performed with axial images   were obtained from the diaphragm to pubic symphysis oral and IV contrast.    COMPARISON:  None.    FINDINGS:    Liver: Borderline hepatomegaly with mild fatty infiltration otherwise   within normal limits  Gallbladder: Within normal limits  Bile ducts: No intrahepatic or extrahepatic biliary dilatation  Pancreas: within normal limits    Spleen: within normal limits  Adrenal: within normal limits  Kidneys, Ureters and Bladder: Symmetric enhancement bilaterally. No   perinephric attending or collections. No hydronephrosis. No intrarenal   calculi. Normal caliber of the ureters. Limited unopacified nondistended   bladder.    Pelvis: No pelvic adenopathy or pelvic free fluid.  Small fat-containing   bilateral inguinal hernias.      Bowel: No bowel obstruction.  There are few scattered colonic   diverticula. There is focal thickening of sigmoid colon in the pelvis   associated with mesenteric fat stranding and thickening of adjacent   fascial planes with localized perforation and small air bubbles without   abscess. Findings are consistent with acute rupture diverticulitis. Small   free fluid adjacent to gas filled appendix.    Peritoneum: Small ascites, no organized fluid collections.    Retroperitoneum: within normal limits  Vessels: Patent.    Abdominal wall: Small fat-containing umbilical hernia.    Lower chest and lung Bases: The visualized lung bases clear except for   subsegmental dependent atelectasis. Heart normal in size.   Bones: Degenerative changes.     IMPRESSION:     Focal thickening of sigmoid colon with mesenteric fat stranding and   thickening of fascial planes with scattered adjacent air bubbles   consistent with acute diverticulitis without abscess. Small free fluid in   the abdomen extending to the right quadrant.    EXAM:  CT ABDOMEN AND PELVIS OC IC                            PROCEDURE DATE:  05/29/2017        INTERPRETATION:  CT OF THE ABDOMEN AND PELVIS WITH IV CONTRAST     CLINICAL INDICATION: diverticulitis, sepsis s/p hartmans    TECHNIQUE: CT scan of the abdomen and pelvis was performed from the domes   of the diaphragm to the symphysis pubis with oral and intravenous   contrast.  Intravenous administration of 90 cc of Omnipaque-350, 10 cc   discarded, was without complication.  Sagittal and coronalreformatted   images were provided.    COMPARISON: CT abdomen pelvis May 19, 2017    FINDINGS:   LOWER THORAX: Moderate bilateral pleural effusions with underlying   compressive atelectasis..    LIVER:  Moderate perihepatic ascites and adjacent free air..  GALLBLADDER: Unremarkable.  BILIARY TREE: Unremarkable.  PANCREAS: Unremarkable.  SPLEEN: Unremarkable.  ADRENALS: There is new expansion of the right adrenal gland with   nonsimple fluid/material, measuring approximately 4.0 x 2.6 cm, may   reflect hemorrhage within the right adrenal gland. Left adrenal gland   appears unremarkable.    KIDNEYS/URETERS: Unremarkable.    BOWEL: Status post sigmoid resection with left lower quadrant colostomy   present and suture material at the rectal stump.Additionally, partial   resection and anastomosis in the region of the cecum is present. There is   peripheral luminal air within the ascending bowel wall and cecum. There   is a surgical drainage catheter with tip in the mid pelvis. No focal   fluidcollection or abscess.    PERITONEUM/RETROPERITONEUM: Extensive free air in the upper abdomen,   compatible with recent surgery. Moderate perihepatic ascites and minimal   fluid in the bilateral paracolic gutters is present.    BLADDER: Small amount of air in the bladder, likely reflecting recent   Cooley catheterization.  REPRODUCTIVE ORGANS: Unremarkable.    VASCULATURE: Within normal limits.  ABDOMINAL WALL: Postsurgical changes within the abdominal wall are   present. Diffuse edema of the abdominal wall. Midline surgical staples   present.    BONES: No aggressive osseous lesion.    IMPRESSION:    Status post sigmoid resection with left lower quadrant colostomy present   and suture material at the rectal stump. Additionally, partial resection   and anastomosis in the region of the cecum is present. There is   peripheral luminal air within the ascending bowel wall and cecum. There   is a surgical drainage catheter with tip in the mid pelvis. No focal   fluid collection or abscess. Moderate perihepatic ascites and minimal   fluid in the bilateral paracolic gutters is present.    New expansion of the right adrenal gland with hyperattenuating material,   measuring approximately 4.0 x 2.6 cm, may reflect hemorrhage within the   right adrenal gland.     Moderate bilateral pleural effusions with underlying compressive   atelectasis..    Rest of the findings as above.    Advanced directive addressed: full resuscitation

## 2017-06-09 NOTE — PROGRESS NOTE ADULT - SUBJECTIVE AND OBJECTIVE BOX
No acute events  no n/v    Vital Signs Last 24 Hrs  T(C): 36.3, Max: 37.1 (06-08 @ 21:57)  T(F): 97.4, Max: 98.8 (06-09 @ 06:04)  HR: 64 (59 - 72)  BP: 108/63 (108/63 - 114/63)  BP(mean): --  RR: 18 (18 - 18)  SpO2: 100% (97% - 100%)    NAD  abd soft, ostomy with liquid output  drain on right side with 200ml over last shift                          9.8    9.7   )-----------( 431      ( 08 Jun 2017 06:49 )             29.4     06-09    139  |  104  |  11  ----------------------------<  105<H>  4.3   |  28  |  0.89    Ca    8.3<L>      09 Jun 2017 10:21  Phos  3.0     06-09  Mg     2.3     06-09    TPro  7.5  /  Alb  1.7<L>  /  TBili  0.3  /  DBili  x   /  AST  27  /  ALT  31  /  AlkPhos  108  06-09

## 2017-06-09 NOTE — PROGRESS NOTE ADULT - ASSESSMENT
Hartmanns with ilecolic resection, now with controlled anastomotic leak   WBC normalized. Drain cultures with numerous E.Coli, moderate E.faecium. On diflucan and meropenem.  Cont current care of NPO, TPN and octreotide for now.  Repeat CT A/P on monday.    Pt seen and examined with Dr. Doe

## 2017-06-10 LAB
-  AMIKACIN: SIGNIFICANT CHANGE UP
-  AMPICILLIN/SULBACTAM: SIGNIFICANT CHANGE UP
-  AMPICILLIN: SIGNIFICANT CHANGE UP
-  AZTREONAM: SIGNIFICANT CHANGE UP
-  CEFAZOLIN: SIGNIFICANT CHANGE UP
-  CEFEPIME: SIGNIFICANT CHANGE UP
-  CEFOXITIN: SIGNIFICANT CHANGE UP
-  CEFTAZIDIME: SIGNIFICANT CHANGE UP
-  CEFTRIAXONE: SIGNIFICANT CHANGE UP
-  CIPROFLOXACIN: SIGNIFICANT CHANGE UP
-  ERTAPENEM: SIGNIFICANT CHANGE UP
-  GENTAMICIN: SIGNIFICANT CHANGE UP
-  IMIPENEM: SIGNIFICANT CHANGE UP
-  LEVOFLOXACIN: SIGNIFICANT CHANGE UP
-  MEROPENEM: SIGNIFICANT CHANGE UP
-  PIPERACILLIN/TAZOBACTAM: SIGNIFICANT CHANGE UP
-  TOBRAMYCIN: SIGNIFICANT CHANGE UP
-  TRIMETHOPRIM/SULFAMETHOXAZOLE: SIGNIFICANT CHANGE UP
ANION GAP SERPL CALC-SCNC: 5 MMOL/L — SIGNIFICANT CHANGE UP (ref 5–17)
BUN SERPL-MCNC: 10 MG/DL — SIGNIFICANT CHANGE UP (ref 7–23)
CALCIUM SERPL-MCNC: 8 MG/DL — LOW (ref 8.5–10.1)
CHLORIDE SERPL-SCNC: 102 MMOL/L — SIGNIFICANT CHANGE UP (ref 96–108)
CO2 SERPL-SCNC: 31 MMOL/L — SIGNIFICANT CHANGE UP (ref 22–31)
CREAT SERPL-MCNC: 0.9 MG/DL — SIGNIFICANT CHANGE UP (ref 0.5–1.3)
CULTURE RESULTS: SIGNIFICANT CHANGE UP
GLUCOSE SERPL-MCNC: 114 MG/DL — HIGH (ref 70–99)
HCT VFR BLD CALC: 31.8 % — LOW (ref 39–50)
HGB BLD-MCNC: 10.7 G/DL — LOW (ref 13–17)
MAGNESIUM SERPL-MCNC: 2.1 MG/DL — SIGNIFICANT CHANGE UP (ref 1.6–2.6)
MCHC RBC-ENTMCNC: 29.5 PG — SIGNIFICANT CHANGE UP (ref 27–34)
MCHC RBC-ENTMCNC: 33.5 GM/DL — SIGNIFICANT CHANGE UP (ref 32–36)
MCV RBC AUTO: 87.9 FL — SIGNIFICANT CHANGE UP (ref 80–100)
METHOD TYPE: SIGNIFICANT CHANGE UP
ORGANISM # SPEC MICROSCOPIC CNT: SIGNIFICANT CHANGE UP
PHOSPHATE SERPL-MCNC: 3 MG/DL — SIGNIFICANT CHANGE UP (ref 2.5–4.5)
PLATELET # BLD AUTO: 492 K/UL — HIGH (ref 150–400)
POTASSIUM SERPL-MCNC: 4.3 MMOL/L — SIGNIFICANT CHANGE UP (ref 3.5–5.3)
POTASSIUM SERPL-SCNC: 4.3 MMOL/L — SIGNIFICANT CHANGE UP (ref 3.5–5.3)
RBC # BLD: 3.62 M/UL — LOW (ref 4.2–5.8)
RBC # FLD: 12.9 % — SIGNIFICANT CHANGE UP (ref 10.3–14.5)
SODIUM SERPL-SCNC: 138 MMOL/L — SIGNIFICANT CHANGE UP (ref 135–145)
SPECIMEN SOURCE: SIGNIFICANT CHANGE UP
WBC # BLD: 9.6 K/UL — SIGNIFICANT CHANGE UP (ref 3.8–10.5)
WBC # FLD AUTO: 9.6 K/UL — SIGNIFICANT CHANGE UP (ref 3.8–10.5)

## 2017-06-10 RX ORDER — PANTOPRAZOLE SODIUM 20 MG/1
40 TABLET, DELAYED RELEASE ORAL DAILY
Qty: 0 | Refills: 0 | Status: DISCONTINUED | OUTPATIENT
Start: 2017-06-10 | End: 2017-06-16

## 2017-06-10 RX ORDER — I.V. FAT EMULSION 20 G/100ML
50 EMULSION INTRAVENOUS
Qty: 0 | Refills: 0 | Status: DISCONTINUED | OUTPATIENT
Start: 2017-06-10 | End: 2017-06-10

## 2017-06-10 RX ORDER — ELECTROLYTE SOLUTION,INJ
1 VIAL (ML) INTRAVENOUS
Qty: 0 | Refills: 0 | Status: DISCONTINUED | OUTPATIENT
Start: 2017-06-10 | End: 2017-06-10

## 2017-06-10 RX ORDER — ALTEPLASE 100 MG
2 KIT INTRAVENOUS ONCE
Qty: 0 | Refills: 0 | Status: DISCONTINUED | OUTPATIENT
Start: 2017-06-10 | End: 2017-06-16

## 2017-06-10 RX ADMIN — MEROPENEM 200 MILLIGRAM(S): 1 INJECTION INTRAVENOUS at 22:25

## 2017-06-10 RX ADMIN — OCTREOTIDE ACETATE 100 MICROGRAM(S): 200 INJECTION, SOLUTION INTRAVENOUS; SUBCUTANEOUS at 06:05

## 2017-06-10 RX ADMIN — MEROPENEM 200 MILLIGRAM(S): 1 INJECTION INTRAVENOUS at 06:04

## 2017-06-10 RX ADMIN — MEROPENEM 200 MILLIGRAM(S): 1 INJECTION INTRAVENOUS at 14:11

## 2017-06-10 RX ADMIN — HEPARIN SODIUM 5000 UNIT(S): 5000 INJECTION INTRAVENOUS; SUBCUTANEOUS at 14:10

## 2017-06-10 RX ADMIN — HEPARIN SODIUM 5000 UNIT(S): 5000 INJECTION INTRAVENOUS; SUBCUTANEOUS at 06:04

## 2017-06-10 RX ADMIN — OCTREOTIDE ACETATE 100 MICROGRAM(S): 200 INJECTION, SOLUTION INTRAVENOUS; SUBCUTANEOUS at 22:25

## 2017-06-10 RX ADMIN — Medication 1 EACH: at 22:19

## 2017-06-10 RX ADMIN — OCTREOTIDE ACETATE 100 MICROGRAM(S): 200 INJECTION, SOLUTION INTRAVENOUS; SUBCUTANEOUS at 14:11

## 2017-06-10 RX ADMIN — I.V. FAT EMULSION 50 GRAM(S): 20 EMULSION INTRAVENOUS at 22:19

## 2017-06-10 RX ADMIN — HEPARIN SODIUM 5000 UNIT(S): 5000 INJECTION INTRAVENOUS; SUBCUTANEOUS at 22:25

## 2017-06-10 RX ADMIN — FLUCONAZOLE 100 MILLIGRAM(S): 150 TABLET ORAL at 11:36

## 2017-06-10 RX ADMIN — PANTOPRAZOLE SODIUM 40 MILLIGRAM(S): 20 TABLET, DELAYED RELEASE ORAL at 11:32

## 2017-06-10 NOTE — PROGRESS NOTE ADULT - ASSESSMENT
47M admitted for a 24hr h/o progressive abdominal pain that began after a 1-2 day trip to [facility]. CT with acute perforated sigmoid diverticulitis, s/p LAP with resection (Hartmanns/ileocolic resection) complicated by septic shock secondary to the above presently off pressor support. Rec'd large of INF for post fluid resuscitation now with anasarca      1)   Problem: s/p hartmans procedure for perforated diverticulitis as well as ileocolic resection with anastomosis and controlled anastomotic leak:  started on meropenem day 5 and diflucan day 4 as per ID   NPO, bowel rest,  TPN.  CCT-> perihepatic ascites/fluid collection; repeat CT abdo on Monday 6/12  Has rec's 14 days of abx.   Diflucan for yeast in culture    2) Problem: Anasarca.  Plan: resolved  stop diuretics  Ambulate    3) Problem: HARDIK (acute kidney injury).  Plan: 2/2 to ATN from septic shock-> both resolved.     poc discussed with pt, team

## 2017-06-10 NOTE — PROGRESS NOTE ADULT - SUBJECTIVE AND OBJECTIVE BOX
6/7: no complaints    6/8:  feeling better, no complaints    6/9: sitting conformably no complaints    6/10: no complaints      PHYSICAL EXAM:    Daily     Daily     ICU Vital Signs Last 24 Hrs  T(C): 37, Max: 37.2 (06-09 @ 21:54)  T(F): 98.6, Max: 98.9 (06-09 @ 21:54)  HR: 57 (57 - 62)  BP: 107/61 (107/61 - 111/68)  BP(mean): --  ABP: --  ABP(mean): --  RR: 18 (15 - 18)  SpO2: 99% (97% - 100%)      Constitutional: Well appearing  HEENT: Atraumatic, MAHAMED, Normal, No congestion  Respiratory: Breath Sounds normal, no rhonchi/wheeze  Cardiovascular: N S1S2; MASOOD present  Gastrointestinal: Abdomen soft, non tender, Bowel Sounds present, colostomy bag present  Extremities: No edema, peripheral pulses present  Neurological: AAO x 3, no gross focal motor deficits  Skin: Non cellulitic, no rash, ulcers  Lymph Nodes: No lymphadenopathy noted  Back: No CVA tenderness   Musculoskeletal: non tender  Breasts: Deferred  Genitourinary: deferred  Rectal: Deferred                          10.7   9.6   )-----------( 492      ( 10 Philip 2017 09:31 )             31.8       CBC Full  -  ( 10 Philip 2017 09:31 )  WBC Count : 9.6 K/uL  Hemoglobin : 10.7 g/dL  Hematocrit : 31.8 %  Platelet Count - Automated : 492 K/uL  Mean Cell Volume : 87.9 fl  Mean Cell Hemoglobin : 29.5 pg  Mean Cell Hemoglobin Concentration : 33.5 gm/dL  Auto Neutrophil # : x  Auto Lymphocyte # : x  Auto Monocyte # : x  Auto Eosinophil # : x  Auto Basophil # : x  Auto Neutrophil % : x  Auto Lymphocyte % : x  Auto Monocyte % : x  Auto Eosinophil % : x  Auto Basophil % : x      06-10    138  |  102  |  10  ----------------------------<  114<H>  4.3   |  31  |  0.90    Ca    8.0<L>      10 Philip 2017 09:31  Phos  3.0     06-10  Mg     2.1     06-10    TPro  7.5  /  Alb  1.7<L>  /  TBili  0.3  /  DBili  x   /  AST  27  /  ALT  31  /  AlkPhos  108  06-09      LIVER FUNCTIONS - ( 09 Jun 2017 10:21 )  Alb: 1.7 g/dL / Pro: 7.5 gm/dL / ALK PHOS: 108 U/L / ALT: 31 U/L / AST: 27 U/L / GGT: x                               MEDICATIONS  (STANDING):  heparin  Injectable 5000Unit(s) SubCutaneous every 8 hours  ALBUTerol/ipratropium for Nebulization 3milliLiter(s) Nebulizer every 6 hours  octreotide  Injectable 100MICROGram(s) SubCutaneous every 8 hours  meropenem IVPB 1000milliGRAM(s) IV Intermittent every 8 hours  fluconAZOLE   Tablet 100milliGRAM(s) Oral daily  Parenteral Nutrition - Adult 1Each TPN Continuous <Continuous>  Parenteral Nutrition - Adult 1Each TPN Continuous <Continuous>  fat emulsion 20% Infusion 50Gram(s) IV Continuous <Continuous>  pantoprazole  Injectable 40milliGRAM(s) IV Push daily

## 2017-06-10 NOTE — PROGRESS NOTE ADULT - ASSESSMENT
s/p Hartmans procedure for perforated diverticulitis as well as ileocolic resection for involvement in abscess with a controlled anastomotic leak    Continue bowel rest, antibiotics and TPN. Plan for repeat CT scan on Monday.

## 2017-06-10 NOTE — PROGRESS NOTE ADULT - SUBJECTIVE AND OBJECTIVE BOX
No acute events in past 24 hours. Denies pain, fevers or chills    Exam:  Vital Signs Last 24 Hrs  T(C): 36.8, Max: 37.2 (06-09 @ 21:54)  T(F): 98.3, Max: 98.9 (06-09 @ 21:54)  HR: 60 (60 - 64)  BP: 110/62 (107/65 - 111/68)  RR: 17 (15 - 18)  SpO2: 97% (97% - 100%)    General: alert, in no distress  Respiratory: non labored breathing on room air  Abdomen: soft, non tender, edema in right flank, drain with bilious drainage, incision stable without sign of infection. Colostomy healthy with flatus  Neuro: alert and oriented x 3    06-09    139  |  104  |  11  ----------------------------<  105<H>  4.3   |  28  |  0.89    Ca    8.3<L>      09 Jun 2017 10:21  Phos  3.0     06-09  Mg     2.3     06-09    TPro  7.5  /  Alb  1.7<L>  /  TBili  0.3  /  DBili  x   /  AST  27  /  ALT  31  /  AlkPhos  108  06-09    MEDICATIONS  (STANDING):  heparin  Injectable 5000Unit(s) SubCutaneous every 8 hours  ALBUTerol/ipratropium for Nebulization 3milliLiter(s) Nebulizer every 6 hours  octreotide  Injectable 100MICROGram(s) SubCutaneous every 8 hours  meropenem IVPB 1000milliGRAM(s) IV Intermittent every 8 hours  pantoprazole   Suspension 40milliGRAM(s) Oral daily  fluconAZOLE   Tablet 100milliGRAM(s) Oral daily  Parenteral Nutrition - Adult 1Each TPN Continuous <Continuous>

## 2017-06-11 RX ORDER — I.V. FAT EMULSION 20 G/100ML
50 EMULSION INTRAVENOUS
Qty: 0 | Refills: 0 | Status: DISCONTINUED | OUTPATIENT
Start: 2017-06-11 | End: 2017-06-11

## 2017-06-11 RX ORDER — ELECTROLYTE SOLUTION,INJ
1 VIAL (ML) INTRAVENOUS
Qty: 0 | Refills: 0 | Status: DISCONTINUED | OUTPATIENT
Start: 2017-06-11 | End: 2017-06-11

## 2017-06-11 RX ADMIN — HEPARIN SODIUM 5000 UNIT(S): 5000 INJECTION INTRAVENOUS; SUBCUTANEOUS at 13:41

## 2017-06-11 RX ADMIN — HEPARIN SODIUM 5000 UNIT(S): 5000 INJECTION INTRAVENOUS; SUBCUTANEOUS at 22:09

## 2017-06-11 RX ADMIN — OCTREOTIDE ACETATE 100 MICROGRAM(S): 200 INJECTION, SOLUTION INTRAVENOUS; SUBCUTANEOUS at 05:41

## 2017-06-11 RX ADMIN — OCTREOTIDE ACETATE 100 MICROGRAM(S): 200 INJECTION, SOLUTION INTRAVENOUS; SUBCUTANEOUS at 22:09

## 2017-06-11 RX ADMIN — MEROPENEM 200 MILLIGRAM(S): 1 INJECTION INTRAVENOUS at 05:40

## 2017-06-11 RX ADMIN — MEROPENEM 200 MILLIGRAM(S): 1 INJECTION INTRAVENOUS at 13:41

## 2017-06-11 RX ADMIN — I.V. FAT EMULSION 31.25 GRAM(S): 20 EMULSION INTRAVENOUS at 22:37

## 2017-06-11 RX ADMIN — HEPARIN SODIUM 5000 UNIT(S): 5000 INJECTION INTRAVENOUS; SUBCUTANEOUS at 05:41

## 2017-06-11 RX ADMIN — OCTREOTIDE ACETATE 100 MICROGRAM(S): 200 INJECTION, SOLUTION INTRAVENOUS; SUBCUTANEOUS at 13:42

## 2017-06-11 RX ADMIN — MEROPENEM 200 MILLIGRAM(S): 1 INJECTION INTRAVENOUS at 22:34

## 2017-06-11 RX ADMIN — FLUCONAZOLE 100 MILLIGRAM(S): 150 TABLET ORAL at 13:41

## 2017-06-11 RX ADMIN — PANTOPRAZOLE SODIUM 40 MILLIGRAM(S): 20 TABLET, DELAYED RELEASE ORAL at 13:42

## 2017-06-11 RX ADMIN — Medication 1 EACH: at 22:47

## 2017-06-11 NOTE — PROGRESS NOTE ADULT - SUBJECTIVE AND OBJECTIVE BOX
6/7: no complaints    6/8:  feeling better, no complaints    6/9: sitting conformably no complaints    6/10: no complaints    6/11: comfortable      PHYSICAL EXAM:    Daily     Daily     ICU Vital Signs Last 24 Hrs  T(C): 36.4, Max: 37.2 (06-11 @ 06:06)  T(F): 97.6, Max: 98.9 (06-11 @ 06:06)  HR: 63 (57 - 66)  BP: 105/58 (103/51 - 115/60)  BP(mean): --  ABP: --  ABP(mean): --  RR: 15 (15 - 17)  SpO2: 98% (96% - 98%)      Constitutional: Well appearing  HEENT: Atraumatic, MAHAMED, Normal, No congestion  Respiratory: Breath Sounds normal, no rhonchi/wheeze  Cardiovascular: N S1S2; MASOOD present  Gastrointestinal: Abdomen soft, non tender, Bowel Sounds present, colostomy bag present  Extremities: No edema, peripheral pulses present  Neurological: AAO x 3, no gross focal motor deficits  Skin: Non cellulitic, no rash, ulcers  Lymph Nodes: No lymphadenopathy noted  Back: No CVA tenderness   Musculoskeletal: non tender  Breasts: Deferred  Genitourinary: deferred  Rectal: Deferred                          10.7   9.6   )-----------( 492      ( 10 Philip 2017 09:31 )             31.8       CBC Full  -  ( 10 Philip 2017 09:31 )  WBC Count : 9.6 K/uL  Hemoglobin : 10.7 g/dL  Hematocrit : 31.8 %  Platelet Count - Automated : 492 K/uL  Mean Cell Volume : 87.9 fl  Mean Cell Hemoglobin : 29.5 pg  Mean Cell Hemoglobin Concentration : 33.5 gm/dL  Auto Neutrophil # : x  Auto Lymphocyte # : x  Auto Monocyte # : x  Auto Eosinophil # : x  Auto Basophil # : x  Auto Neutrophil % : x  Auto Lymphocyte % : x  Auto Monocyte % : x  Auto Eosinophil % : x  Auto Basophil % : x      06-10    138  |  102  |  10  ----------------------------<  114<H>  4.3   |  31  |  0.90    Ca    8.0<L>      10 Jun 2017 09:31  Phos  3.0     06-10  Mg     2.1     06-10                              MEDICATIONS  (STANDING):  heparin  Injectable 5000Unit(s) SubCutaneous every 8 hours  ALBUTerol/ipratropium for Nebulization 3milliLiter(s) Nebulizer every 6 hours  octreotide  Injectable 100MICROGram(s) SubCutaneous every 8 hours  meropenem IVPB 1000milliGRAM(s) IV Intermittent every 8 hours  fluconAZOLE   Tablet 100milliGRAM(s) Oral daily  Parenteral Nutrition - Adult 1Each TPN Continuous <Continuous>  fat emulsion 20% Infusion 50Gram(s) IV Continuous <Continuous>  pantoprazole  Injectable 40milliGRAM(s) IV Push daily  alteplase for catheter clearance 2milliGRAM(s) Catheter once  Parenteral Nutrition - Adult 1Each TPN Continuous <Continuous>  fat emulsion 20% Infusion 50Gram(s) IV Continuous <Continuous>

## 2017-06-11 NOTE — PROGRESS NOTE ADULT - SUBJECTIVE AND OBJECTIVE BOX
Stable, no new concerns.     Exam:  Vital Signs Last 24 Hrs  T(C): 37.2, Max: 37.2 (06-11 @ 06:06)  T(F): 98.9, Max: 98.9 (06-11 @ 06:06)  HR: 57 (57 - 66)  BP: 103/51 (103/51 - 115/60)  RR: 17 (16 - 18)  SpO2: 96% (96% - 99%)    General: alert, in no distress  Respiratory: non labored breathing on room air  Abdomen: soft, non tender, drain with stool output, incision stable without sign of infection. Colostomy healthy with flatus  Neuro: alert and oriented x 3                          10.7   9.6   )-----------( 492      ( 10 Philip 2017 09:31 )             31.8   06-10    138  |  102  |  10  ----------------------------<  114<H>  4.3   |  31  |  0.90    Ca    8.0<L>      10 Jun 2017 09:31  Phos  3.0     06-10  Mg     2.1     06-10

## 2017-06-11 NOTE — PROGRESS NOTE ADULT - ASSESSMENT
s/p Hartmans procedure for perforated diverticulitis as well as ileocolic resection for involvement in abscess with a controlled anastomotic leak    Continue bowel rest, antibiotics and TPN. Plan for repeat CT scan on tomorrow.

## 2017-06-11 NOTE — PROGRESS NOTE ADULT - ASSESSMENT
47M admitted for a 24hr h/o progressive abdominal pain that began after a 1-2 day trip to [facility]. CT with acute perforated sigmoid diverticulitis, s/p LAP with resection (Hartmanns/ileocolic resection) complicated by septic shock secondary to the above presently off pressor support. Rec'd large of INF for post fluid resuscitation now with anasarca      1)   Problem: s/p hartmans procedure for perforated diverticulitis as well as ileocolic resection with anastomosis and controlled anastomotic leak:  started on meropenem day 5 and diflucan day 4 as per ID   NPO, bowel rest,  TPN.  CCT-> perihepatic ascites/fluid collection; repeat CT abdo on Monday 6/12  Has rec's 14 days of abx.   Diflucan for yeast in culture    2) Problem: Anasarca.  Plan: resolved  stop diuretics  Ambulate    3) Problem: HARDIK (acute kidney injury).  Plan: 2/2 to ATN from septic shock-> both resolved.     poc discussed with pt, team

## 2017-06-12 PROCEDURE — 74177 CT ABD & PELVIS W/CONTRAST: CPT | Mod: 26

## 2017-06-12 RX ORDER — I.V. FAT EMULSION 20 G/100ML
50 EMULSION INTRAVENOUS ONCE
Qty: 0 | Refills: 0 | Status: COMPLETED | OUTPATIENT
Start: 2017-06-12 | End: 2017-06-12

## 2017-06-12 RX ORDER — ELECTROLYTE SOLUTION,INJ
1 VIAL (ML) INTRAVENOUS
Qty: 0 | Refills: 0 | Status: DISCONTINUED | OUTPATIENT
Start: 2017-06-12 | End: 2017-06-12

## 2017-06-12 RX ORDER — I.V. FAT EMULSION 20 G/100ML
20 EMULSION INTRAVENOUS
Qty: 0 | Refills: 0 | Status: DISCONTINUED | OUTPATIENT
Start: 2017-06-12 | End: 2017-06-12

## 2017-06-12 RX ADMIN — MEROPENEM 200 MILLIGRAM(S): 1 INJECTION INTRAVENOUS at 21:27

## 2017-06-12 RX ADMIN — OCTREOTIDE ACETATE 100 MICROGRAM(S): 200 INJECTION, SOLUTION INTRAVENOUS; SUBCUTANEOUS at 06:25

## 2017-06-12 RX ADMIN — OCTREOTIDE ACETATE 100 MICROGRAM(S): 200 INJECTION, SOLUTION INTRAVENOUS; SUBCUTANEOUS at 21:26

## 2017-06-12 RX ADMIN — HEPARIN SODIUM 5000 UNIT(S): 5000 INJECTION INTRAVENOUS; SUBCUTANEOUS at 21:27

## 2017-06-12 RX ADMIN — I.V. FAT EMULSION 31.25 GRAM(S): 20 EMULSION INTRAVENOUS at 22:08

## 2017-06-12 RX ADMIN — OCTREOTIDE ACETATE 100 MICROGRAM(S): 200 INJECTION, SOLUTION INTRAVENOUS; SUBCUTANEOUS at 13:11

## 2017-06-12 RX ADMIN — Medication 1 EACH: at 22:09

## 2017-06-12 RX ADMIN — HEPARIN SODIUM 5000 UNIT(S): 5000 INJECTION INTRAVENOUS; SUBCUTANEOUS at 06:24

## 2017-06-12 RX ADMIN — HEPARIN SODIUM 5000 UNIT(S): 5000 INJECTION INTRAVENOUS; SUBCUTANEOUS at 13:11

## 2017-06-12 RX ADMIN — FLUCONAZOLE 100 MILLIGRAM(S): 150 TABLET ORAL at 11:32

## 2017-06-12 RX ADMIN — MEROPENEM 200 MILLIGRAM(S): 1 INJECTION INTRAVENOUS at 06:24

## 2017-06-12 RX ADMIN — PANTOPRAZOLE SODIUM 40 MILLIGRAM(S): 20 TABLET, DELAYED RELEASE ORAL at 11:32

## 2017-06-12 RX ADMIN — MEROPENEM 200 MILLIGRAM(S): 1 INJECTION INTRAVENOUS at 13:11

## 2017-06-12 NOTE — PROGRESS NOTE ADULT - ASSESSMENT
47M admitted for a 24hr h/o progressive abdominal pain that began after a 1-2 day trip to [facility]. CT with acute perforated sigmoid diverticulitis, s/p LAP with resection (Hartmanns/ileocolic resection) complicated by septic shock secondary to the above presently off pressor support. Rec'd large of INF for post fluid resuscitation now with anasarca      1)   Problem: s/p hartmans procedure for perforated diverticulitis as well as ileocolic resection with anastomosis and controlled anastomotic leak:  started on meropenem day 5 and diflucan day 4 as per ID   NPO, bowel rest,  TPN.  CCT-> perihepatic ascites/fluid collection; repeat CT abdo on Monday 6/12 noted.   Has rec's 14 days of abx.   Diflucan for yeast in culture    2) Problem: Anasarca.  Plan: resolved  stop diuretics  Ambulate    3) Problem: HARDIK (acute kidney injury).  Plan: 2/2 to ATN from septic shock-> both resolved.     poc discussed with pt, team

## 2017-06-12 NOTE — PROGRESS NOTE ADULT - ASSESSMENT
47M with no significant past medical history admitted on 5/19 for evaluation of lower abdominal pain, decreased appetite and upon admission was found to have sigmoid diverticulitis; patient was medically managed however, on 5/21 patient underwent sigmoid resection, ileocolostomy and currently is post op asking for ice chips. He has decreased urine output and worsening renal function as well as hypotension  1. diverticulitis s/p sigmoid resection/ileostomy c/b sepsis & post-op collection s/p drainage now with persistent peritonitis/abd collection/anastomotic leak  - had IR drainage of fluid collection  -day #7 meroepnem  -day #6 diflucan given yeast in culture  - will not treat the panresistant E faecium, representative of bowel gregory  -drain in place  -to have repeat ct abdomen today  Will follow                  - afebrile currently and hd stable  - start IV meropenem after IR drainage 1gmq8h for gram (-) resistant/anaroebic coverage for gi tract  -previously completed course of vancomycin/diflucan/zosyn, antibiotics discontinued on 6/1  - tolerating antibiotics without rashes or side effects   - iv hydration and supportive care

## 2017-06-12 NOTE — PROGRESS NOTE ADULT - SUBJECTIVE AND OBJECTIVE BOX
HPI:  47M with no significant past medical history admitted on 5/19 for evaluation of lower abdominal pain, decreased appetite and upon admission was found to have sigmoid diverticulitis; patient was medically managed however, on 5/21 patient underwent sigmoid resection, ileocolostomy and currently is post op asking for ice chips. He has decreased urine output and worsening renal function as well as hypotension.  hospital course c/b septic shock/renal failure post-operatively, requiring pressor support/icu admission, found to have perihepatic collection s/p drainage on 5/30, noted to be clinically improving, but with low grade temps on 6/4  underwent repeat CT abd/pelvis 6/5 showing anastomotic leak/persistent peritonitis with new R abd collection  plan for IR drainage of collection 6/6 and PICC line for TPN   Today 6/7 patient comfortable, had dual lumen picc line placed, undergoing TPN  Today 6/8 patient feels good, no complaints, has aroma to stool that alerted nursing to check for cdiff  Today 6/9 patient without complaints, cdiff assy negative  Today 6/12 patient comfortable        MEDICATIONS  (STANDING):  heparin  Injectable 5000Unit(s) SubCutaneous every 8 hours  ALBUTerol/ipratropium for Nebulization 3milliLiter(s) Nebulizer every 6 hours  octreotide  Injectable 100MICROGram(s) SubCutaneous every 8 hours  meropenem IVPB 1000milliGRAM(s) IV Intermittent every 8 hours  fluconAZOLE   Tablet 100milliGRAM(s) Oral daily  pantoprazole  Injectable 40milliGRAM(s) IV Push daily  alteplase for catheter clearance 2milliGRAM(s) Catheter once  Parenteral Nutrition - Adult 1Each TPN Continuous <Continuous>  fat emulsion 20% Infusion 50Gram(s) IV Continuous <Continuous>  Parenteral Nutrition - Adult 1Each TPN Continuous <Continuous>  fat emulsion 20% IVPB 50Gram(s) IV Intermittent once    MEDICATIONS  (PRN):  naloxone Injectable 0.1milliGRAM(s) IV Push every 3 minutes PRN For ANY of the following changes in patient status:  A. RR LESS THAN 10 breaths per minute, B. Oxygen saturation LESS THAN 90%, C. Sedation score of 6  ondansetron Injectable 4milliGRAM(s) IV Push every 6 hours PRN Nausea  melatonin Oral Tab/Cap - Peds 3milliGRAM(s) Oral at bedtime PRN Insomnia      Vital Signs Last 24 Hrs  T(C): 36.9, Max: 37.1 (06-12 @ 05:58)  T(F): 98.5, Max: 98.7 (06-12 @ 05:58)  HR: 57 (57 - 73)  BP: 113/73 (103/55 - 115/67)  BP(mean): --  RR: 16 (16 - 17)  SpO2: 100% (95% - 100%)    Physical Exam:            Physical Exam:  Constitutional: well developed  HEENT: NC/AT, EOMI, PERRLA  Neck: supple  Respiratory: clear  Cardiovascular: S1S2 regular, no murmurs  Abdomen: kimmy drain in place, dressing in place left sided ostomy, drain in right quadrant with feculent material  Genitourinary: deferred  Rectal: deferred  Musculoskeletal: no muscle tenderness, no joint swelling or tenderness  Neurological: AxOx3, moving all extremities, no focal deficits  Skin: no rashes    Labs:                                   10.8   13.5  )-----------( 520      ( 06 Jun 2017 06:47 )             32.0     06-06    136  |  101  |  8   ----------------------------<  102<H>  4.5   |  29  |  0.90    Ca    8.3<L>      06 Jun 2017 06:47  Phos  3.9     06-06  Mg     2.3     06-06      Culture - Body Fluid with Gram Stain (06.06.17 @ 13:50)    -  Ampicillin: S 4    -  Cefazolin: S <=2    -  Ceftriaxone: S <=1    -  Ertapenem: S <=0.5    -  Gentamicin: S <=1    -  Piperacillin/Tazobactam: S <=8    -  Tetra/Doxy: R >8    -  Vancomycin: R >16    -  Ceftazidime: S <=1    -  Ciprofloxacin: S <=0.5    -  Levofloxacin: S <=1    -  Trimethoprim/Sulfamethoxazole: S <=0.5/9.5    -  Ampicillin: R >8    -  Aztreonam: S <=4    -  Cefepime: S <=2    -  Cefoxitin: S <=4    -  Meropenem: S <=1    -  Tobramycin: S 4    Gram Stain:   Few polymorphonuclear leukocytes per low power field  Moderate Gram Negative Rods per oil power field  Moderate Gram Positive Rods per oil power field  Few Gram Positive Cocci in Pairs and Chains per oil power field  Rare Yeast like cells per oil power field    -  Amikacin: S <=8    -  Ampicillin/Sulbactam: S <=4/2    -  Ciprofloxacin: R >2    -  Erythromycin: R >4    -  Imipenem: S <=1    Specimen Source: .Body Fluid Diverticular collection drainage    Culture Results:   Numerous Escherichia coli  Moderate Enterococcus faecium  Few Yeast  Rule Out Anaerobes    Organism Identification: Escherichia coli  Enterococcus faecium    Organism: Escherichia coli    Organism: Enterococcus faecium    Method Type: MADISON    Method Type: MADISON              Cultures: Culture - Blood (05.23.17 @ 11:20)    Gram Stain:   Growth in anaerobic bottle: Gram Positive Cocci in Clusters    Specimen Source: .Blood Blood    Culture Results:   Growth in anaerobic bottle: Coag Negative Staphylococcus  Single set isolate, possible contaminant. Contact  Microbiology if susceptibility testing clinically  indicated.    Culture - Body Fluid with Gram Stain (05.30.17 @ 16:30)    Gram Stain:   polymorphonuclear leukocytes seen  No organisms seen  by cytocentrifuge    Specimen Source: Abdominal Fl None    Culture Results:   No growth to date            Radiology:  EXAM:  CT ABDOMEN AND PELVIS OC IC                            PROCEDURE DATE:  06/05/2017        INTERPRETATION:  CT ABDOMEN AND PELVIS OC IC    HISTORY:  perforated diverticulitis s/p Hartmans,    Technique: CT of the abdomen and pelvis is performed with oral and   intravenous contrast. Axial images are supplemented with coronal and   sagittal reformations. This study was performed using automatic exposure   control (radiation dose reduction software) to obtain a diagnostic image   quality scan with patient dose as low as reasonably achievable.    Contrast: 90 cc Omnipaque 350    Comparison: CT abdomen and pelvis 5/29/2017    Findings:  LIVER: Normal.  SPLEEN: Normal.  PANCREAS: Normal.  GALLBLADDER/BILIARY TREE: Nondilated. Normal gallbladder.  ADRENALS: Stable right adrenal hemorrhage.  KIDNEYS: No calcification, hydronephrosis, or renal mass.  LYMPHADENOPATHY/RETROPERITONEUM: Prominent upper abdominal lymph nodes.  VASCULATURE: Normal caliber aorta.    BOWEL: Stable postsurgical anatomy after [employer] and ileocolic   anastomosis. Normal appearance of the rectal stump with an adjacent drain   in place. Unremarkable left lower quadrant colostomy. Oral contrast   material reaches the colostomy. No bowel obstruction.    PELVIC VISCERA: Unremarkable prostate, seminal vesicles, and urinary   bladder.  PELVIC LYMPH NODES: No pelvic adenopathy.    PERITONEUM/ABDOMINAL WALL: Persistent small pneumoperitoneum with new   droplets of gas on the left. Right perihepatic pigtail catheter is in   place. Increased thickening of the perihepatic and Morison's pouch   peritoneal lining indicative of peritonitis. Stable small perihepatic   fluid. Slightly increased pelvic free fluid.     There is interval development of an air, fluid, and oral contrast   collection at the level of the gallbladder fossa measuring 8.4 x 5.0 cm   (series 2 image 47). The oral contrast material is seen tracking from the   superior aspect of the ileocolic anastomosis.    SKELETAL: No acute bony abnormality.  LUNG BASES: Stable small right pleural effusion with right base   atelectasis. Decreased trace left pleural effusion.    IMPRESSION:     Definitive evidence of an anastomotic leak at the superior aspect of the   ileocolic anastomosis with air, fluid,and oral contrast material   tracking into the gallbladder fossa with the collection measuring 8.4 x   5.0 cm. Persistent pneumoperitoneum and free fluid. Right upper quadrant   drain in place. Thickening of the right-sided peritoneal lining   indicative of peritonitis.    These results were communicated to Dr. Boss by me over telephone at 1:45   PM on 6/5/2017.      EXAM:  CT ABDOMEN AND PELVIS IC                            PROCEDURE DATE:  05/19/2017        INTERPRETATION:  CLINICAL INFORMATION: 47-year-old man with abdominal   pain assess for appendicitis     TECHNIQUE:    CT of abdomen and pelvis was performed with axial images   were obtained from the diaphragm to pubic symphysis oral and IV contrast.    COMPARISON:  None.    FINDINGS:    Liver: Borderline hepatomegaly with mild fatty infiltration otherwise   within normal limits  Gallbladder: Within normal limits  Bile ducts: No intrahepatic or extrahepatic biliary dilatation  Pancreas: within normal limits    Spleen: within normal limits  Adrenal: within normal limits  Kidneys, Ureters and Bladder: Symmetric enhancement bilaterally. No   perinephric attending or collections. No hydronephrosis. No intrarenal   calculi. Normal caliber of the ureters. Limited unopacified nondistended   bladder.    Pelvis: No pelvic adenopathy or pelvic free fluid.  Small fat-containing   bilateral inguinal hernias.      Bowel: No bowel obstruction.  There are few scattered colonic   diverticula. There is focal thickening of sigmoid colon in the pelvis   associated with mesenteric fat stranding and thickening of adjacent   fascial planes with localized perforation and small air bubbles without   abscess. Findings are consistent with acute rupture diverticulitis. Small   free fluid adjacent to gas filled appendix.    Peritoneum: Small ascites, no organized fluid collections.    Retroperitoneum: within normal limits  Vessels: Patent.    Abdominal wall: Small fat-containing umbilical hernia.    Lower chest and lung Bases: The visualized lung bases clear except for   subsegmental dependent atelectasis. Heart normal in size.   Bones: Degenerative changes.     IMPRESSION:     Focal thickening of sigmoid colon with mesenteric fat stranding and   thickening of fascial planes with scattered adjacent air bubbles   consistent with acute diverticulitis without abscess. Small free fluid in   the abdomen extending to the right quadrant.    EXAM:  CT ABDOMEN AND PELVIS OC IC                            PROCEDURE DATE:  05/29/2017        INTERPRETATION:  CT OF THE ABDOMEN AND PELVIS WITH IV CONTRAST     CLINICAL INDICATION: diverticulitis, sepsis s/p hartmans    TECHNIQUE: CT scan of the abdomen and pelvis was performed from the domes   of the diaphragm to the symphysis pubis with oral and intravenous   contrast.  Intravenous administration of 90 cc of Omnipaque-350, 10 cc   discarded, was without complication.  Sagittal and coronalreformatted   images were provided.    COMPARISON: CT abdomen pelvis May 19, 2017    FINDINGS:   LOWER THORAX: Moderate bilateral pleural effusions with underlying   compressive atelectasis..    LIVER:  Moderate perihepatic ascites and adjacent free air..  GALLBLADDER: Unremarkable.  BILIARY TREE: Unremarkable.  PANCREAS: Unremarkable.  SPLEEN: Unremarkable.  ADRENALS: There is new expansion of the right adrenal gland with   nonsimple fluid/material, measuring approximately 4.0 x 2.6 cm, may   reflect hemorrhage within the right adrenal gland. Left adrenal gland   appears unremarkable.    KIDNEYS/URETERS: Unremarkable.    BOWEL: Status post sigmoid resection with left lower quadrant colostomy   present and suture material at the rectal stump.Additionally, partial   resection and anastomosis in the region of the cecum is present. There is   peripheral luminal air within the ascending bowel wall and cecum. There   is a surgical drainage catheter with tip in the mid pelvis. No focal   fluidcollection or abscess.    PERITONEUM/RETROPERITONEUM: Extensive free air in the upper abdomen,   compatible with recent surgery. Moderate perihepatic ascites and minimal   fluid in the bilateral paracolic gutters is present.    BLADDER: Small amount of air in the bladder, likely reflecting recent   Cooley catheterization.  REPRODUCTIVE ORGANS: Unremarkable.    VASCULATURE: Within normal limits.  ABDOMINAL WALL: Postsurgical changes within the abdominal wall are   present. Diffuse edema of the abdominal wall. Midline surgical staples   present.    BONES: No aggressive osseous lesion.    IMPRESSION:    Status post sigmoid resection with left lower quadrant colostomy present   and suture material at the rectal stump. Additionally, partial resection   and anastomosis in the region of the cecum is present. There is   peripheral luminal air within the ascending bowel wall and cecum. There   is a surgical drainage catheter with tip in the mid pelvis. No focal   fluid collection or abscess. Moderate perihepatic ascites and minimal   fluid in the bilateral paracolic gutters is present.    New expansion of the right adrenal gland with hyperattenuating material,   measuring approximately 4.0 x 2.6 cm, may reflect hemorrhage within the   right adrenal gland.     Moderate bilateral pleural effusions with underlying compressive   atelectasis..    Rest of the findings as above.    Advanced directive addressed: full resuscitation

## 2017-06-12 NOTE — PROGRESS NOTE ADULT - SUBJECTIVE AND OBJECTIVE BOX
6/7: no complaints    6/8:  feeling better, no complaints    6/9: sitting conformably no complaints    6/10: no complaints    6/11: comfortable      PHYSICAL EXAM:    Daily     Daily     ICU Vital Signs Last 24 Hrs  T(C): 36.9, Max: 37.1 (06-12 @ 05:58)  T(F): 98.5, Max: 98.7 (06-12 @ 05:58)  HR: 57 (57 - 73)  BP: 113/73 (103/55 - 115/67)  BP(mean): --  ABP: --  ABP(mean): --  RR: 16 (16 - 17)  SpO2: 100% (95% - 100%)      Constitutional: Well appearing  HEENT: Atraumatic, MAHAMED, Normal, No congestion  Respiratory: Breath Sounds normal, no rhonchi/wheeze  Cardiovascular: N S1S2; MASOOD present  Gastrointestinal: Abdomen soft, non tender, Bowel Sounds present  Extremities: No edema, peripheral pulses present  Neurological: AAO x 3, no gross focal motor deficits  Skin: Non cellulitic, no rash, ulcers  Lymph Nodes: No lymphadenopathy noted  Back: No CVA tenderness   Musculoskeletal: non tender  Breasts: Deferred  Genitourinary: deferred  Rectal: Deferred                                          MEDICATIONS  (STANDING):  heparin  Injectable 5000Unit(s) SubCutaneous every 8 hours  ALBUTerol/ipratropium for Nebulization 3milliLiter(s) Nebulizer every 6 hours  octreotide  Injectable 100MICROGram(s) SubCutaneous every 8 hours  meropenem IVPB 1000milliGRAM(s) IV Intermittent every 8 hours  fluconAZOLE   Tablet 100milliGRAM(s) Oral daily  pantoprazole  Injectable 40milliGRAM(s) IV Push daily  alteplase for catheter clearance 2milliGRAM(s) Catheter once  Parenteral Nutrition - Adult 1Each TPN Continuous <Continuous>  fat emulsion 20% Infusion 50Gram(s) IV Continuous <Continuous>  Parenteral Nutrition - Adult 1Each TPN Continuous <Continuous>  fat emulsion 20% IVPB 50Gram(s) IV Intermittent once

## 2017-06-12 NOTE — PROGRESS NOTE ADULT - SUBJECTIVE AND OBJECTIVE BOX
No c/o. Feels well.    MEDICATIONS  (STANDING):  heparin  Injectable 5000Unit(s) SubCutaneous every 8 hours  ALBUTerol/ipratropium for Nebulization 3milliLiter(s) Nebulizer every 6 hours  octreotide  Injectable 100MICROGram(s) SubCutaneous every 8 hours  meropenem IVPB 1000milliGRAM(s) IV Intermittent every 8 hours  fluconAZOLE   Tablet 100milliGRAM(s) Oral daily  pantoprazole  Injectable 40milliGRAM(s) IV Push daily  alteplase for catheter clearance 2milliGRAM(s) Catheter once  Parenteral Nutrition - Adult 1Each TPN Continuous <Continuous>  fat emulsion 20% Infusion 50Gram(s) IV Continuous <Continuous>  Parenteral Nutrition - Adult 1Each TPN Continuous <Continuous>  fat emulsion 20% Infusion 20Gram(s) IV Continuous <Continuous>    MEDICATIONS  (PRN):  naloxone Injectable 0.1milliGRAM(s) IV Push every 3 minutes PRN For ANY of the following changes in patient status:  A. RR LESS THAN 10 breaths per minute, B. Oxygen saturation LESS THAN 90%, C. Sedation score of 6  ondansetron Injectable 4milliGRAM(s) IV Push every 6 hours PRN Nausea  melatonin Oral Tab/Cap - Peds 3milliGRAM(s) Oral at bedtime PRN Insomnia    Vital Signs Last 24 Hrs  T(C): 37.1, Max: 37.1 (06-12 @ 05:58)  T(F): 98.7, Max: 98.7 (06-12 @ 05:58)  HR: 60 (60 - 73)  BP: 110/64 (103/55 - 115/67)  BP(mean): --  RR: 16 (15 - 17)  SpO2: 97% (95% - 98%)  I&O's Detail    I & Os for current day (as of 12 Jun 2017 08:22)  =============================================  IN:    Fat Emulsion 20%: 250 ml    IV PiggyBack: 100 ml    Total IN: 350 ml  ---------------------------------------------  OUT:    Voided: 2000 ml    Colostomy: 300 ml    T-Tube: 25 ml feculent    Total OUT: 2325 ml  ---------------------------------------------  Total NET: -1975 ml    ABD exam :incision CDI, colostomy in place, soft, NTND                        10.7   9.6   )-----------( 492      ( 10 Philip 2017 09:31 )             31.8     06-10    138  |  102  |  10  ----------------------------<  114<H>  4.3   |  31  |  0.90    Ca    8.0<L>      10 Philip 2017 09:31  Phos  3.0     06-10  Mg     2.1     06-10    A/P - s/p Hartmanns with ileocolic resection, contained ileocolic leak    Followup CT A/P to assess fistula tract and to identify if any areas of undrained abscess.  If tract is well defined and all areas of abscess are drained by pigtail, will begin d/c planning with home TPN. Dr. Sneed aware.  Will need to discuss with ID regarding once daily dosing for abx.   Long term plan of care for patient reviewed.

## 2017-06-13 DIAGNOSIS — G56.22 LESION OF ULNAR NERVE, LEFT UPPER LIMB: ICD-10-CM

## 2017-06-13 LAB
ANION GAP SERPL CALC-SCNC: 7 MMOL/L — SIGNIFICANT CHANGE UP (ref 5–17)
BUN SERPL-MCNC: 15 MG/DL — SIGNIFICANT CHANGE UP (ref 7–23)
CALCIUM SERPL-MCNC: 8.3 MG/DL — LOW (ref 8.5–10.1)
CHLORIDE SERPL-SCNC: 102 MMOL/L — SIGNIFICANT CHANGE UP (ref 96–108)
CO2 SERPL-SCNC: 30 MMOL/L — SIGNIFICANT CHANGE UP (ref 22–31)
CREAT SERPL-MCNC: 0.85 MG/DL — SIGNIFICANT CHANGE UP (ref 0.5–1.3)
GLUCOSE SERPL-MCNC: 116 MG/DL — HIGH (ref 70–99)
MAGNESIUM SERPL-MCNC: 2.2 MG/DL — SIGNIFICANT CHANGE UP (ref 1.6–2.6)
PHOSPHATE SERPL-MCNC: 3.4 MG/DL — SIGNIFICANT CHANGE UP (ref 2.5–4.5)
POTASSIUM SERPL-MCNC: 4.5 MMOL/L — SIGNIFICANT CHANGE UP (ref 3.5–5.3)
POTASSIUM SERPL-SCNC: 4.5 MMOL/L — SIGNIFICANT CHANGE UP (ref 3.5–5.3)
SODIUM SERPL-SCNC: 139 MMOL/L — SIGNIFICANT CHANGE UP (ref 135–145)

## 2017-06-13 RX ORDER — OCTREOTIDE ACETATE 200 UG/ML
200 INJECTION, SOLUTION INTRAVENOUS; SUBCUTANEOUS EVERY 8 HOURS
Qty: 0 | Refills: 0 | Status: DISCONTINUED | OUTPATIENT
Start: 2017-06-13 | End: 2017-06-16

## 2017-06-13 RX ORDER — ERTAPENEM SODIUM 1 G/1
1000 INJECTION, POWDER, LYOPHILIZED, FOR SOLUTION INTRAMUSCULAR; INTRAVENOUS EVERY 24 HOURS
Qty: 0 | Refills: 0 | Status: DISCONTINUED | OUTPATIENT
Start: 2017-06-13 | End: 2017-06-16

## 2017-06-13 RX ORDER — I.V. FAT EMULSION 20 G/100ML
250 EMULSION INTRAVENOUS ONCE
Qty: 0 | Refills: 0 | Status: COMPLETED | OUTPATIENT
Start: 2017-06-13 | End: 2017-06-13

## 2017-06-13 RX ORDER — ELECTROLYTE SOLUTION,INJ
1 VIAL (ML) INTRAVENOUS
Qty: 0 | Refills: 0 | Status: DISCONTINUED | OUTPATIENT
Start: 2017-06-13 | End: 2017-06-13

## 2017-06-13 RX ORDER — I.V. FAT EMULSION 20 G/100ML
50 EMULSION INTRAVENOUS
Qty: 0 | Refills: 0 | Status: DISCONTINUED | OUTPATIENT
Start: 2017-06-13 | End: 2017-06-13

## 2017-06-13 RX ADMIN — HEPARIN SODIUM 5000 UNIT(S): 5000 INJECTION INTRAVENOUS; SUBCUTANEOUS at 15:42

## 2017-06-13 RX ADMIN — OCTREOTIDE ACETATE 100 MICROGRAM(S): 200 INJECTION, SOLUTION INTRAVENOUS; SUBCUTANEOUS at 06:43

## 2017-06-13 RX ADMIN — HEPARIN SODIUM 5000 UNIT(S): 5000 INJECTION INTRAVENOUS; SUBCUTANEOUS at 22:03

## 2017-06-13 RX ADMIN — OCTREOTIDE ACETATE 200 MICROGRAM(S): 200 INJECTION, SOLUTION INTRAVENOUS; SUBCUTANEOUS at 23:39

## 2017-06-13 RX ADMIN — I.V. FAT EMULSION 31.25 MILLILITER(S): 20 EMULSION INTRAVENOUS at 22:03

## 2017-06-13 RX ADMIN — ERTAPENEM SODIUM 120 MILLIGRAM(S): 1 INJECTION, POWDER, LYOPHILIZED, FOR SOLUTION INTRAMUSCULAR; INTRAVENOUS at 12:27

## 2017-06-13 RX ADMIN — OCTREOTIDE ACETATE 200 MICROGRAM(S): 200 INJECTION, SOLUTION INTRAVENOUS; SUBCUTANEOUS at 15:42

## 2017-06-13 RX ADMIN — FLUCONAZOLE 100 MILLIGRAM(S): 150 TABLET ORAL at 12:27

## 2017-06-13 RX ADMIN — MEROPENEM 200 MILLIGRAM(S): 1 INJECTION INTRAVENOUS at 06:12

## 2017-06-13 RX ADMIN — PANTOPRAZOLE SODIUM 40 MILLIGRAM(S): 20 TABLET, DELAYED RELEASE ORAL at 12:26

## 2017-06-13 RX ADMIN — Medication 1 EACH: at 22:03

## 2017-06-13 RX ADMIN — HEPARIN SODIUM 5000 UNIT(S): 5000 INJECTION INTRAVENOUS; SUBCUTANEOUS at 06:43

## 2017-06-13 NOTE — CONSULT NOTE ADULT - ASSESSMENT
47 year old with diverticulitis. c/o left hand numbness, on exam suggestive of left ulnar neuropraxia. Likely secondary to pressure at the ulnar groove. Doubt a radicular component

## 2017-06-13 NOTE — PROGRESS NOTE ADULT - ASSESSMENT
47M admitted for a 24hr h/o progressive abdominal pain that began after a 1-2 day trip to [facility]. CT with acute perforated sigmoid diverticulitis, s/p LAP with resection (Hartmanns/ileocolic resection) complicated by septic shock secondary to the above presently off pressor support. Rec'd large of INF for post fluid resuscitation now with anasarca      1)   Problem: s/p hartmans procedure for perforated diverticulitis as well as ileocolic resection with anastomosis and controlled anastomotic leak:  change to ertrapenem day 8 and diflucan day 7 as per ID, cont ABX till 6/27   NPO, bowel rest,  TPN.  CCT-> perihepatic ascites/fluid collection; repeat CT abdo on Monday 6/12  Has rec's 14 days of abx.   Diflucan for yeast in culture    2) Problem: Anasarca.  Plan: resolved  stop diuretics  Ambulate    3) Problem: HARDIK (acute kidney injury).  Plan: 2/2 to ATN from septic shock-> both resolved.     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 Doubt a radicular component Recommendation: avoid putting pressure on the left elbow. If symptoms persist, outpatient f/u with Dr. Gallegos in 2-3 weeks.    poc discussed with pt, team

## 2017-06-13 NOTE — CONSULT NOTE ADULT - SUBJECTIVE AND OBJECTIVE BOX
Neurology Consult requested by:   Patient is a 47y old  Male who presents with a chief complaint of Sigmoid diverticulitis (19 May 2017 18:07)     HPI:  47M admitted for a 24hr h/o progressive abdominal pain, CT with acute sigmoid diverticulitis. c/o numbness of the left medial hand. No pain, denies radiating neck pain.    PAST MEDICAL & SURGICAL HISTORY:  Fatty liver  No significant past surgical history    FAMILY HISTORY:  No pertinent family history in first degree relatives    Social Hx:  Nonsmoker, no drug or alcohol use  Medications and Allergies ReviewedMEDICATIONS  (STANDING):  heparin  Injectable 5000Unit(s) SubCutaneous every 8 hours  ALBUTerol/ipratropium for Nebulization 3milliLiter(s) Nebulizer every 6 hours  fluconAZOLE   Tablet 100milliGRAM(s) Oral daily  pantoprazole  Injectable 40milliGRAM(s) IV Push daily  alteplase for catheter clearance 2milliGRAM(s) Catheter once  Parenteral Nutrition - Adult 1Each TPN Continuous <Continuous>  octreotide  Injectable 200MICROGram(s) SubCutaneous every 8 hours  ertapenem  IVPB 1000milliGRAM(s) IV Intermittent every 24 hours  Parenteral Nutrition - Adult 1Each TPN Continuous <Continuous>  fat emulsion 20% Infusion 50Gram(s) IV Continuous <Continuous>     ROS: Pertinent positives in HPI, all other ROS were reviewed and are negative.      Examination:   Vital Signs Last 24 Hrs  T(C): 37.1, Max: 37.1 (06-12 @ 17:20)  T(F): 98.8, Max: 98.8 (06-12 @ 17:20)  HR: 64 (57 - 87)  BP: 114/69 (111/66 - 114/74)  BP(mean): --  RR: 18 (18 - 18)  SpO2: 99% (98% - 100%)  General: Cooperative, NAD   NECK: supple, no masses  ENT: Normal hearing   Vascular : no carotid bruits,   Lungs: CTAB  Chest: RRR, no murmurs  Extremities: nontender, no edema  Musculoskeletal: no adventitious movements, no joint stiffness  Skin: no rash    Neurological Examination:  NIHSS:  MS: AOx3. Appropriately interactive, normal affect. Speech fluent w/o paraphasic error, repetition, naming, reading is intact   CN: No Papilledema, PERLL, EOMI, V1-3 sensation intact, face symmetric, hearing intact, palate elevates symmetrically, tongue midline, SCM equal bilaterally  Motor: normal bulk and tone, no tremor, rigidity or bradykinesia.  5/5 all over   Sens: Intact to light touch, mild hypesthesia of the left ulnar hand..    Reflexes: 2/4 all over, downgoing toes b/l  Coord:  No dysmetria, ISAÍAS intact   Gait: not tested    Labs:   Basic Metabolic Panel in AM (06.13.17 @ 07:21)    Sodium, Serum: 139 mmol/L    Potassium, Serum: 4.5 mmol/L    Chloride, Serum: 102 mmol/L    Carbon Dioxide, Serum: 30 mmol/L    Anion Gap, Serum: 7 mmol/L    Blood Urea Nitrogen, Serum: 15 mg/dL    Creatinine, Serum: 0.85 mg/dL    Glucose, Serum: 116 mg/dL    Calcium, Total Serum: 8.3 mg/dL    eGFR if Non : 104: Interpretative comment

## 2017-06-13 NOTE — PROGRESS NOTE ADULT - ASSESSMENT
Imp:  abdominal pain, resolved  Doubt pancreatitis -- MRI negative  Renal lesion, likely a scar    Rec:  Advance diet  d/c plan  GI follow up encouraged

## 2017-06-13 NOTE — PROGRESS NOTE ADULT - SUBJECTIVE AND OBJECTIVE BOX
6/7: no complaints    6/8:  feeling better, no complaints    6/9: sitting conformably no complaints    6/10: no complaints    6/11: comfortable    6/12:  c/o numbness on hypothenar aspect of left hand.      PHYSICAL EXAM:    Daily     Daily     ICU Vital Signs Last 24 Hrs  T(C): 37.1, Max: 37.1 (06-12 @ 17:20)  T(F): 98.8, Max: 98.8 (06-12 @ 17:20)  HR: 64 (57 - 87)  BP: 114/69 (111/66 - 114/74)  BP(mean): --  ABP: --  ABP(mean): --  RR: 18 (18 - 18)  SpO2: 99% (98% - 100%)      Constitutional: Well appearing  HEENT: Atraumatic, MAHAMED, Normal, No congestion  Respiratory: Breath Sounds normal, no rhonchi/wheeze  Cardiovascular: N S1S2; MASOOD present  Gastrointestinal: Abdomen soft, non tender, Bowel Sounds present  Extremities: No edema, peripheral pulses present  Neurological: AAO x 3, no gross focal motor deficits, minimal numbness over hypothenar aspect of left hand  Skin: Non cellulitic, no rash, ulcers  Lymph Nodes: No lymphadenopathy noted  Back: No CVA tenderness   Musculoskeletal: non tender  Breasts: Deferred  Genitourinary: deferred  Rectal: Deferred            06-13    139  |  102  |  15  ----------------------------<  116<H>  4.5   |  30  |  0.85    Ca    8.3<L>      13 Jun 2017 07:21  Phos  3.4     06-13  Mg     2.2     06-13                              MEDICATIONS  (STANDING):  heparin  Injectable 5000Unit(s) SubCutaneous every 8 hours  ALBUTerol/ipratropium for Nebulization 3milliLiter(s) Nebulizer every 6 hours  fluconAZOLE   Tablet 100milliGRAM(s) Oral daily  pantoprazole  Injectable 40milliGRAM(s) IV Push daily  alteplase for catheter clearance 2milliGRAM(s) Catheter once  Parenteral Nutrition - Adult 1Each TPN Continuous <Continuous>  octreotide  Injectable 200MICROGram(s) SubCutaneous every 8 hours  ertapenem  IVPB 1000milliGRAM(s) IV Intermittent every 24 hours  Parenteral Nutrition - Adult 1Each TPN Continuous <Continuous>  fat emulsion 20% IVPB 250milliLiter(s) IV Intermittent once

## 2017-06-13 NOTE — PROGRESS NOTE ADULT - SUBJECTIVE AND OBJECTIVE BOX
HPI:  47M with no significant past medical history admitted on 5/19 for evaluation of lower abdominal pain, decreased appetite and upon admission was found to have sigmoid diverticulitis; patient was medically managed however, on 5/21 patient underwent sigmoid resection, ileocolostomy and currently is post op asking for ice chips. He has decreased urine output and worsening renal function as well as hypotension.  hospital course c/b septic shock/renal failure post-operatively, requiring pressor support/icu admission, found to have perihepatic collection s/p drainage on 5/30, noted to be clinically improving, but with low grade temps on 6/4  underwent repeat CT abd/pelvis 6/5 showing anastomotic leak/persistent peritonitis with new R abd collection  plan for IR drainage of collection 6/6 and PICC line for TPN   Today 6/7 patient comfortable, had dual lumen picc line placed, undergoing TPN  Today 6/8 patient feels good, no complaints, has aroma to stool that alerted nursing to check for cdiff  Today 6/9 patient without complaints, cdiff assy negative  Today 6/12 patient comfortable  Today 6/13 patient without complaints, discharge planning underway        MEDICATIONS  (STANDING):  heparin  Injectable 5000Unit(s) SubCutaneous every 8 hours  ALBUTerol/ipratropium for Nebulization 3milliLiter(s) Nebulizer every 6 hours  fluconAZOLE   Tablet 100milliGRAM(s) Oral daily  pantoprazole  Injectable 40milliGRAM(s) IV Push daily  alteplase for catheter clearance 2milliGRAM(s) Catheter once  Parenteral Nutrition - Adult 1Each TPN Continuous <Continuous>  octreotide  Injectable 200MICROGram(s) SubCutaneous every 8 hours  ertapenem  IVPB 1000milliGRAM(s) IV Intermittent every 24 hours    MEDICATIONS  (PRN):  naloxone Injectable 0.1milliGRAM(s) IV Push every 3 minutes PRN For ANY of the following changes in patient status:  A. RR LESS THAN 10 breaths per minute, B. Oxygen saturation LESS THAN 90%, C. Sedation score of 6  ondansetron Injectable 4milliGRAM(s) IV Push every 6 hours PRN Nausea  melatonin Oral Tab/Cap - Peds 3milliGRAM(s) Oral at bedtime PRN Insomnia      Vital Signs Last 24 Hrs  T(C): 37, Max: 37.1 (06-12 @ 17:20)  T(F): 98.6, Max: 98.8 (06-12 @ 17:20)  HR: 61 (57 - 87)  BP: 114/74 (111/66 - 114/74)  BP(mean): --  RR: 18 (16 - 18)  SpO2: 99% (98% - 100%)    Physical Exam:              Physical Exam:  Constitutional: well developed  HEENT: NC/AT, EOMI, PERRLA  Neck: supple  Respiratory: clear  Cardiovascular: S1S2 regular, no murmurs  Abdomen:  dressing in place left sided ostomy, drain in right quadrant with feculent material  Genitourinary: deferred  Rectal: deferred  Musculoskeletal: no muscle tenderness, no joint swelling or tenderness  Neurological: AxOx3, moving all extremities, no focal deficits  Skin: no rashes    Labs:        Labs:    06-13    139  |  102  |  15  ----------------------------<  116<H>  4.5   |  30  |  0.85    Ca    8.3<L>      13 Jun 2017 07:21  Phos  3.4     06-13  Mg     2.2     06-13             Cultures:                                    10.8   13.5  )-----------( 520      ( 06 Jun 2017 06:47 )             32.0     06-06    136  |  101  |  8   ----------------------------<  102<H>  4.5   |  29  |  0.90    Ca    8.3<L>      06 Jun 2017 06:47  Phos  3.9     06-06  Mg     2.3     06-06      Culture - Body Fluid with Gram Stain (06.06.17 @ 13:50)    -  Ampicillin: S 4    -  Cefazolin: S <=2    -  Ceftriaxone: S <=1    -  Ertapenem: S <=0.5    -  Gentamicin: S <=1    -  Piperacillin/Tazobactam: S <=8    -  Tetra/Doxy: R >8    -  Vancomycin: R >16    -  Ceftazidime: S <=1    -  Ciprofloxacin: S <=0.5    -  Levofloxacin: S <=1    -  Trimethoprim/Sulfamethoxazole: S <=0.5/9.5    -  Ampicillin: R >8    -  Aztreonam: S <=4    -  Cefepime: S <=2    -  Cefoxitin: S <=4    -  Meropenem: S <=1    -  Tobramycin: S 4    Gram Stain:   Few polymorphonuclear leukocytes per low power field  Moderate Gram Negative Rods per oil power field  Moderate Gram Positive Rods per oil power field  Few Gram Positive Cocci in Pairs and Chains per oil power field  Rare Yeast like cells per oil power field    -  Amikacin: S <=8    -  Ampicillin/Sulbactam: S <=4/2    -  Ciprofloxacin: R >2    -  Erythromycin: R >4    -  Imipenem: S <=1    Specimen Source: .Body Fluid Diverticular collection drainage    Culture Results:   Numerous Escherichia coli  Moderate Enterococcus faecium  Few Yeast  Rule Out Anaerobes    Organism Identification: Escherichia coli  Enterococcus faecium    Organism: Escherichia coli    Organism: Enterococcus faecium    Method Type: MADISON    Method Type: MADISON              Cultures: Culture - Blood (05.23.17 @ 11:20)    Gram Stain:   Growth in anaerobic bottle: Gram Positive Cocci in Clusters    Specimen Source: .Blood Blood    Culture Results:   Growth in anaerobic bottle: Coag Negative Staphylococcus  Single set isolate, possible contaminant. Contact  Microbiology if susceptibility testing clinically  indicated.    Culture - Body Fluid with Gram Stain (05.30.17 @ 16:30)    Gram Stain:   polymorphonuclear leukocytes seen  No organisms seen  by cytocentrifuge    Specimen Source: Abdominal Fl None    Culture Results:   No growth to date            Radiology:  EXAM:  CT ABDOMEN AND PELVIS OC IC                            PROCEDURE DATE:  06/05/2017        INTERPRETATION:  CT ABDOMEN AND PELVIS OC IC    HISTORY:  perforated diverticulitis s/p Hartmans,    Technique: CT of the abdomen and pelvis is performed with oral and   intravenous contrast. Axial images are supplemented with coronal and   sagittal reformations. This study was performed using automatic exposure   control (radiation dose reduction software) to obtain a diagnostic image   quality scan with patient dose as low as reasonably achievable.    Contrast: 90 cc Omnipaque 350    Comparison: CT abdomen and pelvis 5/29/2017    Findings:  LIVER: Normal.  SPLEEN: Normal.  PANCREAS: Normal.  GALLBLADDER/BILIARY TREE: Nondilated. Normal gallbladder.  ADRENALS: Stable right adrenal hemorrhage.  KIDNEYS: No calcification, hydronephrosis, or renal mass.  LYMPHADENOPATHY/RETROPERITONEUM: Prominent upper abdominal lymph nodes.  VASCULATURE: Normal caliber aorta.    BOWEL: Stable postsurgical anatomy after [employer] and ileocolic   anastomosis. Normal appearance of the rectal stump with an adjacent drain   in place. Unremarkable left lower quadrant colostomy. Oral contrast   material reaches the colostomy. No bowel obstruction.    PELVIC VISCERA: Unremarkable prostate, seminal vesicles, and urinary   bladder.  PELVIC LYMPH NODES: No pelvic adenopathy.    PERITONEUM/ABDOMINAL WALL: Persistent small pneumoperitoneum with new   droplets of gas on the left. Right perihepatic pigtail catheter is in   place. Increased thickening of the perihepatic and Morison's pouch   peritoneal lining indicative of peritonitis. Stable small perihepatic   fluid. Slightly increased pelvic free fluid.     There is interval development of an air, fluid, and oral contrast   collection at the level of the gallbladder fossa measuring 8.4 x 5.0 cm   (series 2 image 47). The oral contrast material is seen tracking from the   superior aspect of the ileocolic anastomosis.    SKELETAL: No acute bony abnormality.  LUNG BASES: Stable small right pleural effusion with right base   atelectasis. Decreased trace left pleural effusion.    IMPRESSION:     Definitive evidence of an anastomotic leak at the superior aspect of the   ileocolic anastomosis with air, fluid,and oral contrast material   tracking into the gallbladder fossa with the collection measuring 8.4 x   5.0 cm. Persistent pneumoperitoneum and free fluid. Right upper quadrant   drain in place. Thickening of the right-sided peritoneal lining   indicative of peritonitis.    These results were communicated to Dr. Boss by me over telephone at 1:45   PM on 6/5/2017.      EXAM:  CT ABDOMEN AND PELVIS IC                            PROCEDURE DATE:  05/19/2017        INTERPRETATION:  CLINICAL INFORMATION: 47-year-old man with abdominal   pain assess for appendicitis     TECHNIQUE:    CT of abdomen and pelvis was performed with axial images   were obtained from the diaphragm to pubic symphysis oral and IV contrast.    COMPARISON:  None.    FINDINGS:    Liver: Borderline hepatomegaly with mild fatty infiltration otherwise   within normal limits  Gallbladder: Within normal limits  Bile ducts: No intrahepatic or extrahepatic biliary dilatation  Pancreas: within normal limits    Spleen: within normal limits  Adrenal: within normal limits  Kidneys, Ureters and Bladder: Symmetric enhancement bilaterally. No   perinephric attending or collections. No hydronephrosis. No intrarenal   calculi. Normal caliber of the ureters. Limited unopacified nondistended   bladder.    Pelvis: No pelvic adenopathy or pelvic free fluid.  Small fat-containing   bilateral inguinal hernias.      Bowel: No bowel obstruction.  There are few scattered colonic   diverticula. There is focal thickening of sigmoid colon in the pelvis   associated with mesenteric fat stranding and thickening of adjacent   fascial planes with localized perforation and small air bubbles without   abscess. Findings are consistent with acute rupture diverticulitis. Small   free fluid adjacent to gas filled appendix.    Peritoneum: Small ascites, no organized fluid collections.    Retroperitoneum: within normal limits  Vessels: Patent.    Abdominal wall: Small fat-containing umbilical hernia.    Lower chest and lung Bases: The visualized lung bases clear except for   subsegmental dependent atelectasis. Heart normal in size.   Bones: Degenerative changes.     IMPRESSION:     Focal thickening of sigmoid colon with mesenteric fat stranding and   thickening of fascial planes with scattered adjacent air bubbles   consistent with acute diverticulitis without abscess. Small free fluid in   the abdomen extending to the right quadrant.    EXAM:  CT ABDOMEN AND PELVIS OC IC                            PROCEDURE DATE:  05/29/2017        INTERPRETATION:  CT OF THE ABDOMEN AND PELVIS WITH IV CONTRAST     CLINICAL INDICATION: diverticulitis, sepsis s/p hartmans    TECHNIQUE: CT scan of the abdomen and pelvis was performed from the domes   of the diaphragm to the symphysis pubis with oral and intravenous   contrast.  Intravenous administration of 90 cc of Omnipaque-350, 10 cc   discarded, was without complication.  Sagittal and coronalreformatted   images were provided.    COMPARISON: CT abdomen pelvis May 19, 2017    FINDINGS:   LOWER THORAX: Moderate bilateral pleural effusions with underlying   compressive atelectasis..    LIVER:  Moderate perihepatic ascites and adjacent free air..  GALLBLADDER: Unremarkable.  BILIARY TREE: Unremarkable.  PANCREAS: Unremarkable.  SPLEEN: Unremarkable.  ADRENALS: There is new expansion of the right adrenal gland with   nonsimple fluid/material, measuring approximately 4.0 x 2.6 cm, may   reflect hemorrhage within the right adrenal gland. Left adrenal gland   appears unremarkable.    KIDNEYS/URETERS: Unremarkable.    BOWEL: Status post sigmoid resection with left lower quadrant colostomy   present and suture material at the rectal stump.Additionally, partial   resection and anastomosis in the region of the cecum is present. There is   peripheral luminal air within the ascending bowel wall and cecum. There   is a surgical drainage catheter with tip in the mid pelvis. No focal   fluidcollection or abscess.    PERITONEUM/RETROPERITONEUM: Extensive free air in the upper abdomen,   compatible with recent surgery. Moderate perihepatic ascites and minimal   fluid in the bilateral paracolic gutters is present.    BLADDER: Small amount of air in the bladder, likely reflecting recent   Cooley catheterization.  REPRODUCTIVE ORGANS: Unremarkable.    VASCULATURE: Within normal limits.  ABDOMINAL WALL: Postsurgical changes within the abdominal wall are   present. Diffuse edema of the abdominal wall. Midline surgical staples   present.    BONES: No aggressive osseous lesion.    IMPRESSION:    Status post sigmoid resection with left lower quadrant colostomy present   and suture material at the rectal stump. Additionally, partial resection   and anastomosis in the region of the cecum is present. There is   peripheral luminal air within the ascending bowel wall and cecum. There   is a surgical drainage catheter with tip in the mid pelvis. No focal   fluid collection or abscess. Moderate perihepatic ascites and minimal   fluid in the bilateral paracolic gutters is present.    New expansion of the right adrenal gland with hyperattenuating material,   measuring approximately 4.0 x 2.6 cm, may reflect hemorrhage within the   right adrenal gland.     Moderate bilateral pleural effusions with underlying compressive   atelectasis..    Rest of the findings as above.          PROCEDURE DATE:  06/12/2017      EXAM:  CT ABDOMEN AND PELVIS OC IC                          INTERPRETATION:  CT ABDOMEN AND PELVIS OC IC    HISTORY:  follow up ileocolic anastomotic leak    Technique: CT of the abdomen and pelvis is performed with oral and   intravenous contrast. Axial images are supplemented with coronal and   sagittal reformations. This study was performed using automatic exposure   control (radiation dose reduction software) to obtain a diagnostic image   quality scan with patient dose as low as reasonably achievable.    Contrast: 90 cc Omnipaque 350    Comparison: CT abdomen and pelvis 6/5/2017    Findings:  LIVER: Normal.  SPLEEN: Normal.  PANCREAS: Normal.  GALLBLADDER/BILIARY TREE: Nondilated. Normal gallbladder.  ADRENALS: Stable right adrenal hemorrhage.  KIDNEYS: No calcification, hydronephrosis, or soft tissue attenuating   renal mass.  LYMPHADENOPATHY/RETROPERITONEUM: No adenopathy.  VASCULATURE: Normal caliber aorta.    BOWEL: Stable appearance of rectal stump with interval removal of   adjacent pelvic drain and decreased trace pelvic free fluid. Left end   colostomy is intact. Ileocolic anastomosis is unchanged in appearance.     Interval placement of a pigtail drainage catheter within the gallbladder   fossa collection, which has decreased in size measuring 5.3 x 1.9 cm   (series 2 image 46), previously 8.4 x 5.0 cm. There is persistent air and   a small amount of residual oral contrast within the collection. Drainage   catheter over the right hepatic dome has been removed. Interval decrease   in perihepatic and right paracolic gutter free fluid. Persistent   thickened peritoneal enhancement.    PELVIC VISCERA: Unremarkable.  PELVIC LYMPH NODES: No pelvic adenopathy.    PERITONEUM/ABDOMINAL WALL: Persistent, but decreased free air. No new   collections.    SKELETAL: No acute bony abnormality.  LUNG BASES: Persistent small right pleural effusion with right lower lobe   atelectasis. Decreased trace left pleural effusion.    IMPRESSION:     Interval decrease in size of contained ileocolic anastomotic leak within   the gallbladder fossa status post drainage measuring 5.3 x 1.9 cm,   previously 8.4 x 5.0 cm. Persistent air and small residual oral contrast   within the collection.    Interval decrease in perihepatic, right pericolic gutter, and pelvic free   fluid. Persistent thickened and enhancing peritoneum indicative of   peritonitis.    Persistent, but decreased free air.    No new collections.      Advanced directive addressed: full resuscitation

## 2017-06-13 NOTE — PROGRESS NOTE ADULT - SUBJECTIVE AND OBJECTIVE BOX
CT A/P demonstrated decreased collection within GB fossa, however, persistent c/w continued contained leak.    MEDICATIONS  (STANDING):  heparin  Injectable 5000Unit(s) SubCutaneous every 8 hours  ALBUTerol/ipratropium for Nebulization 3milliLiter(s) Nebulizer every 6 hours  meropenem IVPB 1000milliGRAM(s) IV Intermittent every 8 hours  fluconAZOLE   Tablet 100milliGRAM(s) Oral daily  pantoprazole  Injectable 40milliGRAM(s) IV Push daily  alteplase for catheter clearance 2milliGRAM(s) Catheter once  Parenteral Nutrition - Adult 1Each TPN Continuous <Continuous>  octreotide  Injectable 200MICROGram(s) SubCutaneous every 8 hours    MEDICATIONS  (PRN):  naloxone Injectable 0.1milliGRAM(s) IV Push every 3 minutes PRN For ANY of the following changes in patient status:  A. RR LESS THAN 10 breaths per minute, B. Oxygen saturation LESS THAN 90%, C. Sedation score of 6  ondansetron Injectable 4milliGRAM(s) IV Push every 6 hours PRN Nausea  melatonin Oral Tab/Cap - Peds 3milliGRAM(s) Oral at bedtime PRN Insomnia    Vital Signs Last 24 Hrs  T(C): 37, Max: 37.1 (06-12 @ 17:20)  T(F): 98.6, Max: 98.8 (06-12 @ 17:20)  HR: 61 (57 - 87)  BP: 114/74 (111/66 - 114/74)  BP(mean): --  RR: 18 (16 - 18)  SpO2: 99% (98% - 100%)  I&O's Detail    I & Os for current day (as of 13 Jun 2017 07:57)  =============================================  IN:    Fat Emulsion 20%: 250 ml    IV PiggyBack: 200 ml    Total IN: 450 ml  ---------------------------------------------  OUT:    Voided: 1400 ml    Colostomy: 400 ml    T-Tube: 190 ml    Total OUT: 1990 ml  ---------------------------------------------  Total NET: -1540 ml    NAD  ABD exam :incision CDI, stoma functional, soft, NTND    A/P - s/p Hartmanns and ileocolic resection for perf diverticulitis, contained anast leak    Findings of CT discussed with wife yesterday evening by phone and with patient this morning at bedside.  TPN cycled beginning yest evening, blood sugars WNL.  Will increase octreotide dosage. Nursing to teach patient self administration of octroetide subq.  Discharge planning for home TPN at night, PICC, IV abx, colostomy and drain care.

## 2017-06-13 NOTE — CONSULT NOTE ADULT - PROBLEM SELECTOR RECOMMENDATION 9
avoid putting pressure on the left elbow.  if symptoms persist, outpatient f/u with me in 2-3 weeks.

## 2017-06-13 NOTE — PROGRESS NOTE ADULT - ASSESSMENT
47M with no significant past medical history admitted on 5/19 for evaluation of lower abdominal pain, decreased appetite and upon admission was found to have sigmoid diverticulitis; patient was medically managed however, on 5/21 patient underwent sigmoid resection, ileocolostomy and currently is post op asking for ice chips. He has decreased urine output and worsening renal function as well as hypotension  1. diverticulitis s/p sigmoid resection/ileostomy c/b sepsis & post-op collection s/p drainage now with persistent peritonitis/abd collection/anastomotic leak  - had IR drainage of fluid collection  -day #8 meroepnem  -will change to ertapenem one gram daily and continue with diflucan 100 mg daily until 6/27  -day #7diflucan given yeast in culture  - will not treat the panresistant E faecium, representative of bowel gregory  -drain in place  Will follow                  - afebrile currently and hd stable  - start IV meropenem after IR drainage 1gmq8h for gram (-) resistant/anaroebic coverage for gi tract  -previously completed course of vancomycin/diflucan/zosyn, antibiotics discontinued on 6/1  - tolerating antibiotics without rashes or side effects   - iv hydration and supportive care

## 2017-06-13 NOTE — PROGRESS NOTE ADULT - SUBJECTIVE AND OBJECTIVE BOX
Patient is a 47y old  Male who presents with a chief complaint of Sigmoid diverticulitis (19 May 2017 18:07)      Subective:  Feels good. Jil clears.    PAST MEDICAL & SURGICAL HISTORY:  Fatty liver  No significant past surgical history      MEDICATIONS  (STANDING):  heparin  Injectable 5000Unit(s) SubCutaneous every 8 hours  ALBUTerol/ipratropium for Nebulization 3milliLiter(s) Nebulizer every 6 hours  fluconAZOLE   Tablet 100milliGRAM(s) Oral daily  pantoprazole  Injectable 40milliGRAM(s) IV Push daily  alteplase for catheter clearance 2milliGRAM(s) Catheter once  Parenteral Nutrition - Adult 1Each TPN Continuous <Continuous>  octreotide  Injectable 200MICROGram(s) SubCutaneous every 8 hours  ertapenem  IVPB 1000milliGRAM(s) IV Intermittent every 24 hours  Parenteral Nutrition - Adult 1Each TPN Continuous <Continuous>  fat emulsion 20% Infusion 50Gram(s) IV Continuous <Continuous>    MEDICATIONS  (PRN):  naloxone Injectable 0.1milliGRAM(s) IV Push every 3 minutes PRN For ANY of the following changes in patient status:  A. RR LESS THAN 10 breaths per minute, B. Oxygen saturation LESS THAN 90%, C. Sedation score of 6  ondansetron Injectable 4milliGRAM(s) IV Push every 6 hours PRN Nausea  melatonin Oral Tab/Cap - Peds 3milliGRAM(s) Oral at bedtime PRN Insomnia      REVIEW OF SYSTEMS:    RESPIRATORY: No shortness of breath  CARDIOVASCULAR: No chest pain  All other review of systems is negative unless indicated above.    Vital Signs Last 24 Hrs  T(C): 37, Max: 37.1 (06-12 @ 17:20)  T(F): 98.6, Max: 98.8 (06-12 @ 17:20)  HR: 61 (57 - 87)  BP: 114/74 (111/66 - 114/74)  BP(mean): --  RR: 18 (16 - 18)  SpO2: 99% (98% - 100%)    PHYSICAL EXAM:    Constitutional: NAD, well-developed  Respiratory: CTAB  Cardiovascular: S1 and S2, RRR  Gastrointestinal: BS+, soft, NT/ND  Extremities: No peripheral edema  Psychiatric: Normal mood, normal affect    LABS:    06-13    139  |  102  |  15  ----------------------------<  116<H>  4.5   |  30  |  0.85    Ca    8.3<L>      13 Jun 2017 07:21  Phos  3.4     06-13  Mg     2.2     06-13            RADIOLOGY & ADDITIONAL STUDIES:

## 2017-06-14 RX ORDER — I.V. FAT EMULSION 20 G/100ML
50 EMULSION INTRAVENOUS
Qty: 0 | Refills: 0 | Status: DISCONTINUED | OUTPATIENT
Start: 2017-06-14 | End: 2017-06-14

## 2017-06-14 RX ORDER — ELECTROLYTE SOLUTION,INJ
1 VIAL (ML) INTRAVENOUS
Qty: 0 | Refills: 0 | Status: DISCONTINUED | OUTPATIENT
Start: 2017-06-14 | End: 2017-06-14

## 2017-06-14 RX ADMIN — OCTREOTIDE ACETATE 200 MICROGRAM(S): 200 INJECTION, SOLUTION INTRAVENOUS; SUBCUTANEOUS at 17:43

## 2017-06-14 RX ADMIN — ERTAPENEM SODIUM 120 MILLIGRAM(S): 1 INJECTION, POWDER, LYOPHILIZED, FOR SOLUTION INTRAMUSCULAR; INTRAVENOUS at 13:02

## 2017-06-14 RX ADMIN — Medication 1 EACH: at 22:10

## 2017-06-14 RX ADMIN — I.V. FAT EMULSION 31.25 GRAM(S): 20 EMULSION INTRAVENOUS at 22:10

## 2017-06-14 RX ADMIN — HEPARIN SODIUM 5000 UNIT(S): 5000 INJECTION INTRAVENOUS; SUBCUTANEOUS at 08:53

## 2017-06-14 RX ADMIN — HEPARIN SODIUM 5000 UNIT(S): 5000 INJECTION INTRAVENOUS; SUBCUTANEOUS at 17:42

## 2017-06-14 RX ADMIN — OCTREOTIDE ACETATE 200 MICROGRAM(S): 200 INJECTION, SOLUTION INTRAVENOUS; SUBCUTANEOUS at 09:27

## 2017-06-14 RX ADMIN — FLUCONAZOLE 100 MILLIGRAM(S): 150 TABLET ORAL at 13:03

## 2017-06-14 RX ADMIN — PANTOPRAZOLE SODIUM 40 MILLIGRAM(S): 20 TABLET, DELAYED RELEASE ORAL at 13:03

## 2017-06-14 NOTE — PROGRESS NOTE ADULT - SUBJECTIVE AND OBJECTIVE BOX
abdominal pain.    sitting up in bed.  appears comfortable.  offers no new complaints.    ROS unchanged.    thin, tall wm in no acute distress.  NCAT.  sclera nonicteric.  neck supple.  lungs CTA.  no w/r/r.  heart regular.  NS1S2.  no m/r/g.  abd (+) RUQ percutaneous tube draining bilious fluid.  (+) LLQ ostomy.    MEDICATIONS  (STANDING):  heparin  Injectable 5000Unit(s) SubCutaneous every 8 hours  ALBUTerol/ipratropium for Nebulization 3milliLiter(s) Nebulizer every 6 hours  fluconAZOLE   Tablet 100milliGRAM(s) Oral daily  pantoprazole  Injectable 40milliGRAM(s) IV Push daily  alteplase for catheter clearance 2milliGRAM(s) Catheter once  octreotide  Injectable 200MICROGram(s) SubCutaneous every 8 hours  ertapenem  IVPB 1000milliGRAM(s) IV Intermittent every 24 hours  Parenteral Nutrition - Adult 1Each TPN Continuous <Continuous>  Parenteral Nutrition - Adult 1Each TPN Continuous <Continuous>  fat emulsion 20% Infusion 50Gram(s) IV Continuous <Continuous>    MEDICATIONS  (PRN):  naloxone Injectable 0.1milliGRAM(s) IV Push every 3 minutes PRN For ANY of the following changes in patient status:  A. RR LESS THAN 10 breaths per minute, B. Oxygen saturation LESS THAN 90%, C. Sedation score of 6  ondansetron Injectable 4milliGRAM(s) IV Push every 6 hours PRN Nausea  melatonin Oral Tab/Cap - Peds 3milliGRAM(s) Oral at bedtime PRN Insomnia    Vital Signs Last 24 Hrs  T(C): 36.7, Max: 37.1 (06-13 @ 16:53)  T(F): 98, Max: 98.7 (06-13 @ 16:53)  HR: 64 (61 - 64)  BP: 104/59 (104/59 - 117/64)  BP(mean): --  RR: 16 (16 - 18)  SpO2: 100% (98% - 100%)      06-13    139  |  102  |  15  ----------------------------<  116<H>  4.5   |  30  |  0.85    Ca    8.3<L>      13 Jun 2017 07:21  Phos  3.4     06-13  Mg     2.2     06-13    47M admitted for a 24hr h/o progressive abdominal pain that began after a 1-2 day trip to [facility]. CT with acute perforated sigmoid diverticulitis, s/p LAP with resection (Hartmanns/ileocolic resection) complicated by septic shock secondary to the above presently off pressor support. Rec'd large of INF for post fluid resuscitation now with anasarca    complicated diverticulitis.  abscess of sigmoid colon.  anastomosis leak. perihepatic fluid collection.  -05/21 sigmoid resection and Henry's procedure.  -05/30 IR percutaneous drainage secondary to perihepatic fluid collection.    -c/w Invanz + fluconazole (Cx (+) yeast) until 06/27.  -TPN to be continued at home.  -self administer octreotide.  -awaiting SW coordination to d/c home with visiting home RN.

## 2017-06-14 NOTE — PROGRESS NOTE ADULT - SUBJECTIVE AND OBJECTIVE BOX
No c/o. Learning to inject himself with octroetide subq.    MEDICATIONS  (STANDING):  heparin  Injectable 5000Unit(s) SubCutaneous every 8 hours  ALBUTerol/ipratropium for Nebulization 3milliLiter(s) Nebulizer every 6 hours  fluconAZOLE   Tablet 100milliGRAM(s) Oral daily  pantoprazole  Injectable 40milliGRAM(s) IV Push daily  alteplase for catheter clearance 2milliGRAM(s) Catheter once  octreotide  Injectable 200MICROGram(s) SubCutaneous every 8 hours  ertapenem  IVPB 1000milliGRAM(s) IV Intermittent every 24 hours  Parenteral Nutrition - Adult 1Each TPN Continuous <Continuous>    MEDICATIONS  (PRN):  naloxone Injectable 0.1milliGRAM(s) IV Push every 3 minutes PRN For ANY of the following changes in patient status:  A. RR LESS THAN 10 breaths per minute, B. Oxygen saturation LESS THAN 90%, C. Sedation score of 6  ondansetron Injectable 4milliGRAM(s) IV Push every 6 hours PRN Nausea  melatonin Oral Tab/Cap - Peds 3milliGRAM(s) Oral at bedtime PRN Insomnia    Vital Signs Last 24 Hrs  T(C): 36.7, Max: 37.1 (06-13 @ 11:03)  T(F): 98, Max: 98.8 (06-13 @ 11:03)  HR: 64 (61 - 64)  BP: 109/51 (109/51 - 117/64)  BP(mean): --  RR: 18 (18 - 18)  SpO2: 99% (98% - 99%)  I&O's Detail    I & Os for current day (as of 14 Jun 2017 07:47)  =============================================  IN:    TPN (Total Parenteral Nutrition): 1680 ml    Fat Emulsion 20%: 250 ml    Total IN: 1930 ml  ---------------------------------------------  OUT:    Voided: 950 ml    Colostomy: 100 ml    T-Tube: 35 ml feculent    Total OUT: 1085 ml  ---------------------------------------------  Total NET: 845 ml    ABD exam :colostomy viable, soft, NTND    06-13    139  |  102  |  15  ----------------------------<  116<H>  4.5   |  30  |  0.85    Ca    8.3<L>      13 Jun 2017 07:21  Phos  3.4     06-13  Mg     2.2     06-13    A/P -   TPN cycled, IV abx changed to once daily Invanz, patient learning to self administer octreotide.  Awaiting  coordination to d/c home with visiting home RN.  Pt and wife aware.

## 2017-06-14 NOTE — PROGRESS NOTE ADULT - ASSESSMENT
47M with no significant past medical history admitted on 5/19 for evaluation of lower abdominal pain, decreased appetite and upon admission was found to have sigmoid diverticulitis; patient was medically managed however, on 5/21 patient underwent sigmoid resection, ileocolostomy and currently is post op asking for ice chips. He has decreased urine output and worsening renal function as well as hypotension  1. diverticulitis s/p sigmoid resection/ileostomy c/b sepsis & post-op collection s/p drainage now with persistent peritonitis/abd collection/anastomotic leak  - had IR drainage of fluid collection  - completed 8 days of meroepnem  -now on ertapenem one gram daily #2 - plan to continue with invanz and continue with diflucan 100 mg daily until 6/27  -day #8 diflucan given yeast in culture  - will not treat the panresistant E faecium, representative of bowel gregory  -drain in place  Will follow

## 2017-06-14 NOTE — PROGRESS NOTE ADULT - SUBJECTIVE AND OBJECTIVE BOX
HPI:  47M with no significant past medical history admitted on 5/19 for evaluation of lower abdominal pain, decreased appetite and upon admission was found to have sigmoid diverticulitis; patient was medically managed however, on 5/21 patient underwent sigmoid resection, ileocolostomy and currently is post op asking for ice chips. He has decreased urine output and worsening renal function as well as hypotension.  hospital course c/b septic shock/renal failure post-operatively, requiring pressor support/icu admission, found to have perihepatic collection s/p drainage on 5/30, noted to be clinically improving, but with low grade temps on 6/4  underwent repeat CT abd/pelvis 6/5 showing anastomotic leak/persistent peritonitis with new R abd collection  plan for IR drainage of collection 6/6 and PICC line for TPN   Today 6/7 patient comfortable, had dual lumen picc line placed, undergoing TPN  Today 6/8 patient feels good, no complaints, has aroma to stool that alerted nursing to check for cdiff  Today 6/9 patient without complaints, cdiff assy negative  Today 6/12 patient comfortable  Today 6/13 patient without complaints, discharge planning underway  Today 6/14 no complaints, plan for d/c home soon      MEDICATIONS  (STANDING):  heparin  Injectable 5000Unit(s) SubCutaneous every 8 hours  ALBUTerol/ipratropium for Nebulization 3milliLiter(s) Nebulizer every 6 hours  fluconAZOLE   Tablet 100milliGRAM(s) Oral daily  pantoprazole  Injectable 40milliGRAM(s) IV Push daily  alteplase for catheter clearance 2milliGRAM(s) Catheter once  octreotide  Injectable 200MICROGram(s) SubCutaneous every 8 hours  ertapenem  IVPB 1000milliGRAM(s) IV Intermittent every 24 hours  Parenteral Nutrition - Adult 1Each TPN Continuous <Continuous>  Parenteral Nutrition - Adult 1Each TPN Continuous <Continuous>  fat emulsion 20% Infusion 50Gram(s) IV Continuous <Continuous>      Vital Signs Last 24 Hrs  T(C): 36.7, Max: 37.1 (06-13 @ 16:53)  T(F): 98, Max: 98.7 (06-13 @ 16:53)  HR: 64 (61 - 64)  BP: 109/51 (109/51 - 117/64)  BP(mean): --  RR: 18 (18 - 18)  SpO2: 99% (98% - 99%)              Physical Exam:  Constitutional: well developed  HEENT: NC/AT, EOMI, PERRLA  Neck: supple  Respiratory: clear  Cardiovascular: S1S2 regular, no murmurs  Abdomen:  dressing in place left sided ostomy, drain in right quadrant with feculent material  Genitourinary: deferred  Rectal: deferred  Musculoskeletal: no muscle tenderness, no joint swelling or tenderness  Neurological: AxOx3, moving all extremities, no focal deficits  Skin: no rashes    Labs:          06-13    139  |  102  |  15  ----------------------------<  116<H>  4.5   |  30  |  0.85    Ca    8.3<L>      13 Jun 2017 07:21  Phos  3.4     06-13  Mg     2.2     06-13                            Culture - Body Fluid with Gram Stain (06.06.17 @ 13:50)    -  Ampicillin: S 4    -  Cefazolin: S <=2    -  Ceftriaxone: S <=1    -  Ertapenem: S <=0.5    -  Gentamicin: S <=1    -  Piperacillin/Tazobactam: S <=8    -  Tetra/Doxy: R >8    -  Vancomycin: R >16    -  Ceftazidime: S <=1    -  Ciprofloxacin: S <=0.5    -  Levofloxacin: S <=1    -  Trimethoprim/Sulfamethoxazole: S <=0.5/9.5    -  Ampicillin: R >8    -  Aztreonam: S <=4    -  Cefepime: S <=2    -  Cefoxitin: S <=4    -  Meropenem: S <=1    -  Tobramycin: S 4    Gram Stain:   Few polymorphonuclear leukocytes per low power field  Moderate Gram Negative Rods per oil power field  Moderate Gram Positive Rods per oil power field  Few Gram Positive Cocci in Pairs and Chains per oil power field  Rare Yeast like cells per oil power field    -  Amikacin: S <=8    -  Ampicillin/Sulbactam: S <=4/2    -  Ciprofloxacin: R >2    -  Erythromycin: R >4    -  Imipenem: S <=1    Specimen Source: .Body Fluid Diverticular collection drainage    Culture Results:   Numerous Escherichia coli  Moderate Enterococcus faecium  Few Yeast  Rule Out Anaerobes    Organism Identification: Escherichia coli  Enterococcus faecium    Organism: Escherichia coli    Organism: Enterococcus faecium    Method Type: MADISON    Method Type: MADISON              Cultures: Culture - Blood (05.23.17 @ 11:20)    Gram Stain:   Growth in anaerobic bottle: Gram Positive Cocci in Clusters    Specimen Source: .Blood Blood    Culture Results:   Growth in anaerobic bottle: Coag Negative Staphylococcus  Single set isolate, possible contaminant. Contact  Microbiology if susceptibility testing clinically  indicated.    Culture - Body Fluid with Gram Stain (05.30.17 @ 16:30)    Gram Stain:   polymorphonuclear leukocytes seen  No organisms seen  by cytocentrifuge    Specimen Source: Abdominal Fl None    Culture Results:   No growth to date            Radiology:  EXAM:  CT ABDOMEN AND PELVIS OC IC                            PROCEDURE DATE:  06/05/2017        INTERPRETATION:  CT ABDOMEN AND PELVIS OC IC    HISTORY:  perforated diverticulitis s/p Hartmans,    Technique: CT of the abdomen and pelvis is performed with oral and   intravenous contrast. Axial images are supplemented with coronal and   sagittal reformations. This study was performed using automatic exposure   control (radiation dose reduction software) to obtain a diagnostic image   quality scan with patient dose as low as reasonably achievable.    Contrast: 90 cc Omnipaque 350    Comparison: CT abdomen and pelvis 5/29/2017    Findings:  LIVER: Normal.  SPLEEN: Normal.  PANCREAS: Normal.  GALLBLADDER/BILIARY TREE: Nondilated. Normal gallbladder.  ADRENALS: Stable right adrenal hemorrhage.  KIDNEYS: No calcification, hydronephrosis, or renal mass.  LYMPHADENOPATHY/RETROPERITONEUM: Prominent upper abdominal lymph nodes.  VASCULATURE: Normal caliber aorta.    BOWEL: Stable postsurgical anatomy after [employer] and ileocolic   anastomosis. Normal appearance of the rectal stump with an adjacent drain   in place. Unremarkable left lower quadrant colostomy. Oral contrast   material reaches the colostomy. No bowel obstruction.    PELVIC VISCERA: Unremarkable prostate, seminal vesicles, and urinary   bladder.  PELVIC LYMPH NODES: No pelvic adenopathy.    PERITONEUM/ABDOMINAL WALL: Persistent small pneumoperitoneum with new   droplets of gas on the left. Right perihepatic pigtail catheter is in   place. Increased thickening of the perihepatic and Morison's pouch   peritoneal lining indicative of peritonitis. Stable small perihepatic   fluid. Slightly increased pelvic free fluid.     There is interval development of an air, fluid, and oral contrast   collection at the level of the gallbladder fossa measuring 8.4 x 5.0 cm   (series 2 image 47). The oral contrast material is seen tracking from the   superior aspect of the ileocolic anastomosis.    SKELETAL: No acute bony abnormality.  LUNG BASES: Stable small right pleural effusion with right base   atelectasis. Decreased trace left pleural effusion.    IMPRESSION:     Definitive evidence of an anastomotic leak at the superior aspect of the   ileocolic anastomosis with air, fluid,and oral contrast material   tracking into the gallbladder fossa with the collection measuring 8.4 x   5.0 cm. Persistent pneumoperitoneum and free fluid. Right upper quadrant   drain in place. Thickening of the right-sided peritoneal lining   indicative of peritonitis.    These results were communicated to Dr. Boss by me over telephone at 1:45   PM on 6/5/2017.      EXAM:  CT ABDOMEN AND PELVIS IC                            PROCEDURE DATE:  05/19/2017        INTERPRETATION:  CLINICAL INFORMATION: 47-year-old man with abdominal   pain assess for appendicitis     TECHNIQUE:    CT of abdomen and pelvis was performed with axial images   were obtained from the diaphragm to pubic symphysis oral and IV contrast.    COMPARISON:  None.    FINDINGS:    Liver: Borderline hepatomegaly with mild fatty infiltration otherwise   within normal limits  Gallbladder: Within normal limits  Bile ducts: No intrahepatic or extrahepatic biliary dilatation  Pancreas: within normal limits    Spleen: within normal limits  Adrenal: within normal limits  Kidneys, Ureters and Bladder: Symmetric enhancement bilaterally. No   perinephric attending or collections. No hydronephrosis. No intrarenal   calculi. Normal caliber of the ureters. Limited unopacified nondistended   bladder.    Pelvis: No pelvic adenopathy or pelvic free fluid.  Small fat-containing   bilateral inguinal hernias.      Bowel: No bowel obstruction.  There are few scattered colonic   diverticula. There is focal thickening of sigmoid colon in the pelvis   associated with mesenteric fat stranding and thickening of adjacent   fascial planes with localized perforation and small air bubbles without   abscess. Findings are consistent with acute rupture diverticulitis. Small   free fluid adjacent to gas filled appendix.    Peritoneum: Small ascites, no organized fluid collections.    Retroperitoneum: within normal limits  Vessels: Patent.    Abdominal wall: Small fat-containing umbilical hernia.    Lower chest and lung Bases: The visualized lung bases clear except for   subsegmental dependent atelectasis. Heart normal in size.   Bones: Degenerative changes.     IMPRESSION:     Focal thickening of sigmoid colon with mesenteric fat stranding and   thickening of fascial planes with scattered adjacent air bubbles   consistent with acute diverticulitis without abscess. Small free fluid in   the abdomen extending to the right quadrant.    EXAM:  CT ABDOMEN AND PELVIS OC IC                            PROCEDURE DATE:  05/29/2017        INTERPRETATION:  CT OF THE ABDOMEN AND PELVIS WITH IV CONTRAST     CLINICAL INDICATION: diverticulitis, sepsis s/p hartmans    TECHNIQUE: CT scan of the abdomen and pelvis was performed from the domes   of the diaphragm to the symphysis pubis with oral and intravenous   contrast.  Intravenous administration of 90 cc of Omnipaque-350, 10 cc   discarded, was without complication.  Sagittal and coronalreformatted   images were provided.    COMPARISON: CT abdomen pelvis May 19, 2017    FINDINGS:   LOWER THORAX: Moderate bilateral pleural effusions with underlying   compressive atelectasis..    LIVER:  Moderate perihepatic ascites and adjacent free air..  GALLBLADDER: Unremarkable.  BILIARY TREE: Unremarkable.  PANCREAS: Unremarkable.  SPLEEN: Unremarkable.  ADRENALS: There is new expansion of the right adrenal gland with   nonsimple fluid/material, measuring approximately 4.0 x 2.6 cm, may   reflect hemorrhage within the right adrenal gland. Left adrenal gland   appears unremarkable.    KIDNEYS/URETERS: Unremarkable.    BOWEL: Status post sigmoid resection with left lower quadrant colostomy   present and suture material at the rectal stump.Additionally, partial   resection and anastomosis in the region of the cecum is present. There is   peripheral luminal air within the ascending bowel wall and cecum. There   is a surgical drainage catheter with tip in the mid pelvis. No focal   fluidcollection or abscess.    PERITONEUM/RETROPERITONEUM: Extensive free air in the upper abdomen,   compatible with recent surgery. Moderate perihepatic ascites and minimal   fluid in the bilateral paracolic gutters is present.    BLADDER: Small amount of air in the bladder, likely reflecting recent   Cooley catheterization.  REPRODUCTIVE ORGANS: Unremarkable.    VASCULATURE: Within normal limits.  ABDOMINAL WALL: Postsurgical changes within the abdominal wall are   present. Diffuse edema of the abdominal wall. Midline surgical staples   present.    BONES: No aggressive osseous lesion.    IMPRESSION:    Status post sigmoid resection with left lower quadrant colostomy present   and suture material at the rectal stump. Additionally, partial resection   and anastomosis in the region of the cecum is present. There is   peripheral luminal air within the ascending bowel wall and cecum. There   is a surgical drainage catheter with tip in the mid pelvis. No focal   fluid collection or abscess. Moderate perihepatic ascites and minimal   fluid in the bilateral paracolic gutters is present.    New expansion of the right adrenal gland with hyperattenuating material,   measuring approximately 4.0 x 2.6 cm, may reflect hemorrhage within the   right adrenal gland.     Moderate bilateral pleural effusions with underlying compressive   atelectasis..    Rest of the findings as above.          PROCEDURE DATE:  06/12/2017      EXAM:  CT ABDOMEN AND PELVIS OC IC                          INTERPRETATION:  CT ABDOMEN AND PELVIS OC IC    HISTORY:  follow up ileocolic anastomotic leak    Technique: CT of the abdomen and pelvis is performed with oral and   intravenous contrast. Axial images are supplemented with coronal and   sagittal reformations. This study was performed using automatic exposure   control (radiation dose reduction software) to obtain a diagnostic image   quality scan with patient dose as low as reasonably achievable.    Contrast: 90 cc Omnipaque 350    Comparison: CT abdomen and pelvis 6/5/2017    Findings:  LIVER: Normal.  SPLEEN: Normal.  PANCREAS: Normal.  GALLBLADDER/BILIARY TREE: Nondilated. Normal gallbladder.  ADRENALS: Stable right adrenal hemorrhage.  KIDNEYS: No calcification, hydronephrosis, or soft tissue attenuating   renal mass.  LYMPHADENOPATHY/RETROPERITONEUM: No adenopathy.  VASCULATURE: Normal caliber aorta.    BOWEL: Stable appearance of rectal stump with interval removal of   adjacent pelvic drain and decreased trace pelvic free fluid. Left end   colostomy is intact. Ileocolic anastomosis is unchanged in appearance.     Interval placement of a pigtail drainage catheter within the gallbladder   fossa collection, which has decreased in size measuring 5.3 x 1.9 cm   (series 2 image 46), previously 8.4 x 5.0 cm. There is persistent air and   a small amount of residual oral contrast within the collection. Drainage   catheter over the right hepatic dome has been removed. Interval decrease   in perihepatic and right paracolic gutter free fluid. Persistent   thickened peritoneal enhancement.    PELVIC VISCERA: Unremarkable.  PELVIC LYMPH NODES: No pelvic adenopathy.    PERITONEUM/ABDOMINAL WALL: Persistent, but decreased free air. No new   collections.    SKELETAL: No acute bony abnormality.  LUNG BASES: Persistent small right pleural effusion with right lower lobe   atelectasis. Decreased trace left pleural effusion.    IMPRESSION:     Interval decrease in size of contained ileocolic anastomotic leak within   the gallbladder fossa status post drainage measuring 5.3 x 1.9 cm,   previously 8.4 x 5.0 cm. Persistent air and small residual oral contrast   within the collection.    Interval decrease in perihepatic, right pericolic gutter, and pelvic free   fluid. Persistent thickened and enhancing peritoneum indicative of   peritonitis.    Persistent, but decreased free air.    No new collections.      Advanced directive addressed: full resuscitation

## 2017-06-15 LAB
ANION GAP SERPL CALC-SCNC: 7 MMOL/L — SIGNIFICANT CHANGE UP (ref 5–17)
BUN SERPL-MCNC: 16 MG/DL — SIGNIFICANT CHANGE UP (ref 7–23)
CALCIUM SERPL-MCNC: 8.4 MG/DL — LOW (ref 8.5–10.1)
CHLORIDE SERPL-SCNC: 103 MMOL/L — SIGNIFICANT CHANGE UP (ref 96–108)
CO2 SERPL-SCNC: 28 MMOL/L — SIGNIFICANT CHANGE UP (ref 22–31)
CREAT SERPL-MCNC: 0.8 MG/DL — SIGNIFICANT CHANGE UP (ref 0.5–1.3)
GLUCOSE SERPL-MCNC: 119 MG/DL — HIGH (ref 70–99)
HCT VFR BLD CALC: 32.6 % — LOW (ref 39–50)
HGB BLD-MCNC: 10.7 G/DL — LOW (ref 13–17)
MCHC RBC-ENTMCNC: 29.2 PG — SIGNIFICANT CHANGE UP (ref 27–34)
MCHC RBC-ENTMCNC: 32.9 GM/DL — SIGNIFICANT CHANGE UP (ref 32–36)
MCV RBC AUTO: 88.5 FL — SIGNIFICANT CHANGE UP (ref 80–100)
PLATELET # BLD AUTO: 458 K/UL — HIGH (ref 150–400)
POTASSIUM SERPL-MCNC: 4.3 MMOL/L — SIGNIFICANT CHANGE UP (ref 3.5–5.3)
POTASSIUM SERPL-SCNC: 4.3 MMOL/L — SIGNIFICANT CHANGE UP (ref 3.5–5.3)
RBC # BLD: 3.68 M/UL — LOW (ref 4.2–5.8)
RBC # FLD: 14 % — SIGNIFICANT CHANGE UP (ref 10.3–14.5)
SODIUM SERPL-SCNC: 138 MMOL/L — SIGNIFICANT CHANGE UP (ref 135–145)
WBC # BLD: 8.5 K/UL — SIGNIFICANT CHANGE UP (ref 3.8–10.5)
WBC # FLD AUTO: 8.5 K/UL — SIGNIFICANT CHANGE UP (ref 3.8–10.5)

## 2017-06-15 RX ORDER — ERTAPENEM SODIUM 1 G/1
1 INJECTION, POWDER, LYOPHILIZED, FOR SOLUTION INTRAMUSCULAR; INTRAVENOUS
Qty: 0 | Refills: 0 | COMMUNITY
Start: 2017-06-15 | End: 2017-06-27

## 2017-06-15 RX ORDER — FLUCONAZOLE 150 MG/1
1 TABLET ORAL
Qty: 10 | Refills: 0 | OUTPATIENT
Start: 2017-06-15 | End: 2017-06-25

## 2017-06-15 RX ORDER — ELECTROLYTE SOLUTION,INJ
1 VIAL (ML) INTRAVENOUS
Qty: 0 | Refills: 0 | COMMUNITY
Start: 2017-06-15

## 2017-06-15 RX ORDER — ELECTROLYTE SOLUTION,INJ
1 VIAL (ML) INTRAVENOUS
Qty: 0 | Refills: 0 | Status: DISCONTINUED | OUTPATIENT
Start: 2017-06-15 | End: 2017-06-15

## 2017-06-15 RX ORDER — OCTREOTIDE ACETATE 200 UG/ML
200 INJECTION, SOLUTION INTRAVENOUS; SUBCUTANEOUS
Qty: 50 | Refills: 2 | OUTPATIENT
Start: 2017-06-15 | End: 2017-07-26

## 2017-06-15 RX ORDER — I.V. FAT EMULSION 20 G/100ML
50 EMULSION INTRAVENOUS
Qty: 0 | Refills: 0 | Status: DISCONTINUED | OUTPATIENT
Start: 2017-06-15 | End: 2017-06-15

## 2017-06-15 RX ADMIN — HEPARIN SODIUM 5000 UNIT(S): 5000 INJECTION INTRAVENOUS; SUBCUTANEOUS at 15:20

## 2017-06-15 RX ADMIN — PANTOPRAZOLE SODIUM 40 MILLIGRAM(S): 20 TABLET, DELAYED RELEASE ORAL at 11:32

## 2017-06-15 RX ADMIN — HEPARIN SODIUM 5000 UNIT(S): 5000 INJECTION INTRAVENOUS; SUBCUTANEOUS at 22:09

## 2017-06-15 RX ADMIN — HEPARIN SODIUM 5000 UNIT(S): 5000 INJECTION INTRAVENOUS; SUBCUTANEOUS at 06:40

## 2017-06-15 RX ADMIN — OCTREOTIDE ACETATE 200 MICROGRAM(S): 200 INJECTION, SOLUTION INTRAVENOUS; SUBCUTANEOUS at 06:51

## 2017-06-15 RX ADMIN — Medication 1 EACH: at 22:01

## 2017-06-15 RX ADMIN — FLUCONAZOLE 100 MILLIGRAM(S): 150 TABLET ORAL at 11:32

## 2017-06-15 RX ADMIN — ERTAPENEM SODIUM 120 MILLIGRAM(S): 1 INJECTION, POWDER, LYOPHILIZED, FOR SOLUTION INTRAMUSCULAR; INTRAVENOUS at 11:32

## 2017-06-15 RX ADMIN — OCTREOTIDE ACETATE 200 MICROGRAM(S): 200 INJECTION, SOLUTION INTRAVENOUS; SUBCUTANEOUS at 15:22

## 2017-06-15 RX ADMIN — OCTREOTIDE ACETATE 200 MICROGRAM(S): 200 INJECTION, SOLUTION INTRAVENOUS; SUBCUTANEOUS at 22:46

## 2017-06-15 RX ADMIN — I.V. FAT EMULSION 31.25 GRAM(S): 20 EMULSION INTRAVENOUS at 22:01

## 2017-06-15 NOTE — PROGRESS NOTE ADULT - ASSESSMENT
perforated diverticulitis  s/p hartmans  ileocolic resection with leak and s/p IR drainage    TPN cycled, IV abx changed to once daily Invanz, patient learning to self administer octreotide.  Awaiting SW coordination to d/c home with visiting home RN.  Pt and wife aware.

## 2017-06-15 NOTE — PROGRESS NOTE ADULT - ASSESSMENT
47M admitted for a 24hr h/o progressive abdominal pain that began after a 1-2 day trip to [facility]. CT with acute perforated sigmoid diverticulitis, s/p LAP with resection (Hartmanns/ileocolic resection) complicated by septic shock secondary to the above presently off pressor support.     1. Complicated diverticulitis.  abscess of sigmoid colon.  anastomosis leak. perihepatic fluid collection. Peritonitis. Septic shock  S/p pressors   -05/21 sigmoid resection and Henry's procedure.  -05/30 IR percutaneous drainage secondary to perihepatic fluid collection.    -c/w Invanz + fluconazole (Cx (+) yeast) until 06/27.  -TPN to be continued at home.  -self administer octreotide.  -awaiting SW coordination to d/c home with visiting home RN.        2. HARDIK, prerenal and ATN  - resolved with IVF resuscitation  - Renal FX at baseline     Stable for d/c home from medicine stand point

## 2017-06-15 NOTE — PROGRESS NOTE ADULT - SUBJECTIVE AND OBJECTIVE BOX
abdominal pain.    sitting up in bed.  appears comfortable.  offers no new complaints.    ROS unchanged.    thin, tall wm in no acute distress.  NCAT.  sclera nonicteric.  neck supple.  lungs CTA.  no w/r/r.  heart regular.  NS1S2.  no m/r/g.  abd (+) RUQ percutaneous tube draining bilious fluid.  (+) LLQ ostomy.    MEDICATIONS  (STANDING):  heparin  Injectable 5000Unit(s) SubCutaneous every 8 hours  ALBUTerol/ipratropium for Nebulization 3milliLiter(s) Nebulizer every 6 hours  fluconAZOLE   Tablet 100milliGRAM(s) Oral daily  pantoprazole  Injectable 40milliGRAM(s) IV Push daily  alteplase for catheter clearance 2milliGRAM(s) Catheter once  octreotide  Injectable 200MICROGram(s) SubCutaneous every 8 hours  ertapenem  IVPB 1000milliGRAM(s) IV Intermittent every 24 hours  Parenteral Nutrition - Adult 1Each TPN Continuous <Continuous>  Parenteral Nutrition - Adult 1Each TPN Continuous <Continuous>  fat emulsion 20% Infusion 50Gram(s) IV Continuous <Continuous>    MEDICATIONS  (PRN):  naloxone Injectable 0.1milliGRAM(s) IV Push every 3 minutes PRN For ANY of the following changes in patient status:  A. RR LESS THAN 10 breaths per minute, B. Oxygen saturation LESS THAN 90%, C. Sedation score of 6  ondansetron Injectable 4milliGRAM(s) IV Push every 6 hours PRN Nausea  melatonin Oral Tab/Cap - Peds 3milliGRAM(s) Oral at bedtime PRN Insomnia    Vital Signs Last 24 Hrs  T(C): 36.7, Max: 37.1 (06-13 @ 16:53)  T(F): 98, Max: 98.7 (06-13 @ 16:53)  HR: 64 (61 - 64)  BP: 104/59 (104/59 - 117/64)  BP(mean): --  RR: 16 (16 - 18)  SpO2: 100% (98% - 100%)      06-13    139  |  102  |  15  ----------------------------<  116<H>  4.5   |  30  |  0.85    Ca    8.3<L>      13 Jun 2017 07:21  Phos  3.4     06-13  Mg     2.2     06-13    47M admitted for a 24hr h/o progressive abdominal pain that began after a 1-2 day trip to [facility]. CT with acute perforated sigmoid diverticulitis, s/p LAP with resection (Hartmanns/ileocolic resection) complicated by septic shock secondary to the above presently off pressor support. Rec'd large of INF for post fluid resuscitation now with anasarca    complicated diverticulitis.  abscess of sigmoid colon.  anastomosis leak. perihepatic fluid collection.  -05/21 sigmoid resection and Henry's procedure.  -05/30 IR percutaneous drainage secondary to perihepatic fluid collection.    -c/w Invanz + fluconazole (Cx (+) yeast) until 06/27.  -TPN to be continued at home.  -self administer octreotide.  -awaiting SW coordination to d/c home with visiting home RN. CC: perforated diverticulitis (02 Jun 2017 14:38)    HPI: 47M admitted for a 24hr h/o progressive abdominal pain that began after a 1-2 day trip to [facility]. CT with acute perforated sigmoid diverticulitis, s/p LAP with resection (Hartmanns/ileocolic resection) complicated by septic shock secondary to the above and anastomotic leak, peritonitis. S/p  pressor support In ICU.  Hospital course with HARDIK due to Hypotension and prerenal, resolved now. Started in TPN.     INTERVAL HPI/ OVERNIGHT EVENTS: no new complains, feeling well, still generalized weakness,  awaiting for d/c tomorrow.     Vital Signs Last 24 Hrs  T(C): 36.9, Max: 36.9 (06-15 @ 16:41)  T(F): 98.4, Max: 98.4 (06-15 @ 16:41)  HR: 58 (58 - 75)  BP: 109/59 (109/53 - 115/78)  BP(mean): --  RR: 16 (15 - 16)  SpO2: 99% (99% - 100%)    REVIEW OF SYSTEMS:    CONSTITUTIONAL: + generalized  weakness, NO  fevers or chills  EYES/ENT: No visual changes;  No vertigo or throat pain   NECK: No pain or stiffness  RESPIRATORY: No cough, wheezing, hemoptysis; No shortness of breath  CARDIOVASCULAR: No chest pain or palpitations  GASTROINTESTINAL: PAin controlled.  No nausea, vomiting, or hematemesis;  Ostomy output stool getting more formed.  No bleeding   GENITOURINARY: No dysuria, frequency or hematuria  NEUROLOGICAL: No numbness or weakness      All other review of systems is negative unless indicated above.      PHYSICAL EXAM:    General: Well developed;  in no acute distress  Eyes: PERRLA, EOMI; conjunctiva and sclera clear  Head: Normocephalic; atraumatic  ENMT: No nasal discharge; airway clear  Neck: Supple; non tender; no masses  Respiratory:  Good air entry b/l, No wheezes, rales or rhonchi  Cardiovascular: Regular rate and rhythm. S1 and S2 Normal; No murmurs  Gastrointestinal: Soft,  non-tender, normal BS. Ostomy in place with brown stool semisoft. Drain in place. SX wound healed well   Genitourinary: No costovertebral angle tenderness  Extremities: Normal range of motion, No edema  Vascular: Peripheral pulses palpable 2+ bilaterally  Neurological: Alert and oriented x4, non focal   Skin: Warm and dry. No acute rash  Musculoskeletal: Normal tone and MS   Psychiatric: Cooperative and appropriate                          10.7   8.5   )-----------( 458      ( 15 Philip 2017 06:46 )             32.6     15 Philip 2017 06:46    138    |  103    |  16     ----------------------------<  119    4.3     |  28     |  0.80     Ca    8.4        15 Philip 2017 06:46        CAPILLARY BLOOD GLUCOSE  101 (15 Philip 2017 16:48)  89 (15 Philip 2017 12:05)  116 (15 Philip 2017 06:51)          MEDICATIONS  (STANDING):  heparin  Injectable 5000Unit(s) SubCutaneous every 8 hours  ALBUTerol/ipratropium for Nebulization 3milliLiter(s) Nebulizer every 6 hours  fluconAZOLE   Tablet 100milliGRAM(s) Oral daily  pantoprazole  Injectable 40milliGRAM(s) IV Push daily  alteplase for catheter clearance 2milliGRAM(s) Catheter once  octreotide  Injectable 200MICROGram(s) SubCutaneous every 8 hours  ertapenem  IVPB 1000milliGRAM(s) IV Intermittent every 24 hours  Parenteral Nutrition - Adult 1Each TPN Continuous <Continuous>  Parenteral Nutrition - Adult 1Each TPN Continuous <Continuous>    MEDICATIONS  (PRN):  naloxone Injectable 0.1milliGRAM(s) IV Push every 3 minutes PRN For ANY of the following changes in patient status:  A. RR LESS THAN 10 breaths per minute, B. Oxygen saturation LESS THAN 90%, C. Sedation score of 6  ondansetron Injectable 4milliGRAM(s) IV Push every 6 hours PRN Nausea  melatonin Oral Tab/Cap - Peds 3milliGRAM(s) Oral at bedtime PRN Insomnia      RADIOLOGY & ADDITIONAL TESTS:  EXAM:  CT ABDOMEN AND PELVIS OC IC                         PROCEDURE DATE:  06/12/2017      IMPRESSION:     Interval decrease in size of contained ileocolic anastomotic leak within   the gallbladder fossa status post drainage measuring 5.3 x 1.9 cm,   previously 8.4 x 5.0 cm. Persistent air and small residual oral contrast   within the collection.    Interval decrease in perihepatic, right pericolic gutter, and pelvic free   fluid. Persistent thickened and enhancing peritoneum indicative of   peritonitis.    Persistent, but decreased free air.    No new collections.

## 2017-06-15 NOTE — PROGRESS NOTE ADULT - SUBJECTIVE AND OBJECTIVE BOX
no acute events    Vital Signs Last 24 Hrs  T(C): 36.8, Max: 37 (06-14 @ 17:23)  T(F): 98.3, Max: 98.6 (06-14 @ 17:23)  HR: 66 (60 - 75)  BP: 115/78 (109/53 - 115/78)  BP(mean): --  RR: 15 (15 - 15)  SpO2: 100% (99% - 100%)    NAD  abd soft, drain 30 ml over last shift                          10.7   8.5   )-----------( 458      ( 15 Philip 2017 06:46 )             32.6   06-15    138  |  103  |  16  ----------------------------<  119<H>  4.3   |  28  |  0.80    Ca    8.4<L>      15 Philip 2017 06:46

## 2017-06-16 VITALS
SYSTOLIC BLOOD PRESSURE: 106 MMHG | TEMPERATURE: 98 F | RESPIRATION RATE: 16 BRPM | HEART RATE: 62 BPM | DIASTOLIC BLOOD PRESSURE: 64 MMHG | OXYGEN SATURATION: 100 %

## 2017-06-16 RX ORDER — OCTREOTIDE ACETATE 200 UG/ML
100 INJECTION, SOLUTION INTRAVENOUS; SUBCUTANEOUS
Qty: 30 | Refills: 0 | OUTPATIENT
Start: 2017-06-16 | End: 2017-06-21

## 2017-06-16 RX ADMIN — PANTOPRAZOLE SODIUM 40 MILLIGRAM(S): 20 TABLET, DELAYED RELEASE ORAL at 11:16

## 2017-06-16 RX ADMIN — FLUCONAZOLE 100 MILLIGRAM(S): 150 TABLET ORAL at 11:16

## 2017-06-16 RX ADMIN — OCTREOTIDE ACETATE 200 MICROGRAM(S): 200 INJECTION, SOLUTION INTRAVENOUS; SUBCUTANEOUS at 14:27

## 2017-06-16 RX ADMIN — ERTAPENEM SODIUM 120 MILLIGRAM(S): 1 INJECTION, POWDER, LYOPHILIZED, FOR SOLUTION INTRAMUSCULAR; INTRAVENOUS at 11:17

## 2017-06-16 NOTE — PROGRESS NOTE ADULT - ASSESSMENT
s/p hartmans and ileocolic resection for diverticulitis with controlled fistula. Discharge home with IV antibiotics and TPN

## 2017-06-16 NOTE — PROGRESS NOTE ADULT - SUBJECTIVE AND OBJECTIVE BOX
CC: perforated diverticulitis (02 Jun 2017 14:38)    HPI: 47M admitted for a 24hr h/o progressive abdominal pain that began after a 1-2 day trip to [facility]. CT with acute perforated sigmoid diverticulitis, s/p LAP with resection (Hartmanns/ileocolic resection) complicated by septic shock secondary to the above and anastomotic leak, peritonitis. S/p  pressor support In ICU.  Hospital course with HARDIK due to Hypotension and prerenal, resolved now. Started in TPN.     INTERVAL HPI/ OVERNIGHT EVENTS: no new complains, feeling well,  no abd pain. awaiting for discharge     Vital Signs Last 24 Hrs  T(C): 36.7, Max: 36.7 (06-16 @ 11:26)  T(F): 98, Max: 98 (06-16 @ 11:26)  HR: 62 (62 - 62)  BP: 106/64 (106/64 - 106/64)  BP(mean): --  RR: 16 (16 - 16)  SpO2: 100% (100% - 100%)    REVIEW OF SYSTEMS:    CONSTITUTIONAL: + generalized  weakness, NO  fevers or chills  EYES/ENT: No visual changes;  No vertigo or throat pain   NECK: No pain or stiffness  RESPIRATORY: No cough, wheezing, hemoptysis; No shortness of breath  CARDIOVASCULAR: No chest pain or palpitations  GASTROINTESTINAL: PAin controlled.  No nausea, vomiting, or hematemesis;  Ostomy output stool getting more formed.  No bleeding   GENITOURINARY: No dysuria, frequency or hematuria  NEUROLOGICAL: No numbness or weakness      All other review of systems is negative unless indicated above.      PHYSICAL EXAM:    General: Well developed;  in no acute distress  Eyes: PERRLA, EOMI; conjunctiva and sclera clear  Head: Normocephalic; atraumatic  ENMT: No nasal discharge; airway clear  Neck: Supple; non tender; no masses  Respiratory:  Good air entry b/l, No wheezes, rales or rhonchi  Cardiovascular: Regular rate and rhythm. S1 and S2 Normal; No murmurs  Gastrointestinal: Soft,  non-tender, normal BS. Ostomy in place. Drain in place. SX wound healed well   Genitourinary: No costovertebral angle tenderness  Extremities: Normal range of motion, No edema  Vascular: Peripheral pulses palpable 2+ bilaterally  Neurological: Alert and oriented x4, non focal   Skin: Warm and dry. No acute rash  Musculoskeletal: Normal tone and MS   Psychiatric: Cooperative and appropriate                          10.7   8.5   )-----------( 458      ( 15 Philip 2017 06:46 )             32.6     15 Philip 2017 06:46    138    |  103    |  16     ----------------------------<  119    4.3     |  28     |  0.80     Ca    8.4        15 Philip 2017 06:46    MEDICATIONS  (STANDING):  heparin  Injectable 5000Unit(s) SubCutaneous every 8 hours  ALBUTerol/ipratropium for Nebulization 3milliLiter(s) Nebulizer every 6 hours  fluconAZOLE   Tablet 100milliGRAM(s) Oral daily  pantoprazole  Injectable 40milliGRAM(s) IV Push daily  alteplase for catheter clearance 2milliGRAM(s) Catheter once  octreotide  Injectable 200MICROGram(s) SubCutaneous every 8 hours  ertapenem  IVPB 1000milliGRAM(s) IV Intermittent every 24 hours  Parenteral Nutrition - Adult 1Each TPN Continuous <Continuous>    MEDICATIONS  (PRN):  naloxone Injectable 0.1milliGRAM(s) IV Push every 3 minutes PRN For ANY of the following changes in patient status:  A. RR LESS THAN 10 breaths per minute, B. Oxygen saturation LESS THAN 90%, C. Sedation score of 6  ondansetron Injectable 4milliGRAM(s) IV Push every 6 hours PRN Nausea  melatonin Oral Tab/Cap - Peds 3milliGRAM(s) Oral at bedtime PRN Insomnia      RADIOLOGY & ADDITIONAL TESTS:  EXAM:  CT ABDOMEN AND PELVIS OC IC                         PROCEDURE DATE:  06/12/2017      IMPRESSION:     Interval decrease in size of contained ileocolic anastomotic leak within   the gallbladder fossa status post drainage measuring 5.3 x 1.9 cm,   previously 8.4 x 5.0 cm. Persistent air and small residual oral contrast   within the collection.    Interval decrease in perihepatic, right pericolic gutter, and pelvic free   fluid. Persistent thickened and enhancing peritoneum indicative of   peritonitis.    Persistent, but decreased free air.    No new collections.

## 2017-06-16 NOTE — ADVANCED PRACTICE NURSE CONSULT - ASSESSMENT
Rec.d order to place PICC line for TPN and access. Reviewed chart, labwork, explained all risks and benefits and obtained consent for PICC placement. Pt. A&Ox3 and verified pt.’s identification, name, , and correct procedure.  Inserted Navilyst PICC 5FD w/PASV technology via ultrasound guidance to ­­­right brachial,  number of insertion attempts: 1, cut to 38 cm. length, brisk blood return, flushes well w/20 ml. NS. Site prepped with CHG, Draped in sterile fashion using strict aseptic technique maintained throughout procedure, performed hand hygiene, all team members maintained full barrier precautions, 1% lidocaine used, Minimal blood loss. Site covered with sterile dressing and dated. No complications. Endcaps placed.  Pt. tolerated well.  All sharps and guidewires accounted for. CXR confirms placement, no pneumothorax.  Lot #        ­­­­­­[id number]_.
This is a 47 year old male that was admitted to the hospital on 5/19/2017 for diverticulitis. PMH- Fatty liver. Patient underwent surgery on 5/21/2017 with the creation of a colostomy.    Patient presents resting in bed.  Awaiting CT scan and is drinking contrast. Ostomy appliance changed over the weekend. No leaking or odor noted. Stoma red, moist and viable. Small amount of liquid stool in pouch.     Encouraging patient to review education material regarding ostomy.
This is a 47 year old male that was admitted to the hospital on 5/19/2017 for diverticulitis. PMH- Fatty liver. Patient underwent surgery on 5/21/2017 with the creation of a colostomy.    Patient presents resting in bed.  In to change ostomy appliance prior to patient being discharged. Old appliance removed.  Some stool leakage noted under wafer. Skin cleansed with water. No skin breakdown noted. Patient denies any irritation or itching. Stoma flush and oval in shape. 2 3/4" convex wafer cut to fit stoma. Barrier ring placed around wafer for more protection. Wafer placed around stoma and pouch placed. No leaking. Patient receptive to ostomy appliance change.   Patient reports he is emptying pouch independently. Ostomy supplies in bag for patient to bring home.
This is a 47 year old male that was admitted to the hospital on 5/19/2017 for diverticulitis. PMH- Fatty liver. Patient underwent surgery on 5/21/2017 with the creation of a colostomy.    Patient presents resting in bed. Educated patient on the creation and function of the colostomy. Reviewed diet, supplies and bathing. Reviewed contents of colostomy education folder. Patient reports that he has been emptying the pouch with minimal assistance. Stoma assessed through pouch and is pink, moist and viable. Watery brown stool noted to pouch. No leaking noted.     [facility] referral made. Discharge kit provided to patient to bring home. Patient remains resting in bed.

## 2017-06-16 NOTE — PROGRESS NOTE ADULT - SUBJECTIVE AND OBJECTIVE BOX
Feels well. Ready to go home    Exam:  Vital Signs Last 24 Hrs  T(C): 36.9, Max: 36.9 (06-15 @ 16:41)  T(F): 98.4, Max: 98.4 (06-15 @ 16:41)  HR: 58 (58 - 66)  BP: 109/59 (109/59 - 115/78)  RR: 16 (15 - 16)  SpO2: 99% (99% - 100%)    General: alert, in no distress  Respiratory: non labored on room air  Abdomen: soft, non tender, colostomy with stool and air, drain with stool output, incision healing well without sign of infection  Neuro: alert and oriented                          10.7   8.5   )-----------( 458      ( 15 Philip 2017 06:46 )             32.6   06-15    138  |  103  |  16  ----------------------------<  119<H>  4.3   |  28  |  0.80    Ca    8.4<L>      15 Philip 2017 06:46

## 2017-06-16 NOTE — PROGRESS NOTE ADULT - ASSESSMENT
47M admitted for a 24hr h/o progressive abdominal pain that began after a 1-2 day trip to [facility]. CT with acute perforated sigmoid diverticulitis, s/p LAP with resection (Hartmanns/ileocolic resection) complicated by septic shock secondary to the above presently off pressor support.     1. Complicated diverticulitis.  abscess of sigmoid colon.  anastomosis leak. perihepatic fluid collection. Peritonitis. Septic shock  S/p pressors   -05/21 sigmoid resection and Henry's procedure.  -05/30 IR percutaneous drainage secondary to perihepatic fluid collection.    -c/w Invanz + fluconazole (Cx (+) yeast) until 06/27.  -TPN to be continued at home.  -self administer octreotide.  - D/c planning today with HC         2. HARDIK, prerenal and ATN  - resolved with IVF resuscitation  - Renal FX at baseline     Stable for d/c home from medicine stand point

## 2017-06-16 NOTE — PROGRESS NOTE ADULT - NSHPATTENDINGPLANDISCUSS_GEN_ALL_CORE
Pt and team
Pt and team
patient and nursing/wife
patient and nursing/wife/CR team
patient and nursing

## 2017-06-16 NOTE — PROGRESS NOTE ADULT - PROVIDER SPECIALTY LIST ADULT
Colorectal Surgery
Critical Care
Gastroenterology
Gastroenterology
Hospitalist
Infectious Disease
Nephrology
Pulmonology
Surgery
Colorectal Surgery

## 2017-06-19 ENCOUNTER — OTHER (OUTPATIENT)
Age: 47
End: 2017-06-19

## 2017-06-19 PROBLEM — K76.0 FATTY (CHANGE OF) LIVER, NOT ELSEWHERE CLASSIFIED: Chronic | Status: ACTIVE | Noted: 2017-05-19

## 2017-06-20 ENCOUNTER — APPOINTMENT (OUTPATIENT)
Dept: COLORECTAL SURGERY | Facility: CLINIC | Age: 47
End: 2017-06-20

## 2017-06-20 VITALS
DIASTOLIC BLOOD PRESSURE: 62 MMHG | HEIGHT: 72 IN | TEMPERATURE: 97.8 F | BODY MASS INDEX: 24.79 KG/M2 | WEIGHT: 183 LBS | RESPIRATION RATE: 16 BRPM | HEART RATE: 70 BPM | SYSTOLIC BLOOD PRESSURE: 93 MMHG

## 2017-06-20 DIAGNOSIS — Z78.9 OTHER SPECIFIED HEALTH STATUS: ICD-10-CM

## 2017-06-20 DIAGNOSIS — K57.92 DIVERTICULITIS OF INTESTINE, PART UNSPECIFIED, W/OUT PERFORATION OR ABSCESS W/OUT BLEEDING: ICD-10-CM

## 2017-06-20 DIAGNOSIS — Z87.891 PERSONAL HISTORY OF NICOTINE DEPENDENCE: ICD-10-CM

## 2017-06-21 DIAGNOSIS — K56.69 OTHER INTESTINAL OBSTRUCTION: ICD-10-CM

## 2017-06-21 DIAGNOSIS — K57.20 DIVERTICULITIS OF LARGE INTESTINE WITH PERFORATION AND ABSCESS WITHOUT BLEEDING: ICD-10-CM

## 2017-06-21 DIAGNOSIS — E87.2 ACIDOSIS: ICD-10-CM

## 2017-06-21 DIAGNOSIS — R18.8 OTHER ASCITES: ICD-10-CM

## 2017-06-21 DIAGNOSIS — R09.02 HYPOXEMIA: ICD-10-CM

## 2017-06-21 DIAGNOSIS — F17.210 NICOTINE DEPENDENCE, CIGARETTES, UNCOMPLICATED: ICD-10-CM

## 2017-06-21 DIAGNOSIS — B96.89 OTHER SPECIFIED BACTERIAL AGENTS AS THE CAUSE OF DISEASES CLASSIFIED ELSEWHERE: ICD-10-CM

## 2017-06-21 DIAGNOSIS — R60.1 GENERALIZED EDEMA: ICD-10-CM

## 2017-06-21 DIAGNOSIS — K76.0 FATTY (CHANGE OF) LIVER, NOT ELSEWHERE CLASSIFIED: ICD-10-CM

## 2017-06-21 DIAGNOSIS — A41.9 SEPSIS, UNSPECIFIED ORGANISM: ICD-10-CM

## 2017-06-21 DIAGNOSIS — J90 PLEURAL EFFUSION, NOT ELSEWHERE CLASSIFIED: ICD-10-CM

## 2017-06-21 DIAGNOSIS — K91.89 OTHER POSTPROCEDURAL COMPLICATIONS AND DISORDERS OF DIGESTIVE SYSTEM: ICD-10-CM

## 2017-06-21 DIAGNOSIS — K63.89 OTHER SPECIFIED DISEASES OF INTESTINE: ICD-10-CM

## 2017-06-21 DIAGNOSIS — K65.9 PERITONITIS, UNSPECIFIED: ICD-10-CM

## 2017-06-21 DIAGNOSIS — N17.0 ACUTE KIDNEY FAILURE WITH TUBULAR NECROSIS: ICD-10-CM

## 2017-06-21 DIAGNOSIS — R65.21 SEVERE SEPSIS WITH SEPTIC SHOCK: ICD-10-CM

## 2017-06-28 LAB
CULTURE RESULTS: SIGNIFICANT CHANGE UP
SPECIMEN SOURCE: SIGNIFICANT CHANGE UP

## 2017-06-29 ENCOUNTER — APPOINTMENT (OUTPATIENT)
Dept: COLORECTAL SURGERY | Facility: CLINIC | Age: 47
End: 2017-06-29

## 2017-06-29 VITALS
RESPIRATION RATE: 16 BRPM | HEART RATE: 65 BPM | HEIGHT: 72 IN | WEIGHT: 179 LBS | BODY MASS INDEX: 24.24 KG/M2 | TEMPERATURE: 98.3 F | SYSTOLIC BLOOD PRESSURE: 100 MMHG | DIASTOLIC BLOOD PRESSURE: 65 MMHG

## 2017-07-03 ENCOUNTER — MESSAGE (OUTPATIENT)
Age: 47
End: 2017-07-03

## 2017-07-05 LAB
CULTURE RESULTS: SIGNIFICANT CHANGE UP
SPECIMEN SOURCE: SIGNIFICANT CHANGE UP

## 2017-07-10 ENCOUNTER — OUTPATIENT (OUTPATIENT)
Dept: OUTPATIENT SERVICES | Facility: HOSPITAL | Age: 47
LOS: 1 days | Discharge: HOME | End: 2017-07-10
Payer: COMMERCIAL

## 2017-07-10 DIAGNOSIS — K91.89 OTHER POSTPROCEDURAL COMPLICATIONS AND DISORDERS OF DIGESTIVE SYSTEM: ICD-10-CM

## 2017-07-10 PROCEDURE — 74177 CT ABD & PELVIS W/CONTRAST: CPT | Mod: 26

## 2017-07-12 LAB
CULTURE RESULTS: SIGNIFICANT CHANGE UP
SPECIMEN SOURCE: SIGNIFICANT CHANGE UP

## 2017-07-14 ENCOUNTER — APPOINTMENT (OUTPATIENT)
Dept: COLORECTAL SURGERY | Facility: CLINIC | Age: 47
End: 2017-07-14

## 2017-07-14 VITALS
HEART RATE: 60 BPM | RESPIRATION RATE: 16 BRPM | SYSTOLIC BLOOD PRESSURE: 105 MMHG | HEIGHT: 72 IN | TEMPERATURE: 97.8 F | DIASTOLIC BLOOD PRESSURE: 67 MMHG

## 2017-07-24 ENCOUNTER — APPOINTMENT (OUTPATIENT)
Dept: COLORECTAL SURGERY | Facility: CLINIC | Age: 47
End: 2017-07-24

## 2017-07-28 ENCOUNTER — OTHER (OUTPATIENT)
Age: 47
End: 2017-07-28

## 2017-07-31 ENCOUNTER — APPOINTMENT (OUTPATIENT)
Dept: COLORECTAL SURGERY | Facility: CLINIC | Age: 47
End: 2017-07-31

## 2017-09-25 ENCOUNTER — APPOINTMENT (OUTPATIENT)
Dept: COLORECTAL SURGERY | Facility: CLINIC | Age: 47
End: 2017-09-25
Payer: COMMERCIAL

## 2017-09-25 VITALS
RESPIRATION RATE: 16 BRPM | HEART RATE: 64 BPM | TEMPERATURE: 98.3 F | BODY MASS INDEX: 26.01 KG/M2 | DIASTOLIC BLOOD PRESSURE: 67 MMHG | SYSTOLIC BLOOD PRESSURE: 114 MMHG | WEIGHT: 192 LBS | HEIGHT: 72 IN

## 2017-09-25 PROCEDURE — 99214 OFFICE O/P EST MOD 30 MIN: CPT

## 2017-10-19 ENCOUNTER — OUTPATIENT (OUTPATIENT)
Dept: OUTPATIENT SERVICES | Facility: HOSPITAL | Age: 47
LOS: 1 days | Discharge: HOME | End: 2017-10-19
Payer: COMMERCIAL

## 2017-10-19 VITALS
OXYGEN SATURATION: 98 % | HEART RATE: 62 BPM | RESPIRATION RATE: 18 BRPM | TEMPERATURE: 98 F | DIASTOLIC BLOOD PRESSURE: 65 MMHG | SYSTOLIC BLOOD PRESSURE: 111 MMHG | HEIGHT: 71 IN | WEIGHT: 197.98 LBS

## 2017-10-19 DIAGNOSIS — Z41.9 ENCOUNTER FOR PROCEDURE FOR PURPOSES OTHER THAN REMEDYING HEALTH STATE, UNSPECIFIED: Chronic | ICD-10-CM

## 2017-10-19 DIAGNOSIS — K91.89 OTHER POSTPROCEDURAL COMPLICATIONS AND DISORDERS OF DIGESTIVE SYSTEM: ICD-10-CM

## 2017-10-19 DIAGNOSIS — Z43.3 ENCOUNTER FOR ATTENTION TO COLOSTOMY: ICD-10-CM

## 2017-10-19 LAB
ANION GAP SERPL CALC-SCNC: 7 MMOL/L — SIGNIFICANT CHANGE UP (ref 5–17)
APTT BLD: 27.5 SEC — SIGNIFICANT CHANGE UP (ref 27.5–37.4)
BASOPHILS # BLD AUTO: 0.1 K/UL — SIGNIFICANT CHANGE UP (ref 0–0.2)
BASOPHILS NFR BLD AUTO: 1.2 % — SIGNIFICANT CHANGE UP (ref 0–2)
BUN SERPL-MCNC: 15 MG/DL — SIGNIFICANT CHANGE UP (ref 7–23)
CALCIUM SERPL-MCNC: 9 MG/DL — SIGNIFICANT CHANGE UP (ref 8.5–10.1)
CHLORIDE SERPL-SCNC: 106 MMOL/L — SIGNIFICANT CHANGE UP (ref 96–108)
CO2 SERPL-SCNC: 27 MMOL/L — SIGNIFICANT CHANGE UP (ref 22–31)
CREAT SERPL-MCNC: 1.13 MG/DL — SIGNIFICANT CHANGE UP (ref 0.5–1.3)
EOSINOPHIL # BLD AUTO: 0.1 K/UL — SIGNIFICANT CHANGE UP (ref 0–0.5)
EOSINOPHIL NFR BLD AUTO: 1.5 % — SIGNIFICANT CHANGE UP (ref 0–6)
GLUCOSE SERPL-MCNC: 82 MG/DL — SIGNIFICANT CHANGE UP (ref 70–99)
HCT VFR BLD CALC: 46.5 % — SIGNIFICANT CHANGE UP (ref 39–50)
HGB BLD-MCNC: 15.7 G/DL — SIGNIFICANT CHANGE UP (ref 13–17)
INR BLD: 0.99 RATIO — SIGNIFICANT CHANGE UP (ref 0.88–1.16)
LYMPHOCYTES # BLD AUTO: 2.3 K/UL — SIGNIFICANT CHANGE UP (ref 1–3.3)
LYMPHOCYTES # BLD AUTO: 35.6 % — SIGNIFICANT CHANGE UP (ref 13–44)
MCHC RBC-ENTMCNC: 28.9 PG — SIGNIFICANT CHANGE UP (ref 27–34)
MCHC RBC-ENTMCNC: 33.8 GM/DL — SIGNIFICANT CHANGE UP (ref 32–36)
MCV RBC AUTO: 85.6 FL — SIGNIFICANT CHANGE UP (ref 80–100)
MONOCYTES # BLD AUTO: 0.6 K/UL — SIGNIFICANT CHANGE UP (ref 0–0.9)
MONOCYTES NFR BLD AUTO: 8.6 % — SIGNIFICANT CHANGE UP (ref 2–14)
NEUTROPHILS # BLD AUTO: 3.5 K/UL — SIGNIFICANT CHANGE UP (ref 1.8–7.4)
NEUTROPHILS NFR BLD AUTO: 53.1 % — SIGNIFICANT CHANGE UP (ref 43–77)
PLATELET # BLD AUTO: 223 K/UL — SIGNIFICANT CHANGE UP (ref 150–400)
POTASSIUM SERPL-MCNC: 4.3 MMOL/L — SIGNIFICANT CHANGE UP (ref 3.5–5.3)
POTASSIUM SERPL-SCNC: 4.3 MMOL/L — SIGNIFICANT CHANGE UP (ref 3.5–5.3)
PROTHROM AB SERPL-ACNC: 10.7 SEC — SIGNIFICANT CHANGE UP (ref 9.8–12.7)
RBC # BLD: 5.43 M/UL — SIGNIFICANT CHANGE UP (ref 4.2–5.8)
RBC # FLD: 12.3 % — SIGNIFICANT CHANGE UP (ref 10.3–14.5)
SODIUM SERPL-SCNC: 140 MMOL/L — SIGNIFICANT CHANGE UP (ref 135–145)
WBC # BLD: 6.5 K/UL — SIGNIFICANT CHANGE UP (ref 3.8–10.5)
WBC # FLD AUTO: 6.5 K/UL — SIGNIFICANT CHANGE UP (ref 3.8–10.5)

## 2017-10-19 PROCEDURE — 93010 ELECTROCARDIOGRAM REPORT: CPT

## 2017-10-19 NOTE — H&P PST ADULT - HISTORY OF PRESENT ILLNESS
47 years old male with diverticular disease. He had a colon resection with Colostomy 5/19/2016. Present to [facility] prior to Colonoscopy. Planned reversal.

## 2017-10-19 NOTE — H&P PST ADULT - PMH
Colostomy in place    Diverticular disease  surgery 5/2017  Fatty liver    VRE (vancomycin resistant enterococcus) culture positive  History of

## 2017-10-19 NOTE — H&P PST ADULT - ASSESSMENT
47 years old male present to PST prior to Colonoscopy.  Plan  1. Expect a call from Endoscopy the day before your procedure between 11am and 3pm.  2. Follow the GI doctor's instructions for preparation  and day before procedure activities.  3. Follow the GI doctor's  instructions for medications.

## 2017-10-25 ENCOUNTER — OUTPATIENT (OUTPATIENT)
Dept: OUTPATIENT SERVICES | Facility: HOSPITAL | Age: 47
LOS: 1 days | Discharge: HOME | End: 2017-10-25

## 2017-10-25 ENCOUNTER — APPOINTMENT (OUTPATIENT)
Dept: COLORECTAL SURGERY | Facility: HOSPITAL | Age: 47
End: 2017-10-25
Payer: COMMERCIAL

## 2017-10-25 VITALS
DIASTOLIC BLOOD PRESSURE: 71 MMHG | WEIGHT: 195.11 LBS | SYSTOLIC BLOOD PRESSURE: 111 MMHG | OXYGEN SATURATION: 98 % | TEMPERATURE: 98 F | HEART RATE: 53 BPM | RESPIRATION RATE: 16 BRPM | HEIGHT: 72 IN

## 2017-10-25 DIAGNOSIS — Z41.9 ENCOUNTER FOR PROCEDURE FOR PURPOSES OTHER THAN REMEDYING HEALTH STATE, UNSPECIFIED: Chronic | ICD-10-CM

## 2017-10-25 PROCEDURE — 45388 COLONOSCOPY W/ABLATION: CPT

## 2017-10-25 RX ORDER — FAMOTIDINE 10 MG/ML
20 INJECTION INTRAVENOUS ONCE
Qty: 0 | Refills: 0 | Status: COMPLETED | OUTPATIENT
Start: 2017-10-26 | End: 2017-10-26

## 2017-10-25 RX ORDER — ALVIMOPAN 12 MG/1
12 CAPSULE ORAL ONCE
Qty: 0 | Refills: 0 | Status: COMPLETED | OUTPATIENT
Start: 2017-10-26 | End: 2017-10-26

## 2017-10-25 RX ORDER — FENTANYL CITRATE 50 UG/ML
50 INJECTION INTRAVENOUS
Qty: 0 | Refills: 0 | Status: DISCONTINUED | OUTPATIENT
Start: 2017-10-26 | End: 2017-10-26

## 2017-10-25 RX ORDER — SODIUM CHLORIDE 9 MG/ML
3 INJECTION INTRAMUSCULAR; INTRAVENOUS; SUBCUTANEOUS EVERY 8 HOURS
Qty: 0 | Refills: 0 | Status: DISCONTINUED | OUTPATIENT
Start: 2017-10-26 | End: 2017-10-26

## 2017-10-25 RX ORDER — SODIUM CHLORIDE 9 MG/ML
1000 INJECTION, SOLUTION INTRAVENOUS
Qty: 0 | Refills: 0 | Status: DISCONTINUED | OUTPATIENT
Start: 2017-10-26 | End: 2017-10-26

## 2017-10-25 RX ORDER — SODIUM CHLORIDE 9 MG/ML
1000 INJECTION INTRAMUSCULAR; INTRAVENOUS; SUBCUTANEOUS
Qty: 0 | Refills: 0 | Status: DISCONTINUED | OUTPATIENT
Start: 2017-10-25 | End: 2017-11-10

## 2017-10-25 RX ORDER — CEFOTETAN DISODIUM 1 G
2 VIAL (EA) INJECTION ONCE
Qty: 0 | Refills: 0 | Status: DISCONTINUED | OUTPATIENT
Start: 2017-10-26 | End: 2017-10-31

## 2017-10-25 RX ADMIN — SODIUM CHLORIDE 75 MILLILITER(S): 9 INJECTION INTRAMUSCULAR; INTRAVENOUS; SUBCUTANEOUS at 09:58

## 2017-10-26 ENCOUNTER — OTHER (OUTPATIENT)
Age: 47
End: 2017-10-26

## 2017-10-26 ENCOUNTER — RESULT REVIEW (OUTPATIENT)
Age: 47
End: 2017-10-26

## 2017-10-26 ENCOUNTER — APPOINTMENT (OUTPATIENT)
Dept: UROLOGY | Facility: HOSPITAL | Age: 47
End: 2017-10-26
Payer: COMMERCIAL

## 2017-10-26 ENCOUNTER — APPOINTMENT (OUTPATIENT)
Dept: COLORECTAL SURGERY | Facility: HOSPITAL | Age: 47
End: 2017-10-26

## 2017-10-26 ENCOUNTER — INPATIENT (INPATIENT)
Facility: HOSPITAL | Age: 47
LOS: 4 days | Discharge: HOME | End: 2017-10-31
Attending: COLON & RECTAL SURGERY | Admitting: COLON & RECTAL SURGERY
Payer: COMMERCIAL

## 2017-10-26 VITALS
HEIGHT: 72 IN | DIASTOLIC BLOOD PRESSURE: 64 MMHG | WEIGHT: 197.98 LBS | SYSTOLIC BLOOD PRESSURE: 111 MMHG | RESPIRATION RATE: 16 BRPM | OXYGEN SATURATION: 97 % | HEART RATE: 60 BPM | TEMPERATURE: 98 F

## 2017-10-26 DIAGNOSIS — Z41.9 ENCOUNTER FOR PROCEDURE FOR PURPOSES OTHER THAN REMEDYING HEALTH STATE, UNSPECIFIED: Chronic | ICD-10-CM

## 2017-10-26 LAB
ABO RH CONFIRMATION: SIGNIFICANT CHANGE UP
ALLERGY+IMMUNOLOGY DIAG STUDY NOTE: SIGNIFICANT CHANGE UP
GLUCOSE BLDC GLUCOMTR-MCNC: 107 MG/DL — HIGH (ref 70–99)
GLUCOSE BLDC GLUCOMTR-MCNC: 120 MG/DL — HIGH (ref 70–99)
GLUCOSE BLDC GLUCOMTR-MCNC: 82 MG/DL — SIGNIFICANT CHANGE UP (ref 70–99)
GLUCOSE BLDC GLUCOMTR-MCNC: 82 MG/DL — SIGNIFICANT CHANGE UP (ref 70–99)
HBA1C BLD-MCNC: 5.4 % — SIGNIFICANT CHANGE UP (ref 4–5.6)

## 2017-10-26 PROCEDURE — 88304 TISSUE EXAM BY PATHOLOGIST: CPT | Mod: 26

## 2017-10-26 PROCEDURE — 52332 CYSTOSCOPY AND TREATMENT: CPT | Mod: 50

## 2017-10-26 PROCEDURE — 88305 TISSUE EXAM BY PATHOLOGIST: CPT | Mod: 26

## 2017-10-26 PROCEDURE — 45300 PROCTOSIGMOIDOSCOPY DX: CPT

## 2017-10-26 PROCEDURE — 44620 REPAIR BOWEL OPENING: CPT

## 2017-10-26 RX ORDER — ALVIMOPAN 12 MG/1
12 CAPSULE ORAL
Qty: 0 | Refills: 0 | Status: DISCONTINUED | OUTPATIENT
Start: 2017-10-26 | End: 2017-10-31

## 2017-10-26 RX ORDER — ONDANSETRON 8 MG/1
4 TABLET, FILM COATED ORAL EVERY 6 HOURS
Qty: 0 | Refills: 0 | Status: DISCONTINUED | OUTPATIENT
Start: 2017-10-26 | End: 2017-10-31

## 2017-10-26 RX ORDER — PANTOPRAZOLE SODIUM 20 MG/1
40 TABLET, DELAYED RELEASE ORAL DAILY
Qty: 0 | Refills: 0 | Status: DISCONTINUED | OUTPATIENT
Start: 2017-10-26 | End: 2017-10-31

## 2017-10-26 RX ORDER — HYDROMORPHONE HYDROCHLORIDE 2 MG/ML
30 INJECTION INTRAMUSCULAR; INTRAVENOUS; SUBCUTANEOUS
Qty: 0 | Refills: 0 | Status: DISCONTINUED | OUTPATIENT
Start: 2017-10-26 | End: 2017-10-29

## 2017-10-26 RX ORDER — CEFOTETAN DISODIUM 1 G
1 VIAL (EA) INJECTION ONCE
Qty: 0 | Refills: 0 | Status: COMPLETED | OUTPATIENT
Start: 2017-10-26 | End: 2017-10-26

## 2017-10-26 RX ORDER — ONDANSETRON 8 MG/1
4 TABLET, FILM COATED ORAL ONCE
Qty: 0 | Refills: 0 | Status: DISCONTINUED | OUTPATIENT
Start: 2017-10-26 | End: 2017-10-26

## 2017-10-26 RX ORDER — BENZOCAINE 10 %
1 GEL (GRAM) MUCOUS MEMBRANE
Qty: 0 | Refills: 0 | Status: DISCONTINUED | OUTPATIENT
Start: 2017-10-26 | End: 2017-10-31

## 2017-10-26 RX ORDER — CELECOXIB 200 MG/1
200 CAPSULE ORAL ONCE
Qty: 0 | Refills: 0 | Status: COMPLETED | OUTPATIENT
Start: 2017-10-26 | End: 2017-10-26

## 2017-10-26 RX ORDER — INFLUENZA VIRUS VACCINE 15; 15; 15; 15 UG/.5ML; UG/.5ML; UG/.5ML; UG/.5ML
0.5 SUSPENSION INTRAMUSCULAR ONCE
Qty: 0 | Refills: 0 | Status: COMPLETED | OUTPATIENT
Start: 2017-10-26 | End: 2017-10-26

## 2017-10-26 RX ORDER — OXYCODONE HYDROCHLORIDE 5 MG/1
10 TABLET ORAL EVERY 6 HOURS
Qty: 0 | Refills: 0 | Status: DISCONTINUED | OUTPATIENT
Start: 2017-10-26 | End: 2017-10-26

## 2017-10-26 RX ORDER — NALOXONE HYDROCHLORIDE 4 MG/.1ML
0.1 SPRAY NASAL
Qty: 0 | Refills: 0 | Status: DISCONTINUED | OUTPATIENT
Start: 2017-10-26 | End: 2017-10-31

## 2017-10-26 RX ORDER — ENOXAPARIN SODIUM 100 MG/ML
40 INJECTION SUBCUTANEOUS DAILY
Qty: 0 | Refills: 0 | Status: DISCONTINUED | OUTPATIENT
Start: 2017-10-27 | End: 2017-10-31

## 2017-10-26 RX ORDER — CEFOTETAN DISODIUM 1 G
VIAL (EA) INJECTION
Qty: 0 | Refills: 0 | Status: COMPLETED | OUTPATIENT
Start: 2017-10-26 | End: 2017-10-27

## 2017-10-26 RX ORDER — HEPARIN SODIUM 5000 [USP'U]/ML
5000 INJECTION INTRAVENOUS; SUBCUTANEOUS ONCE
Qty: 0 | Refills: 0 | Status: COMPLETED | OUTPATIENT
Start: 2017-10-26 | End: 2017-10-26

## 2017-10-26 RX ORDER — CEFOTETAN DISODIUM 1 G
1 VIAL (EA) INJECTION EVERY 12 HOURS
Qty: 0 | Refills: 0 | Status: COMPLETED | OUTPATIENT
Start: 2017-10-27 | End: 2017-10-27

## 2017-10-26 RX ORDER — SODIUM CHLORIDE 9 MG/ML
1000 INJECTION INTRAMUSCULAR; INTRAVENOUS; SUBCUTANEOUS
Qty: 0 | Refills: 0 | Status: DISCONTINUED | OUTPATIENT
Start: 2017-10-26 | End: 2017-10-27

## 2017-10-26 RX ORDER — ACETAMINOPHEN 500 MG
650 TABLET ORAL EVERY 6 HOURS
Qty: 0 | Refills: 0 | Status: DISCONTINUED | OUTPATIENT
Start: 2017-10-26 | End: 2017-10-31

## 2017-10-26 RX ORDER — ACETAMINOPHEN 500 MG
1000 TABLET ORAL ONCE
Qty: 0 | Refills: 0 | Status: COMPLETED | OUTPATIENT
Start: 2017-10-26 | End: 2017-10-26

## 2017-10-26 RX ADMIN — FENTANYL CITRATE 50 MICROGRAM(S): 50 INJECTION INTRAVENOUS at 13:20

## 2017-10-26 RX ADMIN — SODIUM CHLORIDE 125 MILLILITER(S): 9 INJECTION INTRAMUSCULAR; INTRAVENOUS; SUBCUTANEOUS at 16:21

## 2017-10-26 RX ADMIN — Medication 120 GRAM(S): at 16:21

## 2017-10-26 RX ADMIN — CELECOXIB 200 MILLIGRAM(S): 200 CAPSULE ORAL at 07:18

## 2017-10-26 RX ADMIN — HYDROMORPHONE HYDROCHLORIDE 30 MILLILITER(S): 2 INJECTION INTRAMUSCULAR; INTRAVENOUS; SUBCUTANEOUS at 14:17

## 2017-10-26 RX ADMIN — ALVIMOPAN 12 MILLIGRAM(S): 12 CAPSULE ORAL at 07:18

## 2017-10-26 RX ADMIN — HEPARIN SODIUM 5000 UNIT(S): 5000 INJECTION INTRAVENOUS; SUBCUTANEOUS at 07:19

## 2017-10-26 RX ADMIN — SODIUM CHLORIDE 75 MILLILITER(S): 9 INJECTION, SOLUTION INTRAVENOUS at 13:22

## 2017-10-26 RX ADMIN — Medication 400 MILLIGRAM(S): at 14:27

## 2017-10-26 RX ADMIN — FAMOTIDINE 20 MILLIGRAM(S): 10 INJECTION INTRAVENOUS at 07:18

## 2017-10-26 NOTE — BRIEF OPERATIVE NOTE - PROCEDURE
<<-----Click on this checkbox to enter Procedure Rigid proctosigmoidoscopy  10/26/2017    Active  [email or web address]  Myocutaneous flap of abdominal wall  10/26/2017    Active  SSHIH1  Closure of colostomy  10/26/2017    Active  [email or web address]  Lysis of adhesions  10/26/2017    Active  [email or web address]  Exploratory laparotomy  10/26/2017    Active  [email or web address]

## 2017-10-26 NOTE — BRIEF OPERATIVE NOTE - OPERATION/FINDINGS
bilateral ureteral stents in good position
extensive adhesions of SB to anterior abdominal wall; negative air leak test x 3, rigid proctosigmoidoscopy with anastomosis at ~ 18cm

## 2017-10-26 NOTE — BRIEF OPERATIVE NOTE - PROCEDURE
<<-----Click on this checkbox to enter Procedure Cystoscopy with insertion of ureteral stent in adult  10/26/2017  cystoscopy bilateral ureteral stent placement  [id number]

## 2017-10-26 NOTE — PROGRESS NOTE ADULT - SUBJECTIVE AND OBJECTIVE BOX
Nurse called for the Pt s/p Colostomy reversal noticed TORI dressing not sealed properly. Pt seen and examined at bedside. No complaints. Check dressing for leak found LUIS M drain site and reversal colostomy site leaking air. Dressing reinforced with Tegaderm  and working properly at present.

## 2017-10-27 LAB
ANION GAP SERPL CALC-SCNC: 8 MMOL/L — SIGNIFICANT CHANGE UP (ref 5–17)
BUN SERPL-MCNC: 11 MG/DL — SIGNIFICANT CHANGE UP (ref 7–23)
CALCIUM SERPL-MCNC: 8.1 MG/DL — LOW (ref 8.5–10.1)
CHLORIDE SERPL-SCNC: 106 MMOL/L — SIGNIFICANT CHANGE UP (ref 96–108)
CO2 SERPL-SCNC: 25 MMOL/L — SIGNIFICANT CHANGE UP (ref 22–31)
CREAT SERPL-MCNC: 1.38 MG/DL — HIGH (ref 0.5–1.3)
GLUCOSE SERPL-MCNC: 94 MG/DL — SIGNIFICANT CHANGE UP (ref 70–99)
HCT VFR BLD CALC: 42.5 % — SIGNIFICANT CHANGE UP (ref 39–50)
HGB BLD-MCNC: 14.6 G/DL — SIGNIFICANT CHANGE UP (ref 13–17)
MCHC RBC-ENTMCNC: 29.7 PG — SIGNIFICANT CHANGE UP (ref 27–34)
MCHC RBC-ENTMCNC: 34.3 GM/DL — SIGNIFICANT CHANGE UP (ref 32–36)
MCV RBC AUTO: 86.4 FL — SIGNIFICANT CHANGE UP (ref 80–100)
PLATELET # BLD AUTO: 189 K/UL — SIGNIFICANT CHANGE UP (ref 150–400)
POTASSIUM SERPL-MCNC: 4 MMOL/L — SIGNIFICANT CHANGE UP (ref 3.5–5.3)
POTASSIUM SERPL-SCNC: 4 MMOL/L — SIGNIFICANT CHANGE UP (ref 3.5–5.3)
RBC # BLD: 4.92 M/UL — SIGNIFICANT CHANGE UP (ref 4.2–5.8)
RBC # FLD: 12.6 % — SIGNIFICANT CHANGE UP (ref 10.3–14.5)
SODIUM SERPL-SCNC: 139 MMOL/L — SIGNIFICANT CHANGE UP (ref 135–145)
WBC # BLD: 11.7 K/UL — HIGH (ref 3.8–10.5)
WBC # FLD AUTO: 11.7 K/UL — HIGH (ref 3.8–10.5)

## 2017-10-27 RX ORDER — SODIUM CHLORIDE 9 MG/ML
1000 INJECTION INTRAMUSCULAR; INTRAVENOUS; SUBCUTANEOUS
Qty: 0 | Refills: 0 | Status: DISCONTINUED | OUTPATIENT
Start: 2017-10-27 | End: 2017-10-29

## 2017-10-27 RX ADMIN — ENOXAPARIN SODIUM 40 MILLIGRAM(S): 100 INJECTION SUBCUTANEOUS at 11:32

## 2017-10-27 RX ADMIN — ALVIMOPAN 12 MILLIGRAM(S): 12 CAPSULE ORAL at 17:15

## 2017-10-27 RX ADMIN — SODIUM CHLORIDE 150 MILLILITER(S): 9 INJECTION INTRAMUSCULAR; INTRAVENOUS; SUBCUTANEOUS at 10:22

## 2017-10-27 RX ADMIN — ALVIMOPAN 12 MILLIGRAM(S): 12 CAPSULE ORAL at 05:28

## 2017-10-27 RX ADMIN — PANTOPRAZOLE SODIUM 40 MILLIGRAM(S): 20 TABLET, DELAYED RELEASE ORAL at 11:32

## 2017-10-27 RX ADMIN — SODIUM CHLORIDE 150 MILLILITER(S): 9 INJECTION INTRAMUSCULAR; INTRAVENOUS; SUBCUTANEOUS at 17:15

## 2017-10-27 RX ADMIN — Medication 120 GRAM(S): at 05:28

## 2017-10-27 RX ADMIN — Medication 120 GRAM(S): at 17:15

## 2017-10-27 NOTE — PROGRESS NOTE ADULT - SUBJECTIVE AND OBJECTIVE BOX
No acute overnight events. Continued hematuria though good UOP. Pain controlled, no flatus yet.     MEDICATIONS  (STANDING):  alvimopan 12 milliGRAM(s) Oral two times a day  cefoTEtan  IVPB      cefoTEtan  IVPB 1 Gram(s) IV Intermittent every 12 hours  cefoTEtan  IVPB 2 Gram(s) IV Intermittent once  enoxaparin Injectable 40 milliGRAM(s) SubCutaneous daily  HYDROmorphone PCA (1 mG/mL) 30 milliLiter(s) PCA Continuous PCA Continuous  influenza   Vaccine 0.5 milliLiter(s) IntraMuscular once  pantoprazole  Injectable 40 milliGRAM(s) IV Push daily  sodium chloride 0.9%. 1000 milliLiter(s) (150 mL/Hr) IV Continuous <Continuous>    MEDICATIONS  (PRN):  acetaminophen   Tablet 650 milliGRAM(s) Oral every 6 hours PRN For Temp greater than 38.5 C (101.3 F)  benzocaine 20% Spray 1 Spray(s) Topical every 3 hours PRN throat irritation  naloxone Injectable 0.1 milliGRAM(s) IV Push every 3 minutes PRN For ANY of the following changes in patient status:  A. RR LESS THAN 10 breaths per minute, B. Oxygen saturation LESS THAN 90%, C. Sedation score of 6  ondansetron Injectable 4 milliGRAM(s) IV Push every 6 hours PRN Nausea  ondansetron Injectable 4 milliGRAM(s) IV Push every 6 hours PRN Nausea and/or Vomiting    Vital Signs Last 24 Hrs  T(C): 36.7 (27 Oct 2017 05:12), Max: 37.7 (26 Oct 2017 21:52)  T(F): 98.1 (27 Oct 2017 05:12), Max: 99.8 (26 Oct 2017 21:52)  HR: 64 (27 Oct 2017 05:12) (58 - 90)  BP: 123/64 (27 Oct 2017 05:12) (106/52 - 124/62)  BP(mean): --  RR: 17 (27 Oct 2017 01:00) (11 - 17)  SpO2: 99% (27 Oct 2017 05:12) (97% - 100%)  I&O's Detail    26 Oct 2017 07:01  -  27 Oct 2017 07:00  --------------------------------------------------------  IN:    lactated ringers.: 105 mL    Other: 2000 mL  Total IN: 2105 mL    OUT:    Bulb: 270 mL    Indwelling Catheter - Urethral: 1030 mL    Nasoenteral Tube: 300 mL    Other: 100 mL  Total OUT: 1700 mL    Total NET: 405 mL    NAD  ABD exam :nellie in place, soft, approp tender, mild distention                        14.6   11.7  )-----------( 189      ( 27 Oct 2017 07:38 )             42.5     10-27    139  |  106  |  11  ----------------------------<  94  4.0   |  25  |  1.38<H>    Ca    8.1<L>      27 Oct 2017 07:38

## 2017-10-27 NOTE — PROGRESS NOTE ADULT - ASSESSMENT
POD 1    Cont PCA Dilaudid. Hold on toradol until Cr begins to decrease.  HD stable, no issues.  Encourage IS and OOB.  Cont NGT for an additional day given extensive CLIVE. Await bowel function.  Good UOP though hematuria present. Will increase IVF to help resolve more quickly and also for bump in Cr.  Afebrile, mild leukocytosis. cefotetan for 24hrs.

## 2017-10-28 LAB
ANION GAP SERPL CALC-SCNC: 7 MMOL/L — SIGNIFICANT CHANGE UP (ref 5–17)
BUN SERPL-MCNC: 10 MG/DL — SIGNIFICANT CHANGE UP (ref 7–23)
CALCIUM SERPL-MCNC: 8.2 MG/DL — LOW (ref 8.5–10.1)
CHLORIDE SERPL-SCNC: 105 MMOL/L — SIGNIFICANT CHANGE UP (ref 96–108)
CO2 SERPL-SCNC: 27 MMOL/L — SIGNIFICANT CHANGE UP (ref 22–31)
CREAT SERPL-MCNC: 0.83 MG/DL — SIGNIFICANT CHANGE UP (ref 0.5–1.3)
GLUCOSE SERPL-MCNC: 75 MG/DL — SIGNIFICANT CHANGE UP (ref 70–99)
HCT VFR BLD CALC: 38.6 % — LOW (ref 39–50)
HGB BLD-MCNC: 13.1 G/DL — SIGNIFICANT CHANGE UP (ref 13–17)
MCHC RBC-ENTMCNC: 29.2 PG — SIGNIFICANT CHANGE UP (ref 27–34)
MCHC RBC-ENTMCNC: 33.9 GM/DL — SIGNIFICANT CHANGE UP (ref 32–36)
MCV RBC AUTO: 86.1 FL — SIGNIFICANT CHANGE UP (ref 80–100)
PLATELET # BLD AUTO: 189 K/UL — SIGNIFICANT CHANGE UP (ref 150–400)
POTASSIUM SERPL-MCNC: 3.7 MMOL/L — SIGNIFICANT CHANGE UP (ref 3.5–5.3)
POTASSIUM SERPL-SCNC: 3.7 MMOL/L — SIGNIFICANT CHANGE UP (ref 3.5–5.3)
RBC # BLD: 4.48 M/UL — SIGNIFICANT CHANGE UP (ref 4.2–5.8)
RBC # FLD: 12.5 % — SIGNIFICANT CHANGE UP (ref 10.3–14.5)
SODIUM SERPL-SCNC: 139 MMOL/L — SIGNIFICANT CHANGE UP (ref 135–145)
WBC # BLD: 9.5 K/UL — SIGNIFICANT CHANGE UP (ref 3.8–10.5)
WBC # FLD AUTO: 9.5 K/UL — SIGNIFICANT CHANGE UP (ref 3.8–10.5)

## 2017-10-28 RX ADMIN — ALVIMOPAN 12 MILLIGRAM(S): 12 CAPSULE ORAL at 17:33

## 2017-10-28 RX ADMIN — SODIUM CHLORIDE 150 MILLILITER(S): 9 INJECTION INTRAMUSCULAR; INTRAVENOUS; SUBCUTANEOUS at 22:11

## 2017-10-28 RX ADMIN — SODIUM CHLORIDE 150 MILLILITER(S): 9 INJECTION INTRAMUSCULAR; INTRAVENOUS; SUBCUTANEOUS at 06:26

## 2017-10-28 RX ADMIN — PANTOPRAZOLE SODIUM 40 MILLIGRAM(S): 20 TABLET, DELAYED RELEASE ORAL at 11:41

## 2017-10-28 RX ADMIN — ALVIMOPAN 12 MILLIGRAM(S): 12 CAPSULE ORAL at 06:24

## 2017-10-28 RX ADMIN — ENOXAPARIN SODIUM 40 MILLIGRAM(S): 100 INJECTION SUBCUTANEOUS at 11:41

## 2017-10-28 RX ADMIN — SODIUM CHLORIDE 150 MILLILITER(S): 9 INJECTION INTRAMUSCULAR; INTRAVENOUS; SUBCUTANEOUS at 00:01

## 2017-10-28 RX ADMIN — SODIUM CHLORIDE 150 MILLILITER(S): 9 INJECTION INTRAMUSCULAR; INTRAVENOUS; SUBCUTANEOUS at 12:52

## 2017-10-28 NOTE — PROGRESS NOTE ADULT - ASSESSMENT
POD 2 s/p colostomy reversal and lysis of adhesions. Doing well    Remain NPO and continue NGT  remove [place]  IVFs  Ambulate and IS  PCA for pain  VTE prophylaxis

## 2017-10-28 NOTE — PROGRESS NOTE ADULT - SUBJECTIVE AND OBJECTIVE BOX
Feels well this morning. No nausea or emesis. Denies fevers or chills. No bowel function yet    Exam:  Vital Signs Last 24 Hrs  T(C): 36.9 (28 Oct 2017 05:28), Max: 37.2 (27 Oct 2017 21:08)  T(F): 98.5 (28 Oct 2017 05:28), Max: 99 (27 Oct 2017 21:08)  HR: 76 (28 Oct 2017 05:28) (71 - 80)  BP: 125/61 (28 Oct 2017 05:28) (110/58 - 125/61)  RR: 16 (28 Oct 2017 05:28) (16 - 17)  SpO2: 98% (28 Oct 2017 05:28) (98% - 100%)    General: alert, in no distress  Respiratory: non labored breathing  Abdomen: soft, mild distension, appropriately tender, TORI in place, saturated dressing over colostomy site. LUIS M with scant SS output  Neuro: alert and oriented x 3                          13.1   9.5   )-----------( 189      ( 28 Oct 2017 07:21 )             38.6   10-28    139  |  105  |  10  ----------------------------<  75  3.7   |  27  |  0.83    Ca    8.2<L>      28 Oct 2017 07:21

## 2017-10-29 LAB
ANION GAP SERPL CALC-SCNC: 8 MMOL/L — SIGNIFICANT CHANGE UP (ref 5–17)
BUN SERPL-MCNC: 10 MG/DL — SIGNIFICANT CHANGE UP (ref 7–23)
CALCIUM SERPL-MCNC: 8.1 MG/DL — LOW (ref 8.5–10.1)
CHLORIDE SERPL-SCNC: 105 MMOL/L — SIGNIFICANT CHANGE UP (ref 96–108)
CO2 SERPL-SCNC: 27 MMOL/L — SIGNIFICANT CHANGE UP (ref 22–31)
CREAT SERPL-MCNC: 0.7 MG/DL — SIGNIFICANT CHANGE UP (ref 0.5–1.3)
GLUCOSE SERPL-MCNC: 68 MG/DL — LOW (ref 70–99)
HCT VFR BLD CALC: 35.2 % — LOW (ref 39–50)
HGB BLD-MCNC: 11.8 G/DL — LOW (ref 13–17)
MCHC RBC-ENTMCNC: 29.1 PG — SIGNIFICANT CHANGE UP (ref 27–34)
MCHC RBC-ENTMCNC: 33.7 GM/DL — SIGNIFICANT CHANGE UP (ref 32–36)
MCV RBC AUTO: 86.4 FL — SIGNIFICANT CHANGE UP (ref 80–100)
PLATELET # BLD AUTO: 174 K/UL — SIGNIFICANT CHANGE UP (ref 150–400)
POTASSIUM SERPL-MCNC: 3.5 MMOL/L — SIGNIFICANT CHANGE UP (ref 3.5–5.3)
POTASSIUM SERPL-SCNC: 3.5 MMOL/L — SIGNIFICANT CHANGE UP (ref 3.5–5.3)
RBC # BLD: 4.08 M/UL — LOW (ref 4.2–5.8)
RBC # FLD: 12.1 % — SIGNIFICANT CHANGE UP (ref 10.3–14.5)
SODIUM SERPL-SCNC: 140 MMOL/L — SIGNIFICANT CHANGE UP (ref 135–145)
WBC # BLD: 6.8 K/UL — SIGNIFICANT CHANGE UP (ref 3.8–10.5)
WBC # FLD AUTO: 6.8 K/UL — SIGNIFICANT CHANGE UP (ref 3.8–10.5)

## 2017-10-29 RX ORDER — OXYCODONE AND ACETAMINOPHEN 5; 325 MG/1; MG/1
2 TABLET ORAL EVERY 6 HOURS
Qty: 0 | Refills: 0 | Status: DISCONTINUED | OUTPATIENT
Start: 2017-10-29 | End: 2017-10-31

## 2017-10-29 RX ORDER — OXYCODONE AND ACETAMINOPHEN 5; 325 MG/1; MG/1
1 TABLET ORAL EVERY 6 HOURS
Qty: 0 | Refills: 0 | Status: DISCONTINUED | OUTPATIENT
Start: 2017-10-29 | End: 2017-10-31

## 2017-10-29 RX ORDER — HYDROMORPHONE HYDROCHLORIDE 2 MG/ML
1 INJECTION INTRAMUSCULAR; INTRAVENOUS; SUBCUTANEOUS
Qty: 0 | Refills: 0 | Status: DISCONTINUED | OUTPATIENT
Start: 2017-10-29 | End: 2017-10-31

## 2017-10-29 RX ADMIN — SODIUM CHLORIDE 150 MILLILITER(S): 9 INJECTION INTRAMUSCULAR; INTRAVENOUS; SUBCUTANEOUS at 02:54

## 2017-10-29 RX ADMIN — SODIUM CHLORIDE 150 MILLILITER(S): 9 INJECTION INTRAMUSCULAR; INTRAVENOUS; SUBCUTANEOUS at 09:39

## 2017-10-29 RX ADMIN — SODIUM CHLORIDE 50 MILLILITER(S): 9 INJECTION INTRAMUSCULAR; INTRAVENOUS; SUBCUTANEOUS at 11:29

## 2017-10-29 RX ADMIN — ENOXAPARIN SODIUM 40 MILLIGRAM(S): 100 INJECTION SUBCUTANEOUS at 11:30

## 2017-10-29 RX ADMIN — PANTOPRAZOLE SODIUM 40 MILLIGRAM(S): 20 TABLET, DELAYED RELEASE ORAL at 11:30

## 2017-10-29 RX ADMIN — ALVIMOPAN 12 MILLIGRAM(S): 12 CAPSULE ORAL at 17:28

## 2017-10-29 RX ADMIN — ALVIMOPAN 12 MILLIGRAM(S): 12 CAPSULE ORAL at 05:15

## 2017-10-29 NOTE — PROGRESS NOTE ADULT - ASSESSMENT
POD 3 s/p colostomy reversal and lysis of adhesions. Doing well    Clamp trial on NG  If tolerated, will remove and start clears  Wean fluids  Ambulate and IS  VTE prophylaxis

## 2017-10-29 NOTE — PROGRESS NOTE ADULT - SUBJECTIVE AND OBJECTIVE BOX
Had moderate bowel movement yesterday. Some blood reported in it. No nausea or emesis. Denies fevers or chills. Voiding without difficulty    Exam:  Vital Signs Last 24 Hrs  T(C): 36.7 (29 Oct 2017 06:08), Max: 37.2 (28 Oct 2017 21:16)  T(F): 98.1 (29 Oct 2017 06:08), Max: 98.9 (28 Oct 2017 21:16)  HR: 64 (29 Oct 2017 06:08) (64 - 85)  BP: 120/65 (29 Oct 2017 06:08) (109/69 - 120/65)  RR: 16 (29 Oct 2017 06:08) (16 - 16)  SpO2: 98% (29 Oct 2017 06:08) (98% - 100%)    General: alert, in no distress  Respiratory: non labored breathing  Abdomen: soft, non distended, incision clean and intact with staples. LUIS M with scant SS output  Neuro: alert and oriented x 3                          11.8   6.8   )-----------( 174      ( 29 Oct 2017 07:02 )             35.2   10-29    140  |  105  |  10  ----------------------------<  68<L>  3.5   |  27  |  0.70    Ca    8.1<L>      29 Oct 2017 07:02

## 2017-10-30 LAB
ANION GAP SERPL CALC-SCNC: 10 MMOL/L — SIGNIFICANT CHANGE UP (ref 5–17)
BUN SERPL-MCNC: 8 MG/DL — SIGNIFICANT CHANGE UP (ref 7–23)
CALCIUM SERPL-MCNC: 8.6 MG/DL — SIGNIFICANT CHANGE UP (ref 8.5–10.1)
CHLORIDE SERPL-SCNC: 106 MMOL/L — SIGNIFICANT CHANGE UP (ref 96–108)
CO2 SERPL-SCNC: 27 MMOL/L — SIGNIFICANT CHANGE UP (ref 22–31)
CREAT SERPL-MCNC: 0.7 MG/DL — SIGNIFICANT CHANGE UP (ref 0.5–1.3)
GLUCOSE SERPL-MCNC: 90 MG/DL — SIGNIFICANT CHANGE UP (ref 70–99)
HCT VFR BLD CALC: 35.2 % — LOW (ref 39–50)
HGB BLD-MCNC: 12 G/DL — LOW (ref 13–17)
MAGNESIUM SERPL-MCNC: 2.1 MG/DL — SIGNIFICANT CHANGE UP (ref 1.6–2.6)
MCHC RBC-ENTMCNC: 29 PG — SIGNIFICANT CHANGE UP (ref 27–34)
MCHC RBC-ENTMCNC: 34 GM/DL — SIGNIFICANT CHANGE UP (ref 32–36)
MCV RBC AUTO: 85.3 FL — SIGNIFICANT CHANGE UP (ref 80–100)
PHOSPHATE SERPL-MCNC: 2.1 MG/DL — LOW (ref 2.5–4.5)
PLATELET # BLD AUTO: 206 K/UL — SIGNIFICANT CHANGE UP (ref 150–400)
POTASSIUM SERPL-MCNC: 3.4 MMOL/L — LOW (ref 3.5–5.3)
POTASSIUM SERPL-SCNC: 3.4 MMOL/L — LOW (ref 3.5–5.3)
RBC # BLD: 4.13 M/UL — LOW (ref 4.2–5.8)
RBC # FLD: 11.9 % — SIGNIFICANT CHANGE UP (ref 10.3–14.5)
SODIUM SERPL-SCNC: 143 MMOL/L — SIGNIFICANT CHANGE UP (ref 135–145)
SURGICAL PATHOLOGY FINAL REPORT - CH: SIGNIFICANT CHANGE UP
WBC # BLD: 5.3 K/UL — SIGNIFICANT CHANGE UP (ref 3.8–10.5)
WBC # FLD AUTO: 5.3 K/UL — SIGNIFICANT CHANGE UP (ref 3.8–10.5)

## 2017-10-30 RX ORDER — SODIUM,POTASSIUM PHOSPHATES 278-250MG
1 POWDER IN PACKET (EA) ORAL
Qty: 0 | Refills: 0 | Status: COMPLETED | OUTPATIENT
Start: 2017-10-30 | End: 2017-10-31

## 2017-10-30 RX ORDER — POTASSIUM CHLORIDE 20 MEQ
20 PACKET (EA) ORAL
Qty: 0 | Refills: 0 | Status: COMPLETED | OUTPATIENT
Start: 2017-10-30 | End: 2017-10-30

## 2017-10-30 RX ORDER — POLYETHYLENE GLYCOL 3350 17 G/17G
17 POWDER, FOR SOLUTION ORAL AT BEDTIME
Qty: 0 | Refills: 0 | Status: DISCONTINUED | OUTPATIENT
Start: 2017-10-30 | End: 2017-10-31

## 2017-10-30 RX ORDER — KETOROLAC TROMETHAMINE 30 MG/ML
15 SYRINGE (ML) INJECTION EVERY 6 HOURS
Qty: 0 | Refills: 0 | Status: DISCONTINUED | OUTPATIENT
Start: 2017-10-30 | End: 2017-10-31

## 2017-10-30 RX ADMIN — ALVIMOPAN 12 MILLIGRAM(S): 12 CAPSULE ORAL at 05:29

## 2017-10-30 RX ADMIN — ENOXAPARIN SODIUM 40 MILLIGRAM(S): 100 INJECTION SUBCUTANEOUS at 11:40

## 2017-10-30 RX ADMIN — Medication 20 MILLIEQUIVALENT(S): at 11:42

## 2017-10-30 RX ADMIN — Medication 1 TABLET(S): at 22:13

## 2017-10-30 RX ADMIN — POLYETHYLENE GLYCOL 3350 17 GRAM(S): 17 POWDER, FOR SOLUTION ORAL at 22:13

## 2017-10-30 RX ADMIN — Medication 1 TABLET(S): at 17:48

## 2017-10-30 RX ADMIN — PANTOPRAZOLE SODIUM 40 MILLIGRAM(S): 20 TABLET, DELAYED RELEASE ORAL at 11:41

## 2017-10-30 RX ADMIN — Medication 1 TABLET(S): at 11:42

## 2017-10-30 RX ADMIN — Medication 20 MILLIEQUIVALENT(S): at 14:24

## 2017-10-30 RX ADMIN — ALVIMOPAN 12 MILLIGRAM(S): 12 CAPSULE ORAL at 17:48

## 2017-10-30 RX ADMIN — Medication 20 MILLIEQUIVALENT(S): at 19:35

## 2017-10-30 NOTE — PROGRESS NOTE ADULT - SUBJECTIVE AND OBJECTIVE BOX
No c/o. Clear liquids for dinner yest evening, no N/V, but felt full easily. Passing flatus and no significant BM, mostly blood. Pain controlled. OOB. Voiding freely.    MEDICATIONS  (STANDING):  alvimopan 12 milliGRAM(s) Oral two times a day  cefoTEtan  IVPB 2 Gram(s) IV Intermittent once  enoxaparin Injectable 40 milliGRAM(s) SubCutaneous daily  influenza   Vaccine 0.5 milliLiter(s) IntraMuscular once  pantoprazole  Injectable 40 milliGRAM(s) IV Push daily    MEDICATIONS  (PRN):  acetaminophen   Tablet 650 milliGRAM(s) Oral every 6 hours PRN For Temp greater than 38.5 C (101.3 F)  benzocaine 20% Spray 1 Spray(s) Topical every 3 hours PRN throat irritation  HYDROmorphone  Injectable 1 milliGRAM(s) IV Push every 3 hours PRN Severe Pain (7 - 10)  naloxone Injectable 0.1 milliGRAM(s) IV Push every 3 minutes PRN For ANY of the following changes in patient status:  A. RR LESS THAN 10 breaths per minute, B. Oxygen saturation LESS THAN 90%, C. Sedation score of 6  ondansetron Injectable 4 milliGRAM(s) IV Push every 6 hours PRN Nausea  ondansetron Injectable 4 milliGRAM(s) IV Push every 6 hours PRN Nausea and/or Vomiting  oxyCODONE    5 mG/acetaminophen 325 mG 1 Tablet(s) Oral every 6 hours PRN Mild Pain (1 - 3)  oxyCODONE    5 mG/acetaminophen 325 mG 2 Tablet(s) Oral every 6 hours PRN Moderate Pain (4 - 6)    Vital Signs Last 24 Hrs  T(C): 36.6 (30 Oct 2017 05:53), Max: 36.8 (29 Oct 2017 16:07)  T(F): 97.8 (30 Oct 2017 05:53), Max: 98.3 (29 Oct 2017 16:07)  HR: 62 (30 Oct 2017 05:53) (62 - 69)  BP: 123/62 (30 Oct 2017 05:53) (109/54 - 123/67)  BP(mean): --  RR: 16 (30 Oct 2017 05:53) (16 - 16)  SpO2: 100% (30 Oct 2017 05:53) (97% - 100%)  I&O's Detail    29 Oct 2017 07:01  -  30 Oct 2017 07:00  --------------------------------------------------------  IN:    sodium chloride 0.9%: 412 mL  Total IN: 412 mL    OUT:  Total OUT: 0 mL    Total NET: 412 mL    NAD  ABD exam :incisions CDI, soft, approp tender, mod distention                        12.0   5.3   )-----------( 206      ( 30 Oct 2017 07:19 )             35.2     10-29    140  |  105  |  10  ----------------------------<  68<L>  3.5   |  27  |  0.70    Ca    8.1<L>      29 Oct 2017 07:02    POD 4    Add toradol for pain control.  Encourage IS and OOB.  Advance to full liquid diet today.  Hgb stable at 12, on lovenox for DVT prop.  Afebrile, WBC normalized, off abx.  D/C planning.

## 2017-10-31 ENCOUNTER — TRANSCRIPTION ENCOUNTER (OUTPATIENT)
Age: 47
End: 2017-10-31

## 2017-10-31 VITALS
RESPIRATION RATE: 18 BRPM | SYSTOLIC BLOOD PRESSURE: 124 MMHG | DIASTOLIC BLOOD PRESSURE: 69 MMHG | HEART RATE: 61 BPM | TEMPERATURE: 98 F | OXYGEN SATURATION: 97 %

## 2017-10-31 LAB
ANION GAP SERPL CALC-SCNC: 9 MMOL/L — SIGNIFICANT CHANGE UP (ref 5–17)
BUN SERPL-MCNC: 8 MG/DL — SIGNIFICANT CHANGE UP (ref 7–23)
CALCIUM SERPL-MCNC: 9.2 MG/DL — SIGNIFICANT CHANGE UP (ref 8.5–10.1)
CHLORIDE SERPL-SCNC: 107 MMOL/L — SIGNIFICANT CHANGE UP (ref 96–108)
CO2 SERPL-SCNC: 28 MMOL/L — SIGNIFICANT CHANGE UP (ref 22–31)
CREAT SERPL-MCNC: 0.96 MG/DL — SIGNIFICANT CHANGE UP (ref 0.5–1.3)
GLUCOSE SERPL-MCNC: 102 MG/DL — HIGH (ref 70–99)
HCT VFR BLD CALC: 40.4 % — SIGNIFICANT CHANGE UP (ref 39–50)
HGB BLD-MCNC: 13.8 G/DL — SIGNIFICANT CHANGE UP (ref 13–17)
MCHC RBC-ENTMCNC: 29.2 PG — SIGNIFICANT CHANGE UP (ref 27–34)
MCHC RBC-ENTMCNC: 34.3 GM/DL — SIGNIFICANT CHANGE UP (ref 32–36)
MCV RBC AUTO: 85.1 FL — SIGNIFICANT CHANGE UP (ref 80–100)
PLATELET # BLD AUTO: 300 K/UL — SIGNIFICANT CHANGE UP (ref 150–400)
POTASSIUM SERPL-MCNC: 3.6 MMOL/L — SIGNIFICANT CHANGE UP (ref 3.5–5.3)
POTASSIUM SERPL-SCNC: 3.6 MMOL/L — SIGNIFICANT CHANGE UP (ref 3.5–5.3)
RBC # BLD: 4.75 M/UL — SIGNIFICANT CHANGE UP (ref 4.2–5.8)
RBC # FLD: 12 % — SIGNIFICANT CHANGE UP (ref 10.3–14.5)
SODIUM SERPL-SCNC: 144 MMOL/L — SIGNIFICANT CHANGE UP (ref 135–145)
WBC # BLD: 5.4 K/UL — SIGNIFICANT CHANGE UP (ref 3.8–10.5)
WBC # FLD AUTO: 5.4 K/UL — SIGNIFICANT CHANGE UP (ref 3.8–10.5)

## 2017-10-31 RX ORDER — POLYETHYLENE GLYCOL 3350 17 G/17G
17 POWDER, FOR SOLUTION ORAL
Qty: 17 | Refills: 0
Start: 2017-10-31

## 2017-10-31 RX ADMIN — ALVIMOPAN 12 MILLIGRAM(S): 12 CAPSULE ORAL at 06:23

## 2017-10-31 NOTE — DISCHARGE NOTE ADULT - NS AS ACTIVITY OBS
No Heavy lifting/straining/Bathing allowed/Showering allowed/Walking-Indoors allowed/Stairs allowed/Walking-Outdoors allowed

## 2017-10-31 NOTE — DISCHARGE NOTE ADULT - CARE PLAN
Principal Discharge DX:	Colostomy in place  Goal:	Recover  Instructions for follow-up, activity and diet:	see above

## 2017-10-31 NOTE — PROGRESS NOTE ADULT - SUBJECTIVE AND OBJECTIVE BOX
Feels well this morning and eager to leave today. Having solid bowel movements, scant blood in stool. No nausea, vomiting, fevers or chills.     Exam:  Vital Signs Last 24 Hrs  T(C): 37 (31 Oct 2017 06:36), Max: 37 (31 Oct 2017 06:36)  T(F): 98.6 (31 Oct 2017 06:36), Max: 98.6 (31 Oct 2017 06:36)  HR: 59 (31 Oct 2017 06:36) (59 - 71)  BP: 103/66 (31 Oct 2017 06:36) (103/66 - 122/77)  RR: 18 (31 Oct 2017 06:36) (16 - 18)  SpO2: 96% (31 Oct 2017 06:36) (96% - 98%)    General: alert, in no distress  Respiratory: non labored breathing  Abdomen: soft, non distended, incision clean and intact with staples. LUISM  drain removed  Neuro: alert and oriented x 3                          13.8   5.4   )-----------( 300      ( 31 Oct 2017 07:16 )             40.4   10-31    144  |  107  |  8   ----------------------------<  102<H>  3.6   |  28  |  0.96    Ca    9.2      31 Oct 2017 07:16  Phos  2.1     10-30  Mg     2.1     10-30

## 2017-10-31 NOTE — DISCHARGE NOTE ADULT - MEDICATION SUMMARY - MEDICATIONS TO TAKE
I will START or STAY ON the medications listed below when I get home from the hospital:    oxyCODONE-acetaminophen 5 mg-325 mg oral tablet  -- 1 tab(s) by mouth every 6 hours, As Needed -Mild Pain (1 - 3) - for moderate pain MDD:004   -- Indication: For postop pain    polyethylene glycol 3350 oral powder for reconstitution  -- 17 gram(s) by mouth once a day (at bedtime) MDD:17g  -- Indication: For to avoid constipation if on percocet

## 2017-10-31 NOTE — PROGRESS NOTE ADULT - ASSESSMENT
POD 5 s/p colostomy reversal and lysis of adhesions. Doing well    low fiber diet  Stool softener  Ambulate and IS  VTE prophylaxis  Discharge home today

## 2017-10-31 NOTE — DISCHARGE NOTE ADULT - CARE PROVIDER_API CALL
Annel Boss), ColonRectal Surgery; [place]  [address] [place] [address]  Phone: [phone]  Fax: [phone]

## 2017-10-31 NOTE — DISCHARGE NOTE ADULT - HOSPITAL COURSE
Mr. Cooper underwent an ex-lap, extensive CLIVE and colostomy reversal on 10/26/17. Because of the CLIVE, an NGT was left in place for 3 days. It was removed on POD 3 and patient was then started on clear liquids. On POD 4, he began passing flatus and was advanced to full liquid diet. On POD 5, he was passing solid bowel movements and was tolerating low fiber diet. His phoenix was discontinued on POD 2 after initial postop hematuria had resolved. His PCA was discontinued on POD 3 at the same time that his NPO status was discontinued. The pelvic drain was removed on the day of discharge. Throughout his hospital stay, his vital signs have been stable and his labwork has been unremarkable. As such, on POD 5. he was cleared for discharge.

## 2017-10-31 NOTE — DISCHARGE NOTE ADULT - PATIENT PORTAL LINK FT
“You can access the Follow[facility] Patient Portal, offered by [facility], by registering with the following website: [email or web address]”

## 2017-10-31 NOTE — DISCHARGE NOTE ADULT - INSTRUCTIONS
Adhere to a low fiber diet for at least 2 weeks or until seen in office.  No heavy lifting > 10lbs for one month  No driving while on percocet  Allow at least one month off from work OK to shower at home. Let soapy water run through incisions and colostomy site. Can cover colostomy site with clean cotton gauze if there is drainage. OK to shower at home. Let soapy water run through incisions and colostomy site. Can cover colostomy site with clean cotton gauze if there is drainage. Return to hospital if you develop redness increased pain at site chills or fever >101.

## 2017-11-04 DIAGNOSIS — Z43.3 ENCOUNTER FOR ATTENTION TO COLOSTOMY: ICD-10-CM

## 2017-11-04 DIAGNOSIS — R31.9 HEMATURIA, UNSPECIFIED: ICD-10-CM

## 2017-11-04 DIAGNOSIS — K66.0 PERITONEAL ADHESIONS (POSTPROCEDURAL) (POSTINFECTION): ICD-10-CM

## 2017-11-04 DIAGNOSIS — K76.0 FATTY (CHANGE OF) LIVER, NOT ELSEWHERE CLASSIFIED: ICD-10-CM

## 2017-11-09 ENCOUNTER — APPOINTMENT (OUTPATIENT)
Dept: COLORECTAL SURGERY | Facility: CLINIC | Age: 47
End: 2017-11-09
Payer: COMMERCIAL

## 2017-11-09 DIAGNOSIS — Z90.49 ACQUIRED ABSENCE OF OTHER SPECIFIED PARTS OF DIGESTIVE TRACT: ICD-10-CM

## 2017-11-09 DIAGNOSIS — Z09 ENCOUNTER FOR FOLLOW-UP EXAMINATION AFTER COMPLETED TREATMENT FOR CONDITIONS OTHER THAN MALIGNANT NEOPLASM: ICD-10-CM

## 2017-11-09 DIAGNOSIS — Z93.3 COLOSTOMY STATUS: ICD-10-CM

## 2017-11-09 PROCEDURE — 17250 CHEM CAUT OF GRANLTJ TISSUE: CPT | Mod: 79

## 2017-11-09 PROCEDURE — 99024 POSTOP FOLLOW-UP VISIT: CPT

## 2017-11-09 RX ORDER — TRANSPARENT DRESSING 4"X4 3/4"
BANDAGE TOPICAL
Qty: 2 | Refills: 3 | Status: COMPLETED | OUTPATIENT
Start: 2017-06-19 | End: 2017-11-09

## 2017-11-09 RX ORDER — AMOXICILLIN 500 MG/1
500 CAPSULE ORAL
Qty: 21 | Refills: 0 | Status: COMPLETED | COMMUNITY
Start: 2017-04-17 | End: 2017-11-09

## 2017-11-09 RX ORDER — METRONIDAZOLE 500 MG/1
500 TABLET ORAL
Qty: 6 | Refills: 0 | Status: COMPLETED | COMMUNITY
Start: 2017-09-25 | End: 2017-11-09

## 2017-11-09 RX ORDER — MAGNESIUM HYDROXIDE 1200 MG/15ML
0.9 LIQUID ORAL
Qty: 500 | Refills: 3 | Status: COMPLETED | OUTPATIENT
Start: 2017-06-19 | End: 2017-11-09

## 2017-11-09 RX ORDER — PROPYLENE GLYCOL/PEG 400 0.3 %-0.4%
DROPS OPHTHALMIC (EYE)
Qty: 2 | Refills: 6 | Status: COMPLETED | OUTPATIENT
Start: 2017-06-19 | End: 2017-11-09

## 2017-11-09 RX ORDER — GAUZE BANDAGE 4" X 4"
4"X4" BANDAGE TOPICAL
Qty: 1 | Refills: 3 | Status: COMPLETED | OUTPATIENT
Start: 2017-06-19 | End: 2017-11-09

## 2017-12-06 ENCOUNTER — APPOINTMENT (OUTPATIENT)
Dept: COLORECTAL SURGERY | Facility: CLINIC | Age: 47
End: 2017-12-06

## 2018-01-02 ENCOUNTER — APPOINTMENT (OUTPATIENT)
Dept: COLORECTAL SURGERY | Facility: CLINIC | Age: 48
End: 2018-01-02
Payer: COMMERCIAL

## 2018-01-02 VITALS
BODY MASS INDEX: 26.68 KG/M2 | DIASTOLIC BLOOD PRESSURE: 70 MMHG | TEMPERATURE: 97.8 F | SYSTOLIC BLOOD PRESSURE: 110 MMHG | WEIGHT: 197 LBS | HEIGHT: 72 IN | HEART RATE: 65 BPM

## 2018-01-02 PROCEDURE — 99024 POSTOP FOLLOW-UP VISIT: CPT

## 2018-04-09 NOTE — PROGRESS NOTE ADULT - ASSESSMENT
47M admitted for a 24hr h/o progressive abdominal pain that began after a 1-2 day trip to [facility]. CT with acute perforated sigmoid diverticulitis, s/p LAP with resection (Hartmanns/ileocolic resection) complicated by septic shock secondary to the above presently off pressor support. Rec'd large of INF for post fluid resuscitation now with anasarca      1)   Problem: s/p hartmans procedure for perforated diverticulitis as well as ileocolic resection with anastomosis and controlled anastomotic leak:  started on meropenem day 4 and diflucan day 3 as per ID   NPO, bowel rest,  TPN.  CCT-> perihepatic ascites/fluid collection; repeat CT next week  Has rec's 14 days of abx.   Diflucan for yeast in culture    2) Problem: Anasarca.  Plan: resolved  stop diuretics  Ambulate    3) Problem: HARDIK (acute kidney injury).  Plan: 2/2 to ATN from septic shock-> both resolved.     poc discussed with pt, team no

## 2019-03-20 PROBLEM — K57.90 DIVERTICULOSIS OF INTESTINE, PART UNSPECIFIED, WITHOUT PERFORATION OR ABSCESS WITHOUT BLEEDING: Chronic | Status: ACTIVE | Noted: 2017-10-19

## 2019-03-20 PROBLEM — Z22.39 CARRIER OF OTHER SPECIFIED BACTERIAL DISEASES: Chronic | Status: ACTIVE | Noted: 2017-10-19

## 2019-03-22 ENCOUNTER — APPOINTMENT (OUTPATIENT)
Dept: FAMILY MEDICINE | Facility: CLINIC | Age: 49
End: 2019-03-22
Payer: COMMERCIAL

## 2019-03-22 ENCOUNTER — APPOINTMENT (OUTPATIENT)
Dept: COLORECTAL SURGERY | Facility: CLINIC | Age: 49
End: 2019-03-22
Payer: COMMERCIAL

## 2019-03-22 ENCOUNTER — LABORATORY RESULT (OUTPATIENT)
Age: 49
End: 2019-03-22

## 2019-03-22 VITALS
WEIGHT: 218 LBS | BODY MASS INDEX: 29.53 KG/M2 | SYSTOLIC BLOOD PRESSURE: 96 MMHG | DIASTOLIC BLOOD PRESSURE: 66 MMHG | TEMPERATURE: 98.4 F | OXYGEN SATURATION: 98 % | HEIGHT: 72 IN | RESPIRATION RATE: 14 BRPM | HEART RATE: 61 BPM

## 2019-03-22 VITALS
HEIGHT: 72 IN | WEIGHT: 197 LBS | TEMPERATURE: 98.1 F | BODY MASS INDEX: 26.68 KG/M2 | HEART RATE: 66 BPM | DIASTOLIC BLOOD PRESSURE: 74 MMHG | SYSTOLIC BLOOD PRESSURE: 115 MMHG

## 2019-03-22 LAB — S PYO AG SPEC QL IA: NORMAL

## 2019-03-22 PROCEDURE — 99203 OFFICE O/P NEW LOW 30 MIN: CPT | Mod: 25

## 2019-03-22 PROCEDURE — 87880 STREP A ASSAY W/OPTIC: CPT | Mod: QW

## 2019-03-22 PROCEDURE — 99213 OFFICE O/P EST LOW 20 MIN: CPT

## 2019-03-22 NOTE — ASSESSMENT
[FreeTextEntry1] : Mr. Cooper presents to the office with concerns of LLQ abdominal discomfort. I will order a CT A/P to ensure absence of intra-abdominal pathology. I have asked him to see the PCPs at this office for additional evaluation of the rash as well as to establish care as he does not currently have an internist. Dr. Weinstein updated.

## 2019-03-22 NOTE — HISTORY OF PRESENT ILLNESS
[FreeTextEntry8] : 49 y.o. M with PMHx of perforated diverticulitis s/p emergent rutledge's procedure 2 years ago which was subsequently reversed presenting as new pt with diffuse erythematous rash, sent in by colorectal-Dr. Boss who saw pt earlier today. Pt reports he woke up with the rash, was fine when he went to bed last night. Rash affects trunk, back, arms, and legs. It is not pruritic or painful. Pt does endorse some body aches especially in arms. No new medications including antibiotics in the past few weeks. No new foods. No recent travel. A few days ago had one or two days of chills, feeling run down but no runny nose, sore throat, cough. Has 2 children at home but they have not been sick. Pt being evaluated for LLQ pain by Dr. Boss, will have CTAP. \par \par

## 2019-03-22 NOTE — PHYSICAL EXAM

## 2019-03-22 NOTE — PLAN
[FreeTextEntry1] : -likely viral exanthem\par -check labs\par -supportive care including zyrtec during day, benadryl at night \par -reassured pt that rash will likely start to improve over next few days\par -rto or call if worsening or no improvement by next week

## 2019-03-22 NOTE — HISTORY OF PRESENT ILLNESS
[FreeTextEntry1] : Mr. Cooper presents to the office with reports of abdominal discomfort in LLQ since yesterday as well as a diffuse erythematous rash along his B/L UE and trunk. He denies any F/C and reports no unusual or new medications. BMs have been per usual, fairly frequent.

## 2019-03-22 NOTE — PHYSICAL EXAM
[No Rash or Lesion] : No rash or lesion [Alert] : alert [Oriented to Person] : oriented to person [Oriented to Place] : oriented to place [Oriented to Time] : oriented to time [Calm] : calm [de-identified] : incisions CDI, soft, NTND [de-identified] : NAD [de-identified] : NCAT [de-identified] : MIRANDA x 4

## 2019-03-25 DIAGNOSIS — Z00.00 ENCOUNTER FOR GENERAL ADULT MEDICAL EXAMINATION W/OUT ABNORMAL FINDINGS: ICD-10-CM

## 2019-03-25 LAB
25(OH)D3 SERPL-MCNC: 19.2 NG/ML
ALBUMIN SERPL ELPH-MCNC: 4.2 G/DL
ALP BLD-CCNC: 74 U/L
ALT SERPL-CCNC: 96 U/L
ANION GAP SERPL CALC-SCNC: 11 MMOL/L
AST SERPL-CCNC: 57 U/L
BASOPHILS # BLD AUTO: 0.03 K/UL
BASOPHILS NFR BLD AUTO: 0.6 %
BILIRUB SERPL-MCNC: 0.5 MG/DL
BUN SERPL-MCNC: 14 MG/DL
CALCIUM SERPL-MCNC: 9.1 MG/DL
CHLORIDE SERPL-SCNC: 104 MMOL/L
CHOLEST SERPL-MCNC: 93 MG/DL
CHOLEST/HDLC SERPL: 2.4 RATIO
CK SERPL-CCNC: 58 U/L
CO2 SERPL-SCNC: 26 MMOL/L
CREAT SERPL-MCNC: 1.11 MG/DL
EOSINOPHIL # BLD AUTO: 0.14 K/UL
EOSINOPHIL NFR BLD AUTO: 2.9 %
ESTIMATED AVERAGE GLUCOSE: 111 MG/DL
FOLATE SERPL-MCNC: >20 NG/ML
GLUCOSE SERPL-MCNC: 83 MG/DL
HBA1C MFR BLD HPLC: 5.5 %
HCT VFR BLD CALC: 45.9 %
HDLC SERPL-MCNC: 39 MG/DL
HGB BLD-MCNC: 15.3 G/DL
IMM GRANULOCYTES NFR BLD AUTO: 0.2 %
LDLC SERPL CALC-MCNC: 38 MG/DL
LYMPHOCYTES # BLD AUTO: 1.57 K/UL
LYMPHOCYTES NFR BLD AUTO: 32.4 %
MAN DIFF?: NORMAL
MCHC RBC-ENTMCNC: 29.4 PG
MCHC RBC-ENTMCNC: 33.3 GM/DL
MCV RBC AUTO: 88.3 FL
MONOCYTES # BLD AUTO: 0.27 K/UL
MONOCYTES NFR BLD AUTO: 5.6 %
NEUTROPHILS # BLD AUTO: 2.83 K/UL
NEUTROPHILS NFR BLD AUTO: 58.3 %
PLATELET # BLD AUTO: 144 K/UL
POTASSIUM SERPL-SCNC: 4.4 MMOL/L
PROT SERPL-MCNC: 6.6 G/DL
RBC # BLD: 5.2 M/UL
RBC # FLD: 11.9 %
RUBV IGM FLD-ACNC: 27.2 AU/ML
SODIUM SERPL-SCNC: 141 MMOL/L
TRIGL SERPL-MCNC: 81 MG/DL
TSH SERPL-ACNC: 0.89 UIU/ML
VIT B12 SERPL-MCNC: 329 PG/ML
WBC # FLD AUTO: 4.85 K/UL

## 2019-03-28 LAB
B19V IGG SER QL IA: 4.5 INDEX
B19V IGG+IGM SER-IMP: NORMAL
B19V IGG+IGM SER-IMP: POSITIVE
B19V IGM FLD-ACNC: >12
B19V IGM SER-ACNC: POSITIVE

## 2019-04-24 NOTE — CONSULT NOTE ADULT - ASSESSMENT
47M admitted for a 24hr h/o progressive abdominal pain that began after a 1-2 day trip to [facility] Pt was found with diverticulitis of intestines with perforation, requiring surgical intervention. Psych consulted per wife's request.     Impression  Adjustment reaction, anxious. Normal in this circumstances.   No acute psychiatric sx present.   No psychiatric intervention required. Initial (On Arrival)

## 2020-05-28 NOTE — PATIENT PROFILE ADULT. - HAS THE PATIENT HAD A SIGNIFICANT CHANGE IN FUNCTIONAL STATUS DUE TO CVA, HEAD TRAUMA, ORTHOPEDIC TRAUMA/SURGERY, OR FALL, WITH THE WEEK PRIOR TO ADMISSION
Referred by: Srinivasa Hernandez MD; Medical Diagnosis (from order):    Diagnosis Information      Diagnosis    V45.89 (ICD-9-CM) - Z98.890 (ICD-10-CM) - Status post rotator cuff repair                Occupational Therapy -  Progress Note    Visit:  4   Next referring provider appointment: 6/1/2020  Occupation: /owner    Employer:  [employer]  [address] [place] [id number]  : Adjustor: Joann Alcantara [phone], NCM: Iwona FLORES [phone]; Phone Number:  Fax for Iwona: [phone]  Restrictions: off work  Current Work Status: off work due to current condition  Full Duty Work Demands: heavy (more than 50 lbs);   /: Iwona FLORES [phone];   Communication details/results:  5/12/2020:  Provide SAMEER Bustillo, status update and faxed over evaluation  5/28/2020: provided Iwona with status update for MD follow up on 6/1/2020, faxed note to her         Diagnosis Precautions: MD guidelines    SUBJECTIVE                                                                                                             Date of onset: 10/29/2018  Date of Surgery: 2/25/2020; Surgery: Repair, Left  Rotator Cuff, Arthroscopic - Left  This is second surgery on left shoulder    Pain: 2-3/10  \"pretty low\" \"I have noticed a tender spot\"  \"but I have been doing my exercises more\"    13 weeks post on 5/26/2020   Functional Change: Increased ease reaching with both hand to hang a towel on a rack  \"the motion doesn't hurt at all like last time\"  Current functional limitations: \"it fatigues easily, like almost instantly, but, the motion is there\"  Weakness with overhead reach/lift needed to perform job demands   Pain / Symptoms:  Pain/symptom is: intermittent  Pain rating (out of 10): Current: 2 ; Best: 0; Worst: 4  Location: Left shoulder/AC region   Quality / Description: ache, sore, tight.  Alleviating Factors: rest, ice.   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  2 hand carry patient ability: 15#  2 hand carry job demands: 60#  Sustained overhead work patient ability: rare  Sustained overhead work job demands: frequently (1/3-2/3 of the day)    Hand Dominance: right-handed;   Range of Motion (ROM)   (norms in parentheses, degrees unless noted, active unless noted):   Shoulder:     - Flexion (180):        • Left: 160     - Abduction (180):        • Left: 150     - Behind Back Internal Rotation:        • Left: L5    - Behind the Head External Rotation:        • Left: C5    Strength  (out of 5 unless noted, standard test position unless noted, lbs tested with hand held dynamometer)   Shoulder:     - Flexion:         • Left: 4-     - Abduction:        • Left: 4    - Internal Rotation:          • Left (at 0°): 3    - External Rotation:         • Left (at 0°): 4+       TREATMENT                                                                                                                  Therapeutic Exercise:  Reassessed AROM and strength for MD follow up  Shoulder pulleys abduction with eccentric lowering x 8  Standing shoulder flexion to 90 degrees with 1# dumbbell x 10 x 2. Then abduction x 8 before fatigue and breakdown in body mechanics  Towel stretch for IR standing x 4  4 quadrant stretch x 3  1# ball on wall scapular clocks 12-3-6-9 x 10  *begin IR/ER with orange T-band for HEP (has T-band for rowing)    Therapeutic Activity:  Floor to waist lift: 10# x 3, 15# x 3, 20# x 3, 25# x 1, 20# x 4  2 handed carry 15 ft. 10# x 1, 15# x 2  Waist to chest lift: 5# x 2  Reaching to 72\" shelf with left x 5    Skilled input:  verbal instruction/cues, posture correction and as detailed above    Writer verbally educated and received verbal consent for hand placement, positioning of patient, and techniques to be performed today from patient for hand placement and palpation for techniques as described above and how they are pertinent to the patient's plan of care.    Home Exercise Program: (*above indicates provided as part of home exercise program)  Access Code: [email or web address]             URL: [email or web address]       Date: 05/11/2020   Prepared by: Narcisa Han    Exercises  Standing shoulder flexion wall slides - 10 reps - 2 sets - 3x daily - 7x weekly  [place]: shoulder table slides abduction - 10 reps - 2 sets - 3x daily - 7x weekly  Cancer Rehab-Shoulder Abduction with Lower Trunk Rotation - 10 reps - 2 sets - 3x daily - 7x weekly  Sleeper Stretch - 10 reps - 2 sets - 3x daily - 7x weekly  Isometric Shoulder External Rotation at Wall - 10 reps - 2 sets - 3x daily - 7x weekly    Access Code: [email or web address]   URL: [email or web address]   Date: 05/15/2020   Prepared by: Narcisa Han   Exercises  Standing Row with Resistance - 10 reps - 2 sets - 3x daily - 7x weekly  Isometric Shoulder External Rotation at Wall - 10 reps - 2 sets - 3x daily - 7x weekly  Standing Isometric Shoulder Internal Rotation at Doorway - 10 reps - 2 sets - 3x daily - 7x weekly  Prone Shoulder Extension - 10 reps - 2 sets - 3x daily - 7x weekly  Prone Shoulder Row - 10 reps - 2 sets - 3x daily - 7x weekly  Seated upper trapezius stretch    Access Code: [email or web address]   URL: [email or web address]   Date: 05/21/2020   Prepared by: Narcisa Han   Exercises  Supine Scapular Protraction in Flexion with Dumbbells - 10 reps - 2 sets - 3x daily - 7x weekly  Sidelying Shoulder ER with Towel and Dumbbell - 10 reps - 2 sets 3x daily - 7x weekly  Standing Shoulder Flexion to 90 Degrees with Dumbbells - 10 reps - 2 sets - 3x daily - 7x weekly  Single Arm  Bent Over Shoulder Extension with Dumbbell - 10 reps - 2 sets - 3x daily - 7x weekly  Supine Chest Stretch with Elbows Bent - 10 Reps - 3 Sets - 1x daily - 7x weekly  Sleeper Stretch - 10 Reps - 3 Sets - 1x daily - 7x weekly      ASSESSMENT                                                                                                             AROM WFL, however, fatigue with repetitive reaching patterns, weakness noted  Tolerated first trial of job simulation well (all below chest level, however)  Improved tolerance to strengthening  Discussed progression to Workforce Health Program beginning next week after MD follow up.  Patient is agreeable to plan    Pain after session:  2-3 \"this one spot\"    Pain/symptoms after session: 3  Patient Education:   Results of above outlined education: Verbalizes understanding, Demonstrates understanding and Needs reinforcement     PLAN                                                                                                                             Suggestions for next session as indicated: Progress per plan of care    GOALS                                                                                                                       Long Term Goals: To be met by end of plan of care:      Home Exercise Program: Independent with progressed and modified home exercise program (HEP)      Pain: Decrease pain/symptoms to 1 to ease RTW regular duty  Strength: Improve involved strength to  5, to ease painting and RTW regular duty  Range of Motion: Improve involved range of motion (ROM) to   WNL left 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  Lift  Overhead: Lift  50 pounds overhead  with proper body mechanics to assist with lifting overhead activities at work.  Carry: Carry 60 pounds 20 feet to allow them to to ease carrying paint buckets/scaffolds at work.      Procedures and total treatment time documented Time Entry flowsheet.     no

## 2021-01-26 NOTE — PROGRESS NOTE ADULT - SUBJECTIVE AND OBJECTIVE BOX
Neck , no lymphadenopathy No acute overnight issues. Diuresing well. Off abx.    MEDICATIONS  (STANDING):  pantoprazole  Injectable 40milliGRAM(s) IV Push daily  heparin  Injectable 5000Unit(s) SubCutaneous every 8 hours  ALBUTerol/ipratropium for Nebulization 3milliLiter(s) Nebulizer every 6 hours  furosemide   Injectable 40milliGRAM(s) IV Push every 12 hours    MEDICATIONS  (PRN):  naloxone Injectable 0.1milliGRAM(s) IV Push every 3 minutes PRN For ANY of the following changes in patient status:  A. RR LESS THAN 10 breaths per minute, B. Oxygen saturation LESS THAN 90%, C. Sedation score of 6  ondansetron Injectable 4milliGRAM(s) IV Push every 6 hours PRN Nausea  ondansetron Injectable 4milliGRAM(s) IV Push every 6 hours PRN Nausea  acetaminophen  Suppository 650milliGRAM(s) Rectal every 6 hours PRN For Temp greater than 38.5 C (101.3 F)  zolpidem 5milliGRAM(s) Oral at bedtime PRN Insomnia  melatonin Oral Tab/Cap - Peds 3milliGRAM(s) Oral at bedtime PRN Insomnia    Vital Signs Last 24 Hrs  T(C): 37, Max: 37.8 (06-01 @ 14:39)  T(F): 98.6, Max: 100.1 (06-01 @ 14:39)  HR: 78 (68 - 86)  BP: 119/54 (111/54 - 126/64)  BP(mean): 70 (65 - 99)  RR: 21 (14 - 21)  SpO2: 94% (94% - 100%)  I&O's Detail    I & Os for current day (as of 02 Jun 2017 07:26)  =============================================  IN:    IV PiggyBack: 815 ml    Total IN: 815 ml  ---------------------------------------------  OUT:    Incontinent per Retracted Penis Pouch: 5700 ml    Colostomy: 575 ml    Bulb: 175 ml serosanguineous    T-Tube: 15 ml serous    Total OUT: 6465 ml  ---------------------------------------------  Total NET: -5650 ml    ABD exam :                        10.0   15.0  )-----------( 375      ( 02 Jun 2017 05:53 )             29.0     06-02    138  |  100  |  8   ----------------------------<  97  3.5   |  32<H>  |  0.88    Ca    7.7<L>      02 Jun 2017 05:53  Phos  2.4     06-02  Mg     2.1     06-02    POD 12  HD stable, no issues.  Encourage IS and OOB.  Advance to regular diet. Will d/c RLQ drain on Monday and request IR tube study as well for Monday.  Excellent UOP, tolerating diuresis without increase in Bun/Cr.  Stable Hgb, on hep SQ for DVT prop.  Afebrile, WBC stable at 15, off abx as of yesterday, fluid from IR perc drain NGTD.  Replete SEBASTIÁN

## 2022-02-23 ENCOUNTER — EMERGENCY (EMERGENCY)
Facility: HOSPITAL | Age: 52
LOS: 0 days | Discharge: HOME | End: 2022-02-23
Attending: EMERGENCY MEDICINE
Payer: SELF-PAY

## 2022-02-23 VITALS
SYSTOLIC BLOOD PRESSURE: 118 MMHG | HEART RATE: 70 BPM | RESPIRATION RATE: 15 BRPM | DIASTOLIC BLOOD PRESSURE: 80 MMHG | TEMPERATURE: 98 F | OXYGEN SATURATION: 100 %

## 2022-02-23 VITALS — WEIGHT: 225.09 LBS | HEIGHT: 72 IN

## 2022-02-23 DIAGNOSIS — Z41.9 ENCOUNTER FOR PROCEDURE FOR PURPOSES OTHER THAN REMEDYING HEALTH STATE, UNSPECIFIED: Chronic | ICD-10-CM

## 2022-02-23 DIAGNOSIS — R10.31 RIGHT LOWER QUADRANT PAIN: ICD-10-CM

## 2022-02-23 DIAGNOSIS — K43.9 VENTRAL HERNIA WITHOUT OBSTRUCTION OR GANGRENE: ICD-10-CM

## 2022-02-23 LAB
ALBUMIN SERPL ELPH-MCNC: 4 G/DL — SIGNIFICANT CHANGE UP (ref 3.3–5)
ALP SERPL-CCNC: 72 U/L — SIGNIFICANT CHANGE UP (ref 40–120)
ALT FLD-CCNC: 132 U/L — HIGH (ref 12–78)
ANION GAP SERPL CALC-SCNC: 7 MMOL/L — SIGNIFICANT CHANGE UP (ref 5–17)
APTT BLD: 25 SEC — LOW (ref 27.5–35.5)
AST SERPL-CCNC: 86 U/L — HIGH (ref 15–37)
BASOPHILS # BLD AUTO: 0.07 K/UL — SIGNIFICANT CHANGE UP (ref 0–0.2)
BASOPHILS NFR BLD AUTO: 0.5 % — SIGNIFICANT CHANGE UP (ref 0–2)
BILIRUB SERPL-MCNC: 1.2 MG/DL — SIGNIFICANT CHANGE UP (ref 0.2–1.2)
BUN SERPL-MCNC: 18 MG/DL — SIGNIFICANT CHANGE UP (ref 7–23)
CALCIUM SERPL-MCNC: 9.4 MG/DL — SIGNIFICANT CHANGE UP (ref 8.5–10.1)
CHLORIDE SERPL-SCNC: 106 MMOL/L — SIGNIFICANT CHANGE UP (ref 96–108)
CO2 SERPL-SCNC: 25 MMOL/L — SIGNIFICANT CHANGE UP (ref 22–31)
CREAT SERPL-MCNC: 1.33 MG/DL — HIGH (ref 0.5–1.3)
EOSINOPHIL # BLD AUTO: 0.06 K/UL — SIGNIFICANT CHANGE UP (ref 0–0.5)
EOSINOPHIL NFR BLD AUTO: 0.4 % — SIGNIFICANT CHANGE UP (ref 0–6)
GLUCOSE SERPL-MCNC: 74 MG/DL — SIGNIFICANT CHANGE UP (ref 70–99)
HCT VFR BLD CALC: 55.7 % — HIGH (ref 39–50)
HGB BLD-MCNC: 18.8 G/DL — HIGH (ref 13–17)
IMM GRANULOCYTES NFR BLD AUTO: 0.4 % — SIGNIFICANT CHANGE UP (ref 0–1.5)
INR BLD: 0.94 RATIO — SIGNIFICANT CHANGE UP (ref 0.88–1.16)
LYMPHOCYTES # BLD AUTO: 1.65 K/UL — SIGNIFICANT CHANGE UP (ref 1–3.3)
LYMPHOCYTES # BLD AUTO: 10.8 % — LOW (ref 13–44)
MCHC RBC-ENTMCNC: 30.1 PG — SIGNIFICANT CHANGE UP (ref 27–34)
MCHC RBC-ENTMCNC: 33.8 GM/DL — SIGNIFICANT CHANGE UP (ref 32–36)
MCV RBC AUTO: 89.3 FL — SIGNIFICANT CHANGE UP (ref 80–100)
MONOCYTES # BLD AUTO: 1.12 K/UL — HIGH (ref 0–0.9)
MONOCYTES NFR BLD AUTO: 7.3 % — SIGNIFICANT CHANGE UP (ref 2–14)
NEUTROPHILS # BLD AUTO: 12.31 K/UL — HIGH (ref 1.8–7.4)
NEUTROPHILS NFR BLD AUTO: 80.6 % — HIGH (ref 43–77)
PLATELET # BLD AUTO: 240 K/UL — SIGNIFICANT CHANGE UP (ref 150–400)
POTASSIUM SERPL-MCNC: 4.8 MMOL/L — SIGNIFICANT CHANGE UP (ref 3.5–5.3)
POTASSIUM SERPL-SCNC: 4.8 MMOL/L — SIGNIFICANT CHANGE UP (ref 3.5–5.3)
PROT SERPL-MCNC: 8.3 GM/DL — SIGNIFICANT CHANGE UP (ref 6–8.3)
PROTHROM AB SERPL-ACNC: 10.9 SEC — SIGNIFICANT CHANGE UP (ref 10.5–13.4)
RBC # BLD: 6.24 M/UL — HIGH (ref 4.2–5.8)
RBC # FLD: 12.5 % — SIGNIFICANT CHANGE UP (ref 10.3–14.5)
SODIUM SERPL-SCNC: 138 MMOL/L — SIGNIFICANT CHANGE UP (ref 135–145)
WBC # BLD: 15.27 K/UL — HIGH (ref 3.8–10.5)
WBC # FLD AUTO: 15.27 K/UL — HIGH (ref 3.8–10.5)

## 2022-02-23 PROCEDURE — 85610 PROTHROMBIN TIME: CPT

## 2022-02-23 PROCEDURE — 36415 COLL VENOUS BLD VENIPUNCTURE: CPT

## 2022-02-23 PROCEDURE — 86900 BLOOD TYPING SEROLOGIC ABO: CPT

## 2022-02-23 PROCEDURE — 74177 CT ABD & PELVIS W/CONTRAST: CPT | Mod: MA

## 2022-02-23 PROCEDURE — 86850 RBC ANTIBODY SCREEN: CPT

## 2022-02-23 PROCEDURE — 99284 EMERGENCY DEPT VISIT MOD MDM: CPT | Mod: 25

## 2022-02-23 PROCEDURE — 80053 COMPREHEN METABOLIC PANEL: CPT

## 2022-02-23 PROCEDURE — 86901 BLOOD TYPING SEROLOGIC RH(D): CPT

## 2022-02-23 PROCEDURE — 99285 EMERGENCY DEPT VISIT HI MDM: CPT

## 2022-02-23 PROCEDURE — 85730 THROMBOPLASTIN TIME PARTIAL: CPT

## 2022-02-23 PROCEDURE — 85025 COMPLETE CBC W/AUTO DIFF WBC: CPT

## 2022-02-23 PROCEDURE — 74177 CT ABD & PELVIS W/CONTRAST: CPT | Mod: 26,MA

## 2022-02-23 RX ORDER — SODIUM CHLORIDE 9 MG/ML
1000 INJECTION INTRAMUSCULAR; INTRAVENOUS; SUBCUTANEOUS ONCE
Refills: 0 | Status: COMPLETED | OUTPATIENT
Start: 2022-02-23 | End: 2022-02-23

## 2022-02-23 RX ADMIN — SODIUM CHLORIDE 1000 MILLILITER(S): 9 INJECTION INTRAMUSCULAR; INTRAVENOUS; SUBCUTANEOUS at 14:42

## 2022-02-23 NOTE — ED STATDOCS - CLINICAL SUMMARY MEDICAL DECISION MAKING FREE TEXT BOX
Ct demonstrating multiple ventral hernia containing small and large bowel.  Trace mesenteric fluid.  Neg. SBO, abscess, incarcerated hernia.  Mild leukocytosis and hemoconcentration.  Will DC with strict return precautions and surgical follow up for the hernias.  Belkys Etienne PA-C Ct demonstrating multiple ventral hernia containing small and large bowel.  Trace mesenteric fluid.  Neg. SBO, abscess, incarcerated hernia.  Mild leukocytosis and hemoconcentration.  Will DC with strict return precautions and surgical follow up for the hernias.  Belkys Etienne PA-C    Agree with above.  Patient with reducible hernia on exam, CT consistent with this.  Labs with mild HARDIK, hemoconcentration on labs reflecting a mild dehydration; hydrated in ED.  D/c home with surgery follow up. Phil Reich D.O.

## 2022-02-23 NOTE — ED STATDOCS - NSICDXPASTMEDICALHX_GEN_ALL_CORE_FT
PAST MEDICAL HISTORY:  Colostomy in place     Diverticular disease surgery 5/2017    Fatty liver     VRE (vancomycin resistant enterococcus) culture positive History of

## 2022-02-23 NOTE — ED ADULT NURSE NOTE - PAIN RATING/NUMBER SCALE (0-10): REST
6 Fluconazole Counseling:  Patient counseled regarding adverse effects of fluconazole including but not limited to headache, diarrhea, nausea, upset stomach, liver function test abnormalities, taste disturbance, and stomach pain.  There is a rare possibility of liver failure that can occur when taking fluconazole.  The patient understands that monitoring of LFTs and kidney function test may be required, especially at baseline. The patient verbalized understanding of the proper use and possible adverse effects of fluconazole.  All of the patient's questions and concerns were addressed.

## 2022-02-23 NOTE — ED STATDOCS - NSFOLLOWUPINSTRUCTIONS_ED_ALL_ED_FT
Ventral Hernia    WHAT YOU NEED TO KNOW:    A ventral hernia is a bulging of organs or abdominal tissue through a weak spot or opening in the abdominal wall. The abdominal wall is made up of fat and muscle. It holds the organs in place. The types of ventral hernias are epigastric, umbilical, spigelian, and incisional.     DISCHARGE INSTRUCTIONS:    Seek care immediately if:   •Your symptoms, such as pain or vomiting, get worse.       •Your abdomen is larger than usual.       •Your hernia increases in size or is purple or blue.       Contact your healthcare provider if:   •You have a fever.      •You have questions or concerns about your condition or care.      Medicines:   •Pain medicine may be given. Ask your healthcare provider how to take this safely.       •Take your medicine as directed. Contact your healthcare provider if you think your medicine is not helping or if you have side effects. Tell him or her if you are allergic to any medicine. Keep a list of the medicines, vitamins, and herbs you take. Include the amounts, and when and why you take them. Bring the list or the pill bottles to follow-up visits. Carry your medicine list with you in case of an emergency.      Self-care:   •Do not lift anything heavy. Heavy lifting can make your hernia worse or cause another hernia. Ask your healthcare provider how much is safe for you to lift.       •Drink liquids as directed. Liquids may prevent constipation and straining during a bowel movement. Ask how much liquid to drink each day and which liquids are best for you.       •Eat foods high in fiber. Fiber may prevent constipation and straining during a bowel movement. Foods that contain fiber include fruits, vegetables, legumes, and whole grains.       •Maintain a healthy weight. Weight loss may prevent your hernia from getting worse. It may also prevent another hernia. Talk to your healthcare provider about exercise and how to lose weight.       •Wear an abdominal binder as directed. An abdominal binder well help prevent the hernia from happening again. It will hold it in the correct place after your healthcare provider reduces the hernia. Ask your healthcare provider if you can take the binder off for sleep. Apply the binder first thing in the morning, before you get out of bed. Do not wear your binder over your clothes. Apply it to your bare skin. Gently wash your skin under the binder daily. Pat your skin dry. Ask your healthcare provider what you should use to keep the area dry, such as cornstarch.      Follow up with your healthcare provider as directed: You may need to see a surgeon to plan for surgery to fix your hernia. Write down your questions so you remember to ask them during your visits.

## 2022-02-23 NOTE — ED STATDOCS - PROGRESS NOTE DETAILS
Pt. is a 51 year old male Hx diverticulitis, colon resection. presents with abdominal pain since last night.  Pt. with decreased BM and states he saw a "bulge" to his abdomen.  Neg. N/V/D, fever, vomiting.   Surgeon:  Dr. Boss.  Belkys Etienne PA-C Ventral hernia reduced by attending in intake.  Will obtain labs, CT.  Belkys Etienne PA-C Ct demonstrating multiple ventral hernia containing small and large bowel.  Trace mesenteric fluid.  Neg. SBO, abscess, incarcerated hernia.  Mild leukocytosis and hemoconcentration.  Will DC with strict return precautions and surgical follow up for the hernias.  Belkys Etienne PA-C

## 2022-02-23 NOTE — ED STATDOCS - NORMAL, MLM
Alert-The patient is alert, awake and responds to voice. The patient is oriented to time, place, and person. The triage nurse is able to obtain subjective information. jenny all pertinent systems normal

## 2022-02-23 NOTE — ED STATDOCS - PROVIDER TOKENS
PROVIDER:[TOKEN:[[id number]]] PROVIDER:[TOKEN:[[id number]]],PROVIDER:[TOKEN:[[id number]]]

## 2022-02-23 NOTE — ED STATDOCS - CARE PROVIDERS DIRECT ADDRESSES
,[email or web address],DirectAddress_Unknown

## 2022-02-23 NOTE — ED ADULT NURSE NOTE - NSIMPLEMENTINTERV_GEN_ALL_ED
Implemented All Universal Safety Interventions:  [place] to call system. Call bell, personal items and telephone within reach. Instruct patient to call for assistance. Room bathroom lighting operational. Non-slip footwear when patient is off stretcher. Physically safe environment: no spills, clutter or unnecessary equipment. Stretcher in lowest position, wheels locked, appropriate side rails in place.

## 2022-02-23 NOTE — ED STATDOCS - OBJECTIVE STATEMENT
50 y/o male with PMHx of diverticulitis s/p colon resection, fatty liver presents to the ED c/o right lower abdominal pain which started at approximately 8 PM last night. Pt states he has had decreased bowel movements. Also endorses he has noticed a bulge to his abdomen. Denies fevers, nausea, vomiting, diarrhea. Surgeon: Dr. Boss. No other complaints at this time.

## 2022-02-23 NOTE — ED STATDOCS - PATIENT PORTAL LINK FT
You can access the FollowMyHealth Patient Portal offered by [facility] by registering at the following website: [email or web address]. By joining [employer]’s FollowMyHealth portal, you will also be able to view your health information using other applications (apps) compatible with our system.

## 2022-02-23 NOTE — ED STATDOCS - CARE PROVIDER_API CALL
Annel Boss)  ColonRectal Surgery; Surgery  [address] [place] [address]  Phone: [phone]  Fax: [phone]  Follow Up Time:    Facundo Cardona ()  Surgery; Surgical Critical Care  [address], [address]st [place] [address]  Phone: [phone]  Fax: [phone]  Follow Up Time:     Markus Alejandre (MD)  Surgery; Surgical Critical Care  [address], [address]  [place] [address]  Phone: [phone]  Fax: [phone]  Follow Up Time:

## 2022-02-23 NOTE — ED ADULT TRIAGE NOTE - CHIEF COMPLAINT QUOTE
pt c/o lower abdominal pain beginning last night around 8p. denies n/v/d. +chills. denies fever. hx- colon resection

## 2022-03-14 ENCOUNTER — APPOINTMENT (OUTPATIENT)
Age: 52
End: 2022-03-14
Payer: COMMERCIAL

## 2022-03-14 VITALS
SYSTOLIC BLOOD PRESSURE: 127 MMHG | BODY MASS INDEX: 29.8 KG/M2 | HEART RATE: 61 BPM | TEMPERATURE: 96.4 F | OXYGEN SATURATION: 100 % | DIASTOLIC BLOOD PRESSURE: 83 MMHG | RESPIRATION RATE: 14 BRPM | HEIGHT: 72 IN | WEIGHT: 220 LBS

## 2022-03-14 PROCEDURE — 99214 OFFICE O/P EST MOD 30 MIN: CPT

## 2022-03-14 NOTE — PHYSICAL EXAM
[No Rash or Lesion] : No rash or lesion [Alert] : alert [Oriented to Person] : oriented to person [Oriented to Place] : oriented to place [Oriented to Time] : oriented to time [Calm] : calm [de-identified] : incisions CDI, soft, NTND [de-identified] : NAD [de-identified] : NCAT [de-identified] : MIRANDA x 4

## 2022-03-14 NOTE — ASSESSMENT
[FreeTextEntry1] : Mr. Cooper presents to the office with concerns of LLQ abdominal discomfort. I will order a CT A/P to ensure absence of intra-abdominal pathology. I have asked him to see the PCPs at this office for additional evaluation of the rash as well as to establish care as he does not currently have an internist. Dr. Weinstein updated.\par \par 3/14/22 Mr Cooper presents to the office for discussion of a screening colonoscopy. The risks/benefits/alternatives for a colonoscopy were discussed. These include a less than 1% risk of bleeding should any polyps be biopsied and/or removed. There is also a less than 0.1% risk of perforation. The patient understands the need to adhere to a clear liquid diet the day prior to procedure as well as having to perform a bowel prep in order to allow for adequate visualization of the mucosal surfaces.  Followup colonoscopies will be scheduled based on the findings that are seen at the time of the procedure. Patient understands and is agreeable, and will proceed with consent and scheduling.\par

## 2022-03-14 NOTE — HISTORY OF PRESENT ILLNESS
[FreeTextEntry1] : Mr. Cooper presents to the office with reports of abdominal discomfort in LLQ since yesterday as well as a diffuse erythematous rash along his B/L UE and trunk. He denies any F/C and reports no unusual or new medications. BMs have been per usual, fairly frequent.\par \par 3/14/22 Mr. Cooper returns to the office for followup. Since his last office appt, he reports feeling fairly well. He has BMs daily without issue. He avoids certain foods such as tomato sauce to prevent diarrhea. He recently saw Dr. Alejandre for IHR. Dr. Alejandre would like him to obtain colonoscopy prior to surgical scheduling. He otherwise denies abdominal pain or rectal bleeding. In middle of divorce from his wife. Daughters are now 15 and 11.

## 2022-03-17 ENCOUNTER — NON-APPOINTMENT (OUTPATIENT)
Age: 52
End: 2022-03-17

## 2022-03-17 ENCOUNTER — APPOINTMENT (OUTPATIENT)
Dept: FAMILY MEDICINE | Facility: CLINIC | Age: 52
End: 2022-03-17
Payer: COMMERCIAL

## 2022-03-17 VITALS — WEIGHT: 222 LBS | BODY MASS INDEX: 30.11 KG/M2

## 2022-03-17 VITALS
OXYGEN SATURATION: 99 % | HEART RATE: 57 BPM | SYSTOLIC BLOOD PRESSURE: 130 MMHG | TEMPERATURE: 99 F | DIASTOLIC BLOOD PRESSURE: 83 MMHG | HEIGHT: 72 IN

## 2022-03-17 DIAGNOSIS — Z82.0 FAMILY HISTORY OF EPILEPSY AND OTHER DISEASES OF THE NERVOUS SYSTEM: ICD-10-CM

## 2022-03-17 DIAGNOSIS — Z82.3 FAMILY HISTORY OF STROKE: ICD-10-CM

## 2022-03-17 DIAGNOSIS — Z63.5 DISRUPTION OF FAMILY BY SEPARATION AND DIVORCE: ICD-10-CM

## 2022-03-17 PROCEDURE — 36415 COLL VENOUS BLD VENIPUNCTURE: CPT

## 2022-03-17 PROCEDURE — 81003 URINALYSIS AUTO W/O SCOPE: CPT | Mod: QW

## 2022-03-17 PROCEDURE — 90715 TDAP VACCINE 7 YRS/> IM: CPT

## 2022-03-17 PROCEDURE — 99204 OFFICE O/P NEW MOD 45 MIN: CPT | Mod: 25

## 2022-03-17 PROCEDURE — 90471 IMMUNIZATION ADMIN: CPT

## 2022-03-17 RX ORDER — ERGOCALCIFEROL 1.25 MG/1
1.25 MG CAPSULE, LIQUID FILLED ORAL
Qty: 12 | Refills: 0 | Status: ACTIVE | COMMUNITY
Start: 2022-03-17 | End: 1900-01-01

## 2022-03-17 SDOH — SOCIAL STABILITY - SOCIAL INSECURITY: DISRUPTION OF FAMILY BY SEPARATION AND DIVORCE: Z63.5

## 2022-03-17 NOTE — PLAN
[FreeTextEntry1] : Await colonoscopy\par Then schedule repair of hernia\par Will need to return for medical clearance 7 days PT Surgery

## 2022-03-17 NOTE — HEALTH RISK ASSESSMENT
[Never] : Never [0-4] : 0-4 [Yes] : Yes [2 - 4 times a month (2 pts)] : 2-4 times a month (2 points) [1 or 2 (0 pts)] : 1 or 2 (0 points) [Never (0 pts)] : Never (0 points) [No] : In the past 12 months have you used drugs other than those required for medical reasons? No [No falls in past year] : Patient reported no falls in the past year [0] : 2) Feeling down, depressed, or hopeless: Not at all (0) [Audit-CScore] : 0 [[id number]] : 0

## 2022-03-17 NOTE — PHYSICAL EXAM
[No Acute Distress] : no acute distress [Well Nourished] : well nourished [Well Developed] : well developed [Well-Appearing] : well-appearing [Normal Sclera/Conjunctiva] : normal sclera/conjunctiva [PERRL] : pupils equal round and reactive to light [EOMI] : extraocular movements intact [Normal Outer Ear/Nose] : the outer ears and nose were normal in appearance [Normal Oropharynx] : the oropharynx was normal [No JVD] : no jugular venous distention [No Lymphadenopathy] : no lymphadenopathy [Supple] : supple [Thyroid Normal, No Nodules] : the thyroid was normal and there were no nodules present [No Respiratory Distress] : no respiratory distress  [No Accessory Muscle Use] : no accessory muscle use [Clear to Auscultation] : lungs were clear to auscultation bilaterally [Normal Rate] : normal rate  [Regular Rhythm] : with a regular rhythm [Normal S1, S2] : normal S1 and S2 [No Murmur] : no murmur heard [No Carotid Bruits] : no carotid bruits [No Abdominal Bruit] : a ~M bruit was not heard ~T in the abdomen [No Varicosities] : no varicosities [Pedal Pulses Present] : the pedal pulses are present [No Edema] : there was no peripheral edema [No Palpable Aorta] : no palpable aorta [No Extremity Clubbing/Cyanosis] : no extremity clubbing/cyanosis [Soft] : abdomen soft [Non Tender] : non-tender [Non-distended] : non-distended [No Masses] : no abdominal mass palpated [No HSM] : no HSM [Normal Bowel Sounds] : normal bowel sounds [Normal Posterior Cervical Nodes] : no posterior cervical lymphadenopathy [Normal Anterior Cervical Nodes] : no anterior cervical lymphadenopathy [No CVA Tenderness] : no CVA  tenderness [No Spinal Tenderness] : no spinal tenderness [No Joint Swelling] : no joint swelling [Grossly Normal Strength/Tone] : grossly normal strength/tone [No Rash] : no rash [Coordination Grossly Intact] : coordination grossly intact [No Focal Deficits] : no focal deficits [Normal Gait] : normal gait [Deep Tendon Reflexes (DTR)] : deep tendon reflexes were 2+ and symmetric [Normal Affect] : the affect was normal [Normal Insight/Judgement] : insight and judgment were intact [de-identified] : Right epigastric hernia, Unbilical hernia, and left ventral hernia at site of previous colosostomy reversal

## 2022-03-17 NOTE — COUNSELING
[Fall prevention counseling provided] : Fall prevention counseling provided [Behavioral health counseling provided] : Behavioral health counseling provided [Potential consequences of obesity discussed] : Potential consequences of obesity discussed [Benefits of weight loss discussed] : Benefits of weight loss discussed [Encouraged to increase physical activity] : Encouraged to increase physical activity [Encouraged to use exercise tracking device] : Encouraged to use exercise tracking device [FreeTextEntry1] : Weight watchers

## 2022-03-17 NOTE — HISTORY OF PRESENT ILLNESS
[FreeTextEntry8] : New pt is here for establish care\par Two weeks ago seen in the ED and then seen by Dr Ryan. \par \par

## 2022-04-04 ENCOUNTER — APPOINTMENT (OUTPATIENT)
Dept: FAMILY MEDICINE | Facility: CLINIC | Age: 52
End: 2022-04-04
Payer: COMMERCIAL

## 2022-04-04 VITALS
DIASTOLIC BLOOD PRESSURE: 78 MMHG | OXYGEN SATURATION: 97 % | BODY MASS INDEX: 30.11 KG/M2 | WEIGHT: 222 LBS | HEART RATE: 64 BPM | TEMPERATURE: 98.2 F | SYSTOLIC BLOOD PRESSURE: 114 MMHG

## 2022-04-04 PROCEDURE — 99213 OFFICE O/P EST LOW 20 MIN: CPT

## 2022-04-06 LAB — SARS-COV-2 N GENE NPH QL NAA+PROBE: NOT DETECTED

## 2022-04-07 ENCOUNTER — APPOINTMENT (OUTPATIENT)
Dept: COLORECTAL SURGERY | Facility: CLINIC | Age: 52
End: 2022-04-07
Payer: COMMERCIAL

## 2022-04-07 PROCEDURE — 45378 DIAGNOSTIC COLONOSCOPY: CPT

## 2022-04-28 ENCOUNTER — APPOINTMENT (OUTPATIENT)
Dept: FAMILY MEDICINE | Facility: CLINIC | Age: 52
End: 2022-04-28
Payer: COMMERCIAL

## 2022-04-28 VITALS
OXYGEN SATURATION: 96 % | DIASTOLIC BLOOD PRESSURE: 87 MMHG | WEIGHT: 222 LBS | HEART RATE: 62 BPM | HEIGHT: 72 IN | TEMPERATURE: 98.4 F | BODY MASS INDEX: 30.07 KG/M2 | SYSTOLIC BLOOD PRESSURE: 131 MMHG

## 2022-04-28 DIAGNOSIS — B37.42 CANDIDAL BALANITIS: ICD-10-CM

## 2022-04-28 LAB
BILIRUB UR QL STRIP: NORMAL
GLUCOSE UR-MCNC: NORMAL
HCG UR QL: 1 EU/DL
HGB UR QL STRIP.AUTO: ABNORMAL
KETONES UR-MCNC: NORMAL
LEUKOCYTE ESTERASE UR QL STRIP: NORMAL
NITRITE UR QL STRIP: NORMAL
PH UR STRIP: 6.5
PROT UR STRIP-MCNC: NORMAL
SP GR UR STRIP: 1.02

## 2022-04-28 PROCEDURE — 81003 URINALYSIS AUTO W/O SCOPE: CPT | Mod: QW

## 2022-04-28 PROCEDURE — 99213 OFFICE O/P EST LOW 20 MIN: CPT | Mod: 25

## 2022-04-28 NOTE — PHYSICAL EXAM
[Normal] : no acute distress, well nourished, well developed and well-appearing [de-identified] : shaft of penis below head with dry, excoriated circumfirential erythemitous minimally flaky rash. No discharge

## 2022-04-28 NOTE — HISTORY OF PRESENT ILLNESS
[FreeTextEntry8] : pt is here for redness near his private area, notice about 2 weeks ago.\par Appeared about a month and a half after relations

## 2022-05-27 ENCOUNTER — OUTPATIENT (OUTPATIENT)
Dept: OUTPATIENT SERVICES | Facility: HOSPITAL | Age: 52
LOS: 1 days | End: 2022-05-27
Payer: COMMERCIAL

## 2022-05-27 DIAGNOSIS — K43.9 VENTRAL HERNIA WITHOUT OBSTRUCTION OR GANGRENE: ICD-10-CM

## 2022-05-27 DIAGNOSIS — Z41.9 ENCOUNTER FOR PROCEDURE FOR PURPOSES OTHER THAN REMEDYING HEALTH STATE, UNSPECIFIED: Chronic | ICD-10-CM

## 2022-05-27 DIAGNOSIS — Z01.818 ENCOUNTER FOR OTHER PREPROCEDURAL EXAMINATION: ICD-10-CM

## 2022-05-27 LAB
A1C WITH ESTIMATED AVERAGE GLUCOSE RESULT: 5.4 % — SIGNIFICANT CHANGE UP (ref 4–5.6)
ANION GAP SERPL CALC-SCNC: 6 MMOL/L — SIGNIFICANT CHANGE UP (ref 5–17)
APPEARANCE UR: CLEAR — SIGNIFICANT CHANGE UP
BASOPHILS # BLD AUTO: 0.07 K/UL — SIGNIFICANT CHANGE UP (ref 0–0.2)
BASOPHILS NFR BLD AUTO: 1.2 % — SIGNIFICANT CHANGE UP (ref 0–2)
BILIRUB UR-MCNC: NEGATIVE — SIGNIFICANT CHANGE UP
BUN SERPL-MCNC: 15 MG/DL — SIGNIFICANT CHANGE UP (ref 7–23)
CALCIUM SERPL-MCNC: 9 MG/DL — SIGNIFICANT CHANGE UP (ref 8.5–10.1)
CHLORIDE SERPL-SCNC: 108 MMOL/L — SIGNIFICANT CHANGE UP (ref 96–108)
CO2 SERPL-SCNC: 26 MMOL/L — SIGNIFICANT CHANGE UP (ref 22–31)
COLOR SPEC: YELLOW — SIGNIFICANT CHANGE UP
CREAT SERPL-MCNC: 1.3 MG/DL — SIGNIFICANT CHANGE UP (ref 0.5–1.3)
DIFF PNL FLD: ABNORMAL
EGFR: 67 ML/MIN/1.73M2 — SIGNIFICANT CHANGE UP
EOSINOPHIL # BLD AUTO: 0.14 K/UL — SIGNIFICANT CHANGE UP (ref 0–0.5)
EOSINOPHIL NFR BLD AUTO: 2.4 % — SIGNIFICANT CHANGE UP (ref 0–6)
ESTIMATED AVERAGE GLUCOSE: 108 MG/DL — SIGNIFICANT CHANGE UP (ref 68–114)
GLUCOSE SERPL-MCNC: 91 MG/DL — SIGNIFICANT CHANGE UP (ref 70–99)
GLUCOSE UR QL: NEGATIVE — SIGNIFICANT CHANGE UP
HCT VFR BLD CALC: 47.8 % — SIGNIFICANT CHANGE UP (ref 39–50)
HGB BLD-MCNC: 16.5 G/DL — SIGNIFICANT CHANGE UP (ref 13–17)
IMM GRANULOCYTES NFR BLD AUTO: 0.2 % — SIGNIFICANT CHANGE UP (ref 0–1.5)
KETONES UR-MCNC: NEGATIVE — SIGNIFICANT CHANGE UP
LEUKOCYTE ESTERASE UR-ACNC: ABNORMAL
LYMPHOCYTES # BLD AUTO: 1.96 K/UL — SIGNIFICANT CHANGE UP (ref 1–3.3)
LYMPHOCYTES # BLD AUTO: 33.7 % — SIGNIFICANT CHANGE UP (ref 13–44)
MCHC RBC-ENTMCNC: 30 PG — SIGNIFICANT CHANGE UP (ref 27–34)
MCHC RBC-ENTMCNC: 34.5 GM/DL — SIGNIFICANT CHANGE UP (ref 32–36)
MCV RBC AUTO: 86.9 FL — SIGNIFICANT CHANGE UP (ref 80–100)
MONOCYTES # BLD AUTO: 0.48 K/UL — SIGNIFICANT CHANGE UP (ref 0–0.9)
MONOCYTES NFR BLD AUTO: 8.3 % — SIGNIFICANT CHANGE UP (ref 2–14)
MRSA PCR RESULT.: SIGNIFICANT CHANGE UP
NEUTROPHILS # BLD AUTO: 3.15 K/UL — SIGNIFICANT CHANGE UP (ref 1.8–7.4)
NEUTROPHILS NFR BLD AUTO: 54.2 % — SIGNIFICANT CHANGE UP (ref 43–77)
NITRITE UR-MCNC: NEGATIVE — SIGNIFICANT CHANGE UP
PH UR: 6 — SIGNIFICANT CHANGE UP (ref 5–8)
PLATELET # BLD AUTO: 212 K/UL — SIGNIFICANT CHANGE UP (ref 150–400)
POTASSIUM SERPL-MCNC: 4.3 MMOL/L — SIGNIFICANT CHANGE UP (ref 3.5–5.3)
POTASSIUM SERPL-SCNC: 4.3 MMOL/L — SIGNIFICANT CHANGE UP (ref 3.5–5.3)
PROT UR-MCNC: NEGATIVE — SIGNIFICANT CHANGE UP
RBC # BLD: 5.5 M/UL — SIGNIFICANT CHANGE UP (ref 4.2–5.8)
RBC # FLD: 12.4 % — SIGNIFICANT CHANGE UP (ref 10.3–14.5)
S AUREUS DNA NOSE QL NAA+PROBE: SIGNIFICANT CHANGE UP
SODIUM SERPL-SCNC: 140 MMOL/L — SIGNIFICANT CHANGE UP (ref 135–145)
SP GR SPEC: 1.02 — SIGNIFICANT CHANGE UP (ref 1.01–1.02)
UROBILINOGEN FLD QL: NEGATIVE — SIGNIFICANT CHANGE UP
WBC # BLD: 5.81 K/UL — SIGNIFICANT CHANGE UP (ref 3.8–10.5)
WBC # FLD AUTO: 5.81 K/UL — SIGNIFICANT CHANGE UP (ref 3.8–10.5)

## 2022-05-27 PROCEDURE — 87640 STAPH A DNA AMP PROBE: CPT

## 2022-05-27 PROCEDURE — 87641 MR-STAPH DNA AMP PROBE: CPT

## 2022-05-27 PROCEDURE — 83036 HEMOGLOBIN GLYCOSYLATED A1C: CPT

## 2022-05-27 PROCEDURE — 81001 URINALYSIS AUTO W/SCOPE: CPT

## 2022-05-27 PROCEDURE — 36415 COLL VENOUS BLD VENIPUNCTURE: CPT

## 2022-05-27 PROCEDURE — 93005 ELECTROCARDIOGRAM TRACING: CPT

## 2022-05-27 PROCEDURE — 85025 COMPLETE CBC W/AUTO DIFF WBC: CPT

## 2022-05-27 PROCEDURE — 93010 ELECTROCARDIOGRAM REPORT: CPT

## 2022-05-27 PROCEDURE — 80048 BASIC METABOLIC PNL TOTAL CA: CPT

## 2022-05-27 PROCEDURE — 87086 URINE CULTURE/COLONY COUNT: CPT

## 2022-05-27 NOTE — ASU PATIENT PROFILE, ADULT - NSICDXPASTMEDICALHX_GEN_ALL_CORE_FT
PAST MEDICAL HISTORY:  Colostomy in place     COVID-19 virus infection 3/2020 1/2022    Diverticular disease surgery 5/2017    Diverticulitis     Fatty liver     Ventral hernia     VRE (vancomycin resistant enterococcus) culture positive History of

## 2022-05-27 NOTE — ASU PATIENT PROFILE, ADULT - FALL HARM RISK - UNIVERSAL INTERVENTIONS
Bed in lowest position, wheels locked, appropriate side rails in place/Call bell, personal items and telephone in reach/Instruct patient to call for assistance before getting out of bed or chair/Non-slip footwear when patient is out of bed/[place] to call system/Physically safe environment - no spills, clutter or unnecessary equipment/Purposeful Proactive Rounding/Room/bathroom lighting operational, light cord in reach

## 2022-05-27 NOTE — CHART NOTE - NSCHARTNOTEFT_GEN_A_CORE
52 year old male diagnosed with ventral hernia; he presents to PST for planned ventral hernia repair with abdominal wall mesh     Plan:  1. PST instructions given ; NPO status/  instructions to be given by ASU   2. Pt instructed to take following meds on day of surgery: none   3. Pt instructed to take routine evening medications unless indicated   4. Stop NSAIDS ( Aspirin Alev Motrin Mobic Diclofenac), herbal supplements , MVI , Vitamin fish oil 7 days prior to surgery  unless   directed by surgeon or cardiologist;   5. Medical Optimization  with Dr Kole Segura   6. EZ wash instructions given & mupirocin instructions given  7. Labs EKG  as per surgeon request   8. Pt instructed to self quarantine after Covid test   9. Covid Testing scheduled Pt notified and aware  10. Pt denies covid symptoms shortness of breath fever cough 52 year old male diagnosed with ventral hernia; he presents to PST for planned ventral hernia repair with abdominal wall mesh     Plan:  1. PST instructions given ; NPO status/  instructions to be given by ASU   2. Pt instructed to take following meds on day of surgery: none   3. Pt instructed to take routine evening medications unless indicated   4. Stop NSAIDS ( Aspirin Alev Motrin Mobic Diclofenac), herbal supplements , MVI , Vitamin fish oil 7 days prior to surgery  unless   directed by surgeon or cardiologist;   5. Medical Optimization  with Dr Kole Segura   6. EZ wash instructions given & mupirocin instructions given  7. Labs EKG  as per surgeon request   8. Pt instructed to self quarantine after Covid test   9. Covid Testing scheduled Pt notified and aware  10. Pt denies covid symptoms shortness of breath fever cough  11. Pt c/o dysuria last week ua culture done

## 2022-05-28 DIAGNOSIS — Z01.818 ENCOUNTER FOR OTHER PREPROCEDURAL EXAMINATION: ICD-10-CM

## 2022-05-28 DIAGNOSIS — K43.9 VENTRAL HERNIA WITHOUT OBSTRUCTION OR GANGRENE: ICD-10-CM

## 2022-05-28 LAB
CULTURE RESULTS: SIGNIFICANT CHANGE UP
SPECIMEN SOURCE: SIGNIFICANT CHANGE UP

## 2022-05-31 ENCOUNTER — APPOINTMENT (OUTPATIENT)
Dept: FAMILY MEDICINE | Facility: CLINIC | Age: 52
End: 2022-05-31
Payer: COMMERCIAL

## 2022-05-31 VITALS
BODY MASS INDEX: 30.11 KG/M2 | OXYGEN SATURATION: 97 % | WEIGHT: 222 LBS | HEART RATE: 66 BPM | DIASTOLIC BLOOD PRESSURE: 79 MMHG | TEMPERATURE: 98 F | SYSTOLIC BLOOD PRESSURE: 113 MMHG

## 2022-05-31 DIAGNOSIS — Z12.11 ENCOUNTER FOR SCREENING FOR MALIGNANT NEOPLASM OF COLON: ICD-10-CM

## 2022-05-31 DIAGNOSIS — Z09 ENCOUNTER FOR FOLLOW-UP EXAMINATION AFTER COMPLETED TREATMENT FOR CONDITIONS OTHER THAN MALIGNANT NEOPLASM: ICD-10-CM

## 2022-05-31 DIAGNOSIS — K76.0 FATTY (CHANGE OF) LIVER, NOT ELSEWHERE CLASSIFIED: ICD-10-CM

## 2022-05-31 DIAGNOSIS — Z43.3 ENCOUNTER FOR ATTENTION TO COLOSTOMY: ICD-10-CM

## 2022-05-31 PROCEDURE — 99215 OFFICE O/P EST HI 40 MIN: CPT

## 2022-05-31 RX ORDER — NYSTATIN AND TRIAMCINOLONE ACETONIDE 100000; 1 MG/G; MG/G
100000-0.1 CREAM TOPICAL
Qty: 1 | Refills: 0 | Status: DISCONTINUED | COMMUNITY
Start: 2022-04-28 | End: 2022-05-31

## 2022-05-31 NOTE — HISTORY OF PRESENT ILLNESS
[No Pertinent Cardiac History] : no history of aortic stenosis, atrial fibrillation, coronary artery disease, recent myocardial infarction, or implantable device/pacemaker [No Pertinent Pulmonary History] : no history of asthma, COPD, sleep apnea, or smoking [No Adverse Anesthesia Reaction] : no adverse anesthesia reaction in self or family member [(Patient denies any chest pain, claudication, dyspnea on exertion, orthopnea, palpitations or syncope)] : Patient denies any chest pain, claudication, dyspnea on exertion, orthopnea, palpitations or syncope [Good (7-10 METs)] : Good (7-10 METs) [Anti-Platelet Agents: _____] : Anti-Platelet Agents: [unfilled] [NSAIDs: _____] : NSAIDs: [unfilled] [Chronic Anticoagulation] : no chronic anticoagulation [Chronic Kidney Disease] : no chronic kidney disease [Diabetes] : no diabetes [FreeTextEntry1] : Ventral Hernia Repair at [facility] [FreeTextEntry2] : 06/02/2022 [FreeTextEntry3] : Markus Alejandre MD

## 2022-05-31 NOTE — CONSULT LETTER
[Dear  ___] : Dear  [unfilled], [( Thank you for referring [unfilled] for consultation for _____ )] : Thank you for referring [unfilled] for consultation for [unfilled] [Please see my note below.] : Please see my note below. [Consult Closing:] : Thank you very much for allowing me to participate in the care of this patient.  If you have any questions, please do not hesitate to contact me. [Sincerely,] : Sincerely, [FreeTextEntry3] : Kole Segura MD, FAAFP\par Chair, Family Medicine, [facility]\par , Family Medicine, [facility], [facility]\par , Family Medicine, [employer]\par , Family and Community Medicine, [facility]\par \par

## 2022-05-31 NOTE — PLAN
[FreeTextEntry1] : The patient has been advised to remain NPO after the midright prior to surgery.\par Risks and benefits of the surgery have been discussed by the operative physician.\par The patient has been advised to avoid taking aspirin and aspirin containing products from now until surgery.\par \par Thank you for including me in the care of your patient.\par

## 2022-05-31 NOTE — RESULTS/DATA
[] : results reviewed [de-identified] : WNL [de-identified] : WNL [de-identified] : Sinus Bradycardia at 55 bpm without acute changes [de-identified] : MRSA Negative\par Urine Neg\par Urine Culture Neg

## 2022-05-31 NOTE — PHYSICAL EXAM
[No Acute Distress] : no acute distress [Well Nourished] : well nourished [Well Developed] : well developed [Well-Appearing] : well-appearing [Normal Sclera/Conjunctiva] : normal sclera/conjunctiva [PERRL] : pupils equal round and reactive to light [EOMI] : extraocular movements intact [Normal Outer Ear/Nose] : the outer ears and nose were normal in appearance [Normal Oropharynx] : the oropharynx was normal [No JVD] : no jugular venous distention [No Lymphadenopathy] : no lymphadenopathy [Supple] : supple [Thyroid Normal, No Nodules] : the thyroid was normal and there were no nodules present [No Respiratory Distress] : no respiratory distress  [No Accessory Muscle Use] : no accessory muscle use [Clear to Auscultation] : lungs were clear to auscultation bilaterally [Normal Rate] : normal rate  [Regular Rhythm] : with a regular rhythm [Normal S1, S2] : normal S1 and S2 [No Murmur] : no murmur heard [No Carotid Bruits] : no carotid bruits [No Abdominal Bruit] : a ~M bruit was not heard ~T in the abdomen [No Varicosities] : no varicosities [Pedal Pulses Present] : the pedal pulses are present [No Edema] : there was no peripheral edema [No Palpable Aorta] : no palpable aorta [No Extremity Clubbing/Cyanosis] : no extremity clubbing/cyanosis [Soft] : abdomen soft [Non Tender] : non-tender [Non-distended] : non-distended [No Masses] : no abdominal mass palpated [No HSM] : no HSM [Normal Bowel Sounds] : normal bowel sounds [Normal Posterior Cervical Nodes] : no posterior cervical lymphadenopathy [Normal Anterior Cervical Nodes] : no anterior cervical lymphadenopathy [No CVA Tenderness] : no CVA  tenderness [No Spinal Tenderness] : no spinal tenderness [No Joint Swelling] : no joint swelling [Grossly Normal Strength/Tone] : grossly normal strength/tone [No Rash] : no rash [Coordination Grossly Intact] : coordination grossly intact [No Focal Deficits] : no focal deficits [Normal Gait] : normal gait [Deep Tendon Reflexes (DTR)] : deep tendon reflexes were 2+ and symmetric [Normal Affect] : the affect was normal [Normal Insight/Judgement] : insight and judgment were intact [de-identified] : Unbilical and 2 ventral hernias

## 2022-06-02 ENCOUNTER — INPATIENT (INPATIENT)
Facility: HOSPITAL | Age: 52
LOS: 1 days | Discharge: HOME | DRG: 337 | End: 2022-06-04
Attending: SURGERY | Admitting: SURGERY
Payer: COMMERCIAL

## 2022-06-02 ENCOUNTER — TRANSCRIPTION ENCOUNTER (OUTPATIENT)
Age: 52
End: 2022-06-02

## 2022-06-02 VITALS
HEIGHT: 72 IN | WEIGHT: 213.41 LBS | SYSTOLIC BLOOD PRESSURE: 114 MMHG | DIASTOLIC BLOOD PRESSURE: 81 MMHG | RESPIRATION RATE: 15 BRPM | OXYGEN SATURATION: 98 % | TEMPERATURE: 98 F | HEART RATE: 56 BPM

## 2022-06-02 DIAGNOSIS — K43.9 VENTRAL HERNIA WITHOUT OBSTRUCTION OR GANGRENE: ICD-10-CM

## 2022-06-02 DIAGNOSIS — Z41.9 ENCOUNTER FOR PROCEDURE FOR PURPOSES OTHER THAN REMEDYING HEALTH STATE, UNSPECIFIED: Chronic | ICD-10-CM

## 2022-06-02 LAB — SARS-COV-2 N GENE NPH QL NAA+PROBE: NOT DETECTED

## 2022-06-02 PROCEDURE — 84100 ASSAY OF PHOSPHORUS: CPT

## 2022-06-02 PROCEDURE — 80048 BASIC METABOLIC PNL TOTAL CA: CPT

## 2022-06-02 PROCEDURE — 85027 COMPLETE CBC AUTOMATED: CPT

## 2022-06-02 PROCEDURE — 83735 ASSAY OF MAGNESIUM: CPT

## 2022-06-02 PROCEDURE — 36415 COLL VENOUS BLD VENIPUNCTURE: CPT

## 2022-06-02 RX ORDER — ACETAMINOPHEN 500 MG
1000 TABLET ORAL ONCE
Refills: 0 | Status: COMPLETED | OUTPATIENT
Start: 2022-06-02 | End: 2022-06-02

## 2022-06-02 RX ORDER — CEFAZOLIN SODIUM 1 G
2000 VIAL (EA) INJECTION ONCE
Refills: 0 | Status: COMPLETED | OUTPATIENT
Start: 2022-06-02 | End: 2022-06-02

## 2022-06-02 RX ORDER — HEPARIN SODIUM 5000 [USP'U]/ML
5000 INJECTION INTRAVENOUS; SUBCUTANEOUS EVERY 8 HOURS
Refills: 0 | Status: DISCONTINUED | OUTPATIENT
Start: 2022-06-02 | End: 2022-06-03

## 2022-06-02 RX ORDER — CEFAZOLIN SODIUM 1 G
2000 VIAL (EA) INJECTION EVERY 8 HOURS
Refills: 0 | Status: DISCONTINUED | OUTPATIENT
Start: 2022-06-03 | End: 2022-06-04

## 2022-06-02 RX ORDER — SODIUM CHLORIDE 9 MG/ML
1000 INJECTION, SOLUTION INTRAVENOUS
Refills: 0 | Status: DISCONTINUED | OUTPATIENT
Start: 2022-06-02 | End: 2022-06-02

## 2022-06-02 RX ORDER — LANOLIN ALCOHOL/MO/W.PET/CERES
5 CREAM (GRAM) TOPICAL AT BEDTIME
Refills: 0 | Status: COMPLETED | OUTPATIENT
Start: 2022-06-02 | End: 2022-06-02

## 2022-06-02 RX ORDER — ONDANSETRON 8 MG/1
4 TABLET, FILM COATED ORAL EVERY 8 HOURS
Refills: 0 | Status: DISCONTINUED | OUTPATIENT
Start: 2022-06-02 | End: 2022-06-04

## 2022-06-02 RX ORDER — ONDANSETRON 8 MG/1
4 TABLET, FILM COATED ORAL ONCE
Refills: 0 | Status: DISCONTINUED | OUTPATIENT
Start: 2022-06-02 | End: 2022-06-02

## 2022-06-02 RX ORDER — FENTANYL CITRATE 50 UG/ML
50 INJECTION INTRAVENOUS
Refills: 0 | Status: DISCONTINUED | OUTPATIENT
Start: 2022-06-02 | End: 2022-06-02

## 2022-06-02 RX ORDER — MORPHINE SULFATE 50 MG/1
2 CAPSULE, EXTENDED RELEASE ORAL EVERY 4 HOURS
Refills: 0 | Status: DISCONTINUED | OUTPATIENT
Start: 2022-06-02 | End: 2022-06-04

## 2022-06-02 RX ORDER — OXYCODONE HYDROCHLORIDE 5 MG/1
10 TABLET ORAL ONCE
Refills: 0 | Status: DISCONTINUED | OUTPATIENT
Start: 2022-06-02 | End: 2022-06-02

## 2022-06-02 RX ORDER — CEFAZOLIN SODIUM 1 G
VIAL (EA) INJECTION
Refills: 0 | Status: DISCONTINUED | OUTPATIENT
Start: 2022-06-02 | End: 2022-06-04

## 2022-06-02 RX ADMIN — FENTANYL CITRATE 50 MICROGRAM(S): 50 INJECTION INTRAVENOUS at 16:03

## 2022-06-02 RX ADMIN — HEPARIN SODIUM 5000 UNIT(S): 5000 INJECTION INTRAVENOUS; SUBCUTANEOUS at 21:28

## 2022-06-02 RX ADMIN — Medication 400 MILLIGRAM(S): at 18:03

## 2022-06-02 RX ADMIN — Medication 100 MILLIGRAM(S): at 20:55

## 2022-06-02 RX ADMIN — Medication 5 MILLIGRAM(S): at 23:09

## 2022-06-02 NOTE — ASU PATIENT PROFILE, ADULT - NSICDXPASTSURGICALHX_GEN_ALL_CORE_FT
PAST SURGICAL HISTORY:  Elective surgery Intestinal resection with Colostomy . 5/19/2017 and reversal in 10/2017

## 2022-06-02 NOTE — ASU PATIENT PROFILE, ADULT - NSICDXPASTMEDICALHX_GEN_ALL_CORE_FT
PAST MEDICAL HISTORY:  Colostomy in place removed 10/2017    COVID-19 virus infection 3/2020 1/2022    Diverticular disease surgery 5/2017    Diverticulitis     Fatty liver     Ventral hernia     VRE (vancomycin resistant enterococcus) culture positive History of

## 2022-06-03 ENCOUNTER — APPOINTMENT (OUTPATIENT)
Dept: CARDIOLOGY | Facility: CLINIC | Age: 52
End: 2022-06-03

## 2022-06-03 LAB
ANION GAP SERPL CALC-SCNC: 8 MMOL/L — SIGNIFICANT CHANGE UP (ref 5–17)
BUN SERPL-MCNC: 15 MG/DL — SIGNIFICANT CHANGE UP (ref 7–23)
CALCIUM SERPL-MCNC: 8.6 MG/DL — SIGNIFICANT CHANGE UP (ref 8.5–10.1)
CHLORIDE SERPL-SCNC: 106 MMOL/L — SIGNIFICANT CHANGE UP (ref 96–108)
CO2 SERPL-SCNC: 25 MMOL/L — SIGNIFICANT CHANGE UP (ref 22–31)
CREAT SERPL-MCNC: 1.19 MG/DL — SIGNIFICANT CHANGE UP (ref 0.5–1.3)
EGFR: 74 ML/MIN/1.73M2 — SIGNIFICANT CHANGE UP
GLUCOSE SERPL-MCNC: 115 MG/DL — HIGH (ref 70–99)
HCT VFR BLD CALC: 44.7 % — SIGNIFICANT CHANGE UP (ref 39–50)
HGB BLD-MCNC: 15.1 G/DL — SIGNIFICANT CHANGE UP (ref 13–17)
MAGNESIUM SERPL-MCNC: 2.4 MG/DL — SIGNIFICANT CHANGE UP (ref 1.6–2.6)
MCHC RBC-ENTMCNC: 30.1 PG — SIGNIFICANT CHANGE UP (ref 27–34)
MCHC RBC-ENTMCNC: 33.8 GM/DL — SIGNIFICANT CHANGE UP (ref 32–36)
MCV RBC AUTO: 89.2 FL — SIGNIFICANT CHANGE UP (ref 80–100)
PHOSPHATE SERPL-MCNC: 3 MG/DL — SIGNIFICANT CHANGE UP (ref 2.5–4.5)
PLATELET # BLD AUTO: 223 K/UL — SIGNIFICANT CHANGE UP (ref 150–400)
POTASSIUM SERPL-MCNC: 4.4 MMOL/L — SIGNIFICANT CHANGE UP (ref 3.5–5.3)
POTASSIUM SERPL-SCNC: 4.4 MMOL/L — SIGNIFICANT CHANGE UP (ref 3.5–5.3)
RBC # BLD: 5.01 M/UL — SIGNIFICANT CHANGE UP (ref 4.2–5.8)
RBC # FLD: 12.6 % — SIGNIFICANT CHANGE UP (ref 10.3–14.5)
SODIUM SERPL-SCNC: 139 MMOL/L — SIGNIFICANT CHANGE UP (ref 135–145)
WBC # BLD: 15.71 K/UL — HIGH (ref 3.8–10.5)
WBC # FLD AUTO: 15.71 K/UL — HIGH (ref 3.8–10.5)

## 2022-06-03 PROCEDURE — 99221 1ST HOSP IP/OBS SF/LOW 40: CPT

## 2022-06-03 PROCEDURE — 99222 1ST HOSP IP/OBS MODERATE 55: CPT

## 2022-06-03 RX ORDER — ENOXAPARIN SODIUM 100 MG/ML
40 INJECTION SUBCUTANEOUS EVERY 24 HOURS
Refills: 0 | Status: DISCONTINUED | OUTPATIENT
Start: 2022-06-03 | End: 2022-06-04

## 2022-06-03 RX ORDER — ENOXAPARIN SODIUM 100 MG/ML
40 INJECTION SUBCUTANEOUS
Qty: 10 | Refills: 0
Start: 2022-06-03 | End: 2022-06-12

## 2022-06-03 RX ORDER — ACETAMINOPHEN 500 MG
650 TABLET ORAL EVERY 6 HOURS
Refills: 0 | Status: DISCONTINUED | OUTPATIENT
Start: 2022-06-03 | End: 2022-06-04

## 2022-06-03 RX ADMIN — HEPARIN SODIUM 5000 UNIT(S): 5000 INJECTION INTRAVENOUS; SUBCUTANEOUS at 05:58

## 2022-06-03 RX ADMIN — Medication 650 MILLIGRAM(S): at 21:11

## 2022-06-03 RX ADMIN — Medication 100 MILLIGRAM(S): at 13:40

## 2022-06-03 RX ADMIN — Medication 650 MILLIGRAM(S): at 21:41

## 2022-06-03 RX ADMIN — ENOXAPARIN SODIUM 40 MILLIGRAM(S): 100 INJECTION SUBCUTANEOUS at 16:53

## 2022-06-03 RX ADMIN — Medication 100 MILLIGRAM(S): at 05:58

## 2022-06-03 RX ADMIN — Medication 650 MILLIGRAM(S): at 04:18

## 2022-06-03 RX ADMIN — Medication 100 MILLIGRAM(S): at 21:11

## 2022-06-03 RX ADMIN — Medication 650 MILLIGRAM(S): at 04:48

## 2022-06-03 NOTE — PROGRESS NOTE ADULT - ASSESSMENT
Pt is a 52 year old male s/p ventral hernia repair POD1    -Monitor vitals  -Diet: regular  -Pain control prn  -Anti emetic prn  -dc IVF  -Monitor surgical site  -Monitor daily labs   -Monitor LUIS M drain output - maintain   -Encourage oob/ambulation  -Encourage IS use  -Anticoagulation team eval for poss home lov  -DC planing for sat vs sunday  -DVT/GI ppx    Discussed with Dr. Alejandre, surgery attending  JADIEL PGY5

## 2022-06-03 NOTE — CONSULT NOTE ADULT - SUBJECTIVE AND OBJECTIVE BOX
SUBJECTIVE:    CHIEF COMPLAINT:  Patient is a 52y old  Male who presents with a chief complaint of hernia repair    HPI:  Pt presents for unbilical and ventral and post operative hernia repairs. Tolerated surgery well Pain controlled. No complaints      Active Problems  Encounter for preventive health examination (V70.0) (Z00.00)  Candidiasis of penis (112.2) (B37.42)  Fatty liver (571.8) (K76.0)  Ileocolic anastomotic leak (997.49) (K91.89)  LLQ abdominal tenderness (789.64) (R10.814)  Rash (782.1) (R21)  Tetanus-diphtheria (Td) vaccination (V06.5) (Z23)  Umbilical hernia without obstruction and without gangrene (553.1) (K42.9)  Ventral hernia without obstruction or gangrene (553.20) (K43.9)    Past Medical History  History of Attention to colostomy (V55.3) (Z43.3)  History of Diverticulitis (562.11) (K57.92)  History of Postop check (V67.00) (Z09)  History of Screening for colon cancer (V76.51) (Z12.11)  History of Screening for viral disease (V73.99) (Z11.59)    Surgical History  History of Colostomy Closure  · 10/26/17 Ex-lap, extensive CLIVE, reversal of Hartmanns, incisional hernia repair with  myocutaneous flaps - SSS  History of Partial Colectomy, End Colostomy & Distal Segment Closure  · 5/21/17 Emergency Hartmanns with ileocolic resection - SSS    Family History  Family history of Alzheimer's disease (V17.2) (Z82.0) : Father  Family history of CVA (V17.1) (Z82.3) : Mother  Family history of Parkinson's disease (V17.2) (Z82.0) : Father  Family history of tremor (V17.2) (Z82.0) : Brother    Social History   (V61.03) (Z63.5)  Does not use illicit drugs (V49.89) (Z78.9)  Former smoker (V15.82) (Z87.891)  Has 2 children  Occupation  · Electrical Sales  Social alcohol use       MEDICATIONS:  MEDICATIONS  (STANDING):  ceFAZolin   IVPB      ceFAZolin   IVPB 2000 milliGRAM(s) IV Intermittent every 8 hours  heparin   Injectable 5000 Unit(s) SubCutaneous every 8 hours      Allergies  No Known Allergies    REVIEW OF SYSTEMS:  CONSTITUTIONAL: No weakness, fevers or chills  EYES/ENT: No visual changes;  No vertigo or throat pain   NECK: No pain or stiffness  RESPIRATORY: No cough, wheezing, hemoptysis; No shortness of breath  CARDIOVASCULAR: No chest pain or palpitations  GASTROINTESTINAL: No abdominal or epigastric pain. No nausea, vomiting, or hematemesis; No diarrhea or constipation. No melena or hematochezia.  GENITOURINARY: No dysuria, frequency or hematuria  NEUROLOGICAL: No numbness or weakness  SKIN: No itching, burning, rashes, or lesions   All other review of systems is negative unless indicated above  OBJECTIVE:    PHYSICAL EXAM:  Vital Signs Last 24 Hrs  T(C): 36.4 (03 Jun 2022 09:02), Max: 37 (02 Jun 2022 21:52)  T(F): 97.5 (03 Jun 2022 09:02), Max: 98.6 (02 Jun 2022 21:52)  HR: 68 (03 Jun 2022 09:02) (59 - 81)  BP: 109/56 (03 Jun 2022 09:02) (98/53 - 115/65)  BP(mean): --  RR: 18 (03 Jun 2022 09:02) (10 - 18)  SpO2: 97% (03 Jun 2022 09:02) (95% - 100%)  Constitutional: NAD, awake and alert, well-developed  HEENT: PERRLA, EOMI, Pharynx Clear  Neck: Supple, No JVD, No Lymphadenopathy  Respiratory: Breath sounds are clear bilaterally, No wheezing, rales or rhonchi  Cardiovascular: S1 and S2, regular rate and rhythm, no Murmurs, rubs or gallops  Gastrointestinal: Bowel Sounds present but diminished, soft, incision sites clean and dry. Drain in place and draining serosanguinous liquid  Extremities: Without clubbing, cyanosis or edema  Vascular: 2+ peripheral pulses  Neurological: A/O x 3, no focal deficits  Musculoskeletal: FROM upper and lower extremities  Skin: No rashes    LABS:                         15.1   15.71 )-----------( 223      ( 03 Jun 2022 06:03 )             44.7     06-03    139  |  106  |  15  ----------------------------<  115<H>  4.4   |  25  |  1.19    Ca    8.6      03 Jun 2022 06:03  Phos  3.0     06-03  Mg     2.4     06-03

## 2022-06-03 NOTE — CONSULT NOTE ADULT - SUBJECTIVE AND OBJECTIVE BOX
HPI:      Patient is a 52y old  Male who presents with a chief complaint of  abdominal pain s/p  Ventral Hernia repair (2022 13:39)      Consulted by Dr. Markus Alejandre   for VTE prophylaxis, risk stratification, and anticoagulation management.    PAST MEDICAL & SURGICAL HISTORY:  Fatty liver      Diverticular disease  surgery 2017      Colostomy in place  removed 10/2017      VRE (vancomycin resistant enterococcus) culture positive  History of      Diverticulitis      Ventral hernia      COVID-19 virus infection  3/2020 2022      Elective surgery  Intestinal resection with Colostomy . 2017 and reversal in 10/2017    Interval History  6/3/2022:patient seen at bedside, discussed the necessity of DVT prophylaxis due to his extensive abd surgery, patient denies any h/o vte,stroke or bleeding, ambulating, agreed for Lovenox, willing to do self injections.          CrCl:100.3  BMI:28.9    Caprini VTE Risk Score:CAPRINI SCORE  AGE RELATED RISK FACTORS                                                       MOBILITY RELATED FACTORS  [x ] Age 41-60 years                                            (1 Point)                  [ x] Bed rest /restricted mobility                             (1 Point)  [ ] Age: 61-74 years                                           (2 Points)                [ ] Plaster cast                                                   (2 Points)  [ ] Age= 75 years                                              (3 Points)                 [ ] Bed bound for more than 72 hours                   (2 Points)    DISEASE RELATED RISK FACTORS                                               GENDER SPECIFIC FACTORS  [ ] Edema in the lower extremities                       (1 Point)           [ ] Pregnancy                                                            (1 Point)  [ ] Varicose veins                                               (1 Point)                  [ ] Post-partum < 6 weeks                                      (1 Point)             [x ] BMI > 25 Kg/m2                                            (1 Point)                  [ ] Hormonal therapy or oral contraception       (1 Point)                 [ ] Sepsis (in the previous month)                        (1 Point)             [ ] History of pregnancy complications                (1Point)  [ ] Pneumonia or serious lung disease                                             [ ] Unexplained or recurrent  (=/>3), premature                                 (In the previous month)                               (1 Point)                birth with toxemia or growth-restricted infant (1 Point)  [ ] Abnormal pulmonary function test            (1 Point)                                   SURGERY RELATED RISK FACTORS  [ ] Acute myocardial infarction                       (1 Point)                  [ ]  Section                                         (1 Point)  [ ] Congestive heart failure (in the previous month) (1 Point)   [ ] Minor surgery   lasting <45 minutes       (1 Point)   [ ] Inflammatory bowel disease                             (1 Point)          [ ] Arthroscopic surgery                                  (2 Points)  [ ] Central venous access                                    (2 Points)            [x ] General surgery lasting >45 minutes      (2 Points)       [ ] Stroke (in the previous month)                  (5 Points)            [ ] Elective major lower extremity arthroplasty (5 Points)                                   [  ] Malignancy (present or past include skin melanoma                                          but exclude  basal skin cell)    (2 points)                                      TRAUMA RELATED RISK FACTORS                HEMATOLOGY RELATED FACTORS                                  [ ] Fracture of the hip, pelvis, or leg                       (5 Points)  [ ] Prior episodes of VTE                                     (3 Points)          [ ] Acute spinal cord injury (in the previous month)  (5 Points)  [ ] Positive family history for VTE                         (3 Points)       [ ] Paralysis (less than 1 month)                          (5 Points)  [ ] Prothrombin 89804 A                                      (3 Points)         [ ] Multiple Trauma (within 1month)                 (5Points)                                                                                                                                                                [ ] Factor V Leiden                                          (3 Points)                                OTHER RISK FACTORS                          [ ] Lupus anticoagulants                                     (3 Points)                       [ ] BMI > 40                          (1 Point)                                                         [ ] Anticardiolipin antibodies                                (3 Points)                   [ ] Smoking                              (1Point)                                                [ ] High homocysteine in the blood                      (3 Points)                [  ] Diabetes requiring insulin (1point)                         [ ] Other congenital or acquired thrombophilia       (3 Points)          [  ] Chemotherapy                   (1 Point)  [ ] Heparin induced thrombocytopenia                  (3 Points)             [  ] Blood Transfusion                (1 point)                                                                                                             Total Score [  5        ]                                                                                                                                                                                                                                                                                                                                                                                                                                         IMPROVE Bleeding Risk Score:2.5    Falls Risk:   High (  )  Mod ( x )  Low (  )      FAMILY HISTORY:  No pertinent family history in first degree relatives      Denies any personal or familial history of clotting or bleeding disorders.    Allergies    No Known Allergies    Intolerances        REVIEW OF SYSTEMS    (  )Fever	     (  )Constipation	(  )SOB				(  )Headache	(  )Dysuria  (  )Chills	     (  )Melena	(  )Dyspnea present on exertion	                    (  )Dizziness                    (  )Polyuria  (  )Nausea	     (  )Hematochezia	(  )Cough			                    (  )Syncope   	(  )Hematuria  (  )Vomiting    (  )Chest Pain	(  )Wheezing			(  )Weakness  (  )Diarrhea     (  )Palpitations	(  )Anorexia			(  )joint pain (x) surgical site pain    All  other review of systems negative: Yes    Vital Signs Last 24 Hrs  T(C): 36.4 (2022 09:02), Max: 37 (2022 21:52)  T(F): 97.5 (2022 09:02), Max: 98.6 (2022 21:52)  HR: 68 (2022 09:02) (59 - 81)  BP: 109/56 (2022 09:02) (98/53 - 115/65)  BP(mean): --  RR: 18 (2022 09:02) (10 - 18)  SpO2: 97% (2022 09:02) (95% - 100%)    PHYSICAL EXAM:    Constitutional: Appears Well    Neurological: A& O x 3    Skin: Warm    Respiratory and Thorax: normal effort; Breath sounds: normal; No rales/wheezing/rhonchi  	  Cardiovascular: S1, S2, regular, NMBR	    Gastrointestinal: BS diminished, softly distended, tender	    Genitourinary:  Bladder nondistended, nontender    Musculoskeletal:   General Right:   no muscle/joint tenderness,   normal tone, no joint swelling,   ROM: full	    General Left:   no muscle/joint tenderness,   normal tone, no joint swelling,   ROM: full    Abd:  : Dressing CDI;         JPx1 draining small bloody drainage     Lower extrems:   Right: no calf tenderness              negative nabeel's sign               + pedal pulses    Left:   no calf tenderness              negative nabeel's sign               + pedal pulses                          15.1   15.71 )-----------( 223      ( 2022 06:03 )             44.7       06-03    139  |  106  |  15  ----------------------------<  115<H>  4.4   |  25  |  1.19    Ca    8.6      2022 06:03  Phos  3.0     06-03  Mg     2.4     06-03      < from: CT Abdomen and Pelvis w/ Oral Cont and w/ IV Cont (22 @ 16:48) >  IMPRESSION: Multiple ventral hernias containing small and large bowel.    Trace mesenteric fluid of uncertain etiology.      < end of copied text >    				    MEDICATIONS  (STANDING):  ceFAZolin   IVPB      ceFAZolin   IVPB 2000 milliGRAM(s) IV Intermittent every 8 hours  enoxaparin Injectable 40 milliGRAM(s) SubCutaneous every 24 hours          DVT Prophylaxis:  LMWH                   ( x )  Heparin SQ           (  )  Coumadin             (  )  Xarelto                  (  )  Eliquis                   (  )  Venodynes           ( x )  Ambulation          (x  )  UFH                       (  )  Contraindicated  (  )  EC ASPIRIN       (  )

## 2022-06-03 NOTE — CONSULT NOTE ADULT - ASSESSMENT
Patient is a 52y old  Male who presents with a chief complaint of  abdominal pain s/p  Ventral Hernia repair (03 Jun 2022 13:39). Consulted by Dr. Markus Alejandre   for VTE prophylaxis, risk stratification, and anticoagulation management. Patient is moderate risk for vte,and low bleeding risk.    plan  :Lovenox 40mg sq daily for 10 days post procedure, script sent  :daily cbc/bmp  :LE Venodynes  : increase mobility as tolerated  :Thanks for consult will f/u  :Dispo:Home      
52y old  Male who presents with a chief complaint of hernia repair    Assessment  Umbilcal and Ventral Hernias  POD #1 s/p open hernia repairs  Fatty Liver Disease    Plan:  Acvance diet as per surgery  Pain control  Ambulate  Continue drains for now  DVT prophylaxis with SQ Heparin    Thank you for the courtesy of this consultation and your confidence in my care of your patients

## 2022-06-04 ENCOUNTER — TRANSCRIPTION ENCOUNTER (OUTPATIENT)
Age: 52
End: 2022-06-04

## 2022-06-04 VITALS
OXYGEN SATURATION: 96 % | HEART RATE: 71 BPM | TEMPERATURE: 100 F | SYSTOLIC BLOOD PRESSURE: 132 MMHG | RESPIRATION RATE: 18 BRPM | DIASTOLIC BLOOD PRESSURE: 79 MMHG

## 2022-06-04 LAB
ANION GAP SERPL CALC-SCNC: 4 MMOL/L — LOW (ref 5–17)
BUN SERPL-MCNC: 15 MG/DL — SIGNIFICANT CHANGE UP (ref 7–23)
CALCIUM SERPL-MCNC: 8.8 MG/DL — SIGNIFICANT CHANGE UP (ref 8.5–10.1)
CHLORIDE SERPL-SCNC: 107 MMOL/L — SIGNIFICANT CHANGE UP (ref 96–108)
CO2 SERPL-SCNC: 28 MMOL/L — SIGNIFICANT CHANGE UP (ref 22–31)
CREAT SERPL-MCNC: 1.22 MG/DL — SIGNIFICANT CHANGE UP (ref 0.5–1.3)
EGFR: 72 ML/MIN/1.73M2 — SIGNIFICANT CHANGE UP
GLUCOSE SERPL-MCNC: 91 MG/DL — SIGNIFICANT CHANGE UP (ref 70–99)
HCT VFR BLD CALC: 45.4 % — SIGNIFICANT CHANGE UP (ref 39–50)
HGB BLD-MCNC: 15 G/DL — SIGNIFICANT CHANGE UP (ref 13–17)
MAGNESIUM SERPL-MCNC: 2.4 MG/DL — SIGNIFICANT CHANGE UP (ref 1.6–2.6)
MCHC RBC-ENTMCNC: 29.9 PG — SIGNIFICANT CHANGE UP (ref 27–34)
MCHC RBC-ENTMCNC: 33 GM/DL — SIGNIFICANT CHANGE UP (ref 32–36)
MCV RBC AUTO: 90.4 FL — SIGNIFICANT CHANGE UP (ref 80–100)
PHOSPHATE SERPL-MCNC: 2 MG/DL — LOW (ref 2.5–4.5)
PLATELET # BLD AUTO: 198 K/UL — SIGNIFICANT CHANGE UP (ref 150–400)
POTASSIUM SERPL-MCNC: 4.1 MMOL/L — SIGNIFICANT CHANGE UP (ref 3.5–5.3)
POTASSIUM SERPL-SCNC: 4.1 MMOL/L — SIGNIFICANT CHANGE UP (ref 3.5–5.3)
RBC # BLD: 5.02 M/UL — SIGNIFICANT CHANGE UP (ref 4.2–5.8)
RBC # FLD: 12.9 % — SIGNIFICANT CHANGE UP (ref 10.3–14.5)
SODIUM SERPL-SCNC: 139 MMOL/L — SIGNIFICANT CHANGE UP (ref 135–145)
WBC # BLD: 10.33 K/UL — SIGNIFICANT CHANGE UP (ref 3.8–10.5)
WBC # FLD AUTO: 10.33 K/UL — SIGNIFICANT CHANGE UP (ref 3.8–10.5)

## 2022-06-04 RX ORDER — POTASSIUM PHOSPHATE, MONOBASIC POTASSIUM PHOSPHATE, DIBASIC 236; 224 MG/ML; MG/ML
30 INJECTION, SOLUTION INTRAVENOUS ONCE
Refills: 0 | Status: DISCONTINUED | OUTPATIENT
Start: 2022-06-04 | End: 2022-06-04

## 2022-06-04 RX ORDER — SODIUM,POTASSIUM PHOSPHATES 278-250MG
1 POWDER IN PACKET (EA) ORAL ONCE
Refills: 0 | Status: COMPLETED | OUTPATIENT
Start: 2022-06-04 | End: 2022-06-04

## 2022-06-04 RX ADMIN — Medication 1 TABLET(S): at 11:51

## 2022-06-04 RX ADMIN — Medication 100 MILLIGRAM(S): at 05:40

## 2022-06-04 NOTE — DISCHARGE NOTE PROVIDER - NSDCCPCAREPLAN_GEN_ALL_CORE_FT
PRINCIPAL DISCHARGE DIAGNOSIS  Diagnosis: Ventral incisional hernia  Assessment and Plan of Treatment:

## 2022-06-04 NOTE — DISCHARGE NOTE PROVIDER - NSDCMRMEDTOKEN_GEN_ALL_CORE_FT
enoxaparin 40 mg/0.4 mL injectable solution: 40 milligram(s) subcutaneously once a day MDD:40mg as directed by anticoagulation services

## 2022-06-04 NOTE — DISCHARGE NOTE NURSING/CASE MANAGEMENT/SOCIAL WORK - PATIENT PORTAL LINK FT
You can access the FollowMyHealth Patient Portal offered by [facility] by registering at the following website: [email or web address]. By joining [facility]’s FollowMyHealth portal, you will also be able to view your health information using other applications (apps) compatible with our system.

## 2022-06-04 NOTE — PROGRESS NOTE ADULT - ASSESSMENT
Doing well s/p incisional hernia repair retromuscular mesh    Plan  D/C on Lovenox  cont drain  regular diet  pain meds-- Motrin and Tylenol    F/U office Tuesday    Pt will record drain output

## 2022-06-04 NOTE — DISCHARGE NOTE PROVIDER - CARE PROVIDER_API CALL
Markus Alejandre)  Surgery; Surgical Critical Care  [address], [address]  [place] [address]  Phone: [phone]  Fax: [phone]  Established Patient  Follow Up Time: 1 week

## 2022-06-04 NOTE — DISCHARGE NOTE NURSING/CASE MANAGEMENT/SOCIAL WORK - NSDCPEFALRISK_GEN_ALL_CORE
For information on Fall & Injury Prevention, visit: [email or web address] OR  [email or web address] OR  https://www.cdc.gov/steadi/patient.html

## 2022-06-04 NOTE — DISCHARGE NOTE PROVIDER - HOSPITAL COURSE
A 52 year old male with ventral incisional hernia. He was admitted for elective repair of the hernia. He underwent laparoscopic ventral hernia repair with mesh. This was uneventful and he was observed in the hospital for 2 days. He was ambulating, started on diet and advanced gradually and was having bowel movements.  His pain was controlled and he was able to ambulate with minimal pain.  He will be sent home on pain medications, with the abdominal binder in place and the drain will be kept in place until his office visit.  He will need 10 days of Lovenox for anticoagulation

## 2022-06-04 NOTE — PROGRESS NOTE ADULT - SUBJECTIVE AND OBJECTIVE BOX
Pt was seen and examined at bedside this morning with surgery team. No acute overnight events reported by nursing staff. Pt offers no new complaints at this time. Denies fever/cp/sob/n/v/d/c. Vitals stable. +gas/-bm    T(C): 37 (06-02-22 @ 21:52), Max: 37 (06-02-22 @ 21:52)  HR: 81 (06-02-22 @ 21:52) (56 - 81)  BP: 101/55 (06-02-22 @ 21:52) (98/53 - 115/65)  RR: 18 (06-02-22 @ 21:52) (10 - 18)  SpO2: 96% (06-02-22 @ 21:52) (95% - 100%)    PHYSICAL EXAM:  Constitutional: NAD, GCS: 15/15  AOX3  Eyes:  WNL  ENMT:  WNL  Neck:  WNL, non tender  Back: Non tender  Respiratory: CTABL  Cardiovascular:  S1+S2+0  Gastrointestinal: Soft, nt/nd, no rgr  Surgical site: Dressing c/d/i, no strikethrough  LUIS M drain: secure and in place, serosangous   Genitourinary:  WNL  Extremities: NV intact  Vascular:  Intact  Neurological: No focal neurological deficit,  CN, motor and sensory system grossly intact.  Skin: WNL  Musculoskeletal: WNL  Psychiatric: Grossly WNL                          15.1   15.71 )-----------( 223      ( 03 Jun 2022 06:03 )             44.7     06-03    139  |  106  |  15  ----------------------------<  115<H>  4.4   |  25  |  1.19    Ca    8.6      03 Jun 2022 06:03  Phos  3.0     06-03  Mg     2.4     06-03              
  ALEM TRAN  [id number]  Male    Patient is a 52y old  Male who presents with a chief complaint of Ventral incisional hernia (04 Jun 2022 08:15)         Daily   Pt feeling well, mild right sided abd wall discomfort  Passing flatus and voiding w/o issues    Physical Exam:  Pt is AAOx3  Pt in no acute distress  HEENT: Normocephalic, atraumatic, MAHAMED, EOM wnl, sclera non icteric  Neck: No crepitus, no ecchymosis, no hematoma, to exam, no JVD, no tracheal deviation  Cardiovascular: S1S2 Present  Respiratory: Respiratory Effort normal; Lungs clear, no wheezes, rales or rhonchi   ABD: Bowel sounds (+), soft, nontender generally, mild tenderness at suture fixation sites, non distended, no rebound, no guarding.   Doyle drain serosang  Musculoskeletal: Pulses normal, all digits are warm and well perfused. No clubbing, cyanosis or edema.      I&O's Summary    03 Jun 2022 07:01  -  04 Jun 2022 07:00  --------------------------------------------------------  IN: 600 mL / OUT: 1230 mL / NET: -630 mL    04 Jun 2022 07:01  -  04 Jun 2022 09:12  --------------------------------------------------------  IN: 0 mL / OUT: 200 mL / NET: -200 mL          CBC Full  -  ( 04 Jun 2022 06:35 )  WBC Count : 10.33 K/uL  RBC Count : 5.02 M/uL  Hemoglobin : 15.0 g/dL  Hematocrit : 45.4 %  Platelet Count - Automated : 198 K/uL  Mean Cell Volume : 90.4 fl  Mean Cell Hemoglobin : 29.9 pg  Mean Cell Hemoglobin Concentration : 33.0 gm/dL  Auto Neutrophil # : x  Auto Lymphocyte # : x  Auto Monocyte # : x  Auto Eosinophil # : x  Auto Basophil # : x  Auto Neutrophil % : x  Auto Lymphocyte % : x  Auto Monocyte % : x  Auto Eosinophil % : x  Auto Basophil % : x  06-04    139  |  107  |  15  ----------------------------<  91  4.1   |  28  |  1.22    Ca    8.8      04 Jun 2022 06:35  Phos  2.0     06-04  Mg     2.4     06-04          HEALTH ISSUES - PROBLEM Dx:

## 2022-06-06 ENCOUNTER — NON-APPOINTMENT (OUTPATIENT)
Age: 52
End: 2022-06-06

## 2022-06-13 DIAGNOSIS — K43.0 INCISIONAL HERNIA WITH OBSTRUCTION, WITHOUT GANGRENE: ICD-10-CM

## 2022-06-13 DIAGNOSIS — K42.9 UMBILICAL HERNIA WITHOUT OBSTRUCTION OR GANGRENE: ICD-10-CM

## 2022-06-13 DIAGNOSIS — K76.0 FATTY (CHANGE OF) LIVER, NOT ELSEWHERE CLASSIFIED: ICD-10-CM

## 2023-01-03 PROBLEM — K43.9 VENTRAL HERNIA WITHOUT OBSTRUCTION OR GANGRENE: Chronic | Status: ACTIVE | Noted: 2022-05-27

## 2023-01-03 PROBLEM — K57.92 DIVERTICULITIS OF INTESTINE, PART UNSPECIFIED, WITHOUT PERFORATION OR ABSCESS WITHOUT BLEEDING: Chronic | Status: ACTIVE | Noted: 2022-05-27

## 2023-01-03 PROBLEM — Z93.3 COLOSTOMY STATUS: Chronic | Status: ACTIVE | Noted: 2017-10-19

## 2023-01-03 PROBLEM — U07.1 COVID-19: Chronic | Status: ACTIVE | Noted: 2022-05-27

## 2023-01-05 ENCOUNTER — APPOINTMENT (OUTPATIENT)
Dept: FAMILY MEDICINE | Facility: CLINIC | Age: 53
End: 2023-01-05
Payer: COMMERCIAL

## 2023-01-05 VITALS
SYSTOLIC BLOOD PRESSURE: 110 MMHG | DIASTOLIC BLOOD PRESSURE: 84 MMHG | OXYGEN SATURATION: 97 % | HEART RATE: 63 BPM | TEMPERATURE: 98 F | RESPIRATION RATE: 17 BRPM

## 2023-01-05 DIAGNOSIS — Z11.59 ENCOUNTER FOR SCREENING FOR OTHER VIRAL DISEASES: ICD-10-CM

## 2023-01-05 DIAGNOSIS — N52.9 MALE ERECTILE DYSFUNCTION, UNSPECIFIED: ICD-10-CM

## 2023-01-05 DIAGNOSIS — K42.9 UMBILICAL HERNIA W/OUT OBSTRUCTION OR GANGRENE: ICD-10-CM

## 2023-01-05 DIAGNOSIS — R53.82 CHRONIC FATIGUE, UNSPECIFIED: ICD-10-CM

## 2023-01-05 DIAGNOSIS — F51.01 PRIMARY INSOMNIA: ICD-10-CM

## 2023-01-05 DIAGNOSIS — R21 RASH AND OTHER NONSPECIFIC SKIN ERUPTION: ICD-10-CM

## 2023-01-05 DIAGNOSIS — K43.9 VENTRAL HERNIA W/OUT OBSTRUCTION OR GANGRENE: ICD-10-CM

## 2023-01-05 DIAGNOSIS — Z01.818 ENCOUNTER FOR OTHER PREPROCEDURAL EXAMINATION: ICD-10-CM

## 2023-01-05 DIAGNOSIS — R10.814 LEFT LOWER QUADRANT ABDOMINAL TENDERNESS: ICD-10-CM

## 2023-01-05 DIAGNOSIS — K91.89 OTHER POSTPROCEDURAL COMPLICATIONS AND DISORDERS OF DIGESTIVE SYSTEM: ICD-10-CM

## 2023-01-05 DIAGNOSIS — Z23 ENCOUNTER FOR IMMUNIZATION: ICD-10-CM

## 2023-01-05 PROCEDURE — 99214 OFFICE O/P EST MOD 30 MIN: CPT | Mod: 25

## 2023-01-05 PROCEDURE — 36415 COLL VENOUS BLD VENIPUNCTURE: CPT

## 2023-01-05 RX ORDER — ZOLPIDEM TARTRATE 12.5 MG/1
12.5 TABLET, EXTENDED RELEASE ORAL
Qty: 30 | Refills: 1 | Status: ACTIVE | COMMUNITY
Start: 2023-01-05 | End: 1900-01-01

## 2023-01-08 LAB
BASOPHILS # BLD AUTO: 0.07 K/UL
BASOPHILS NFR BLD AUTO: 1 %
EOSINOPHIL # BLD AUTO: 0.13 K/UL
EOSINOPHIL NFR BLD AUTO: 1.9 %
HCT VFR BLD CALC: 49.7 %
HGB BLD-MCNC: 16.5 G/DL
IMM GRANULOCYTES NFR BLD AUTO: 0.1 %
LYMPHOCYTES # BLD AUTO: 2.35 K/UL
LYMPHOCYTES NFR BLD AUTO: 35 %
MAN DIFF?: NORMAL
MCHC RBC-ENTMCNC: 30.2 PG
MCHC RBC-ENTMCNC: 33.2 GM/DL
MCV RBC AUTO: 90.9 FL
MONOCYTES # BLD AUTO: 0.62 K/UL
MONOCYTES NFR BLD AUTO: 9.2 %
NEUTROPHILS # BLD AUTO: 3.54 K/UL
NEUTROPHILS NFR BLD AUTO: 52.8 %
PLATELET # BLD AUTO: 207 K/UL
RBC # BLD: 5.47 M/UL
RBC # FLD: 12.9 %
T3FREE SERPL-MCNC: 3.26 PG/ML
T4 FREE SERPL-MCNC: 1.4 NG/DL
TESTOST SERPL-MCNC: 579 NG/DL
TSH SERPL-ACNC: 0.67 UIU/ML
WBC # FLD AUTO: 6.72 K/UL

## 2023-01-11 NOTE — DISCHARGE NOTE ADULT - THE PATIENT HAS
Problem: METABOLIC, FLUID AND ELECTROLYTES - ADULT  Goal: Glucose maintained within target range  Description: INTERVENTIONS:  - Monitor Blood Glucose as ordered  - Assess for signs and symptoms of hyperglycemia and hypoglycemia  - Administer ordered medications to maintain glucose within target range  - Assess nutritional intake and initiate nutrition service referral as needed  Outcome: Progressing     Problem: RESPIRATORY - ADULT  Goal: Achieves optimal ventilation and oxygenation  Description: INTERVENTIONS:  - Assess for changes in respiratory status  - Assess for changes in mentation and behavior  - Position to facilitate oxygenation and minimize respiratory effort  - Oxygen administered by appropriate delivery if ordered  - Initiate smoking cessation education as indicated  - Encourage broncho-pulmonary hygiene including cough, deep breathe, Incentive Spirometry  - Assess the need for suctioning and aspirate as needed  - Assess and instruct to report SOB or any respiratory difficulty  - Respiratory Therapy support as indicated  Outcome: Progressing no difficulties

## 2023-01-17 LAB
ALBUMIN SERPL ELPH-MCNC: 4.4 G/DL
ALP BLD-CCNC: 62 U/L
ALT SERPL-CCNC: 100 U/L
ANION GAP SERPL CALC-SCNC: 10 MMOL/L
AST SERPL-CCNC: 55 U/L
BILIRUB SERPL-MCNC: 0.8 MG/DL
BUN SERPL-MCNC: 13 MG/DL
CALCIUM SERPL-MCNC: 9.3 MG/DL
CHLORIDE SERPL-SCNC: 104 MMOL/L
CO2 SERPL-SCNC: 26 MMOL/L
CREAT SERPL-MCNC: 1.15 MG/DL
EGFR: 77 ML/MIN/1.73M2
GLUCOSE SERPL-MCNC: 91 MG/DL
POTASSIUM SERPL-SCNC: 4.4 MMOL/L
PROT SERPL-MCNC: 7 G/DL
SODIUM SERPL-SCNC: 140 MMOL/L

## 2023-01-20 DIAGNOSIS — N52.9 MALE ERECTILE DYSFUNCTION, UNSPECIFIED: ICD-10-CM

## 2023-01-20 RX ORDER — SILDENAFIL 50 MG/1
50 TABLET ORAL
Qty: 6 | Refills: 3 | Status: ACTIVE | COMMUNITY
Start: 2023-01-20 | End: 1900-01-01

## 2023-02-16 ENCOUNTER — APPOINTMENT (OUTPATIENT)
Dept: FAMILY MEDICINE | Facility: CLINIC | Age: 53
End: 2023-02-16

## 2023-05-17 NOTE — DISCHARGE NOTE ADULT - MODE OF TRANSPORTATION
Propranolol Counseling:  I discussed with the patient the risks of propranolol including but not limited to low heart rate, low blood pressure, low blood sugar, restlessness and increased cold sensitivity. They should call the office if they experience any of these side effects. Wheelchair/Stroller

## 2025-06-25 NOTE — ASU PATIENT PROFILE, ADULT - ALCOHOL USE HISTORY SINGLE SELECT
Medication passed protocol.     Medication: cyclobenzaprine (FLEXERIL) 10 MG tablet  passed protocol.       Last prescribed  12/16/24  Last visit: 6/12/2025    Next appt: Visit date not found    
yes...